# Patient Record
Sex: FEMALE | Race: BLACK OR AFRICAN AMERICAN | NOT HISPANIC OR LATINO | Employment: FULL TIME | URBAN - METROPOLITAN AREA
[De-identification: names, ages, dates, MRNs, and addresses within clinical notes are randomized per-mention and may not be internally consistent; named-entity substitution may affect disease eponyms.]

---

## 2017-01-05 DIAGNOSIS — M81.0 OSTEOPOROSIS, POSTMENOPAUSAL: ICD-10-CM

## 2017-01-06 RX ORDER — ALENDRONATE SODIUM 70 MG/1
TABLET ORAL
Qty: 4 TABLET | Refills: 0 | Status: SHIPPED | OUTPATIENT
Start: 2017-01-06 | End: 2017-01-16 | Stop reason: SINTOL

## 2017-01-09 ENCOUNTER — TELEPHONE (OUTPATIENT)
Dept: FAMILY MEDICINE | Facility: CLINIC | Age: 62
End: 2017-01-09

## 2017-01-09 NOTE — TELEPHONE ENCOUNTER
----- Message from Lizzy Kay sent at 12/30/2016 10:24 AM CST -----  Contact: Self   Patient would like to know if she need blood work before her appointment. Please call thank you

## 2017-01-10 ENCOUNTER — TELEPHONE (OUTPATIENT)
Dept: FAMILY MEDICINE | Facility: CLINIC | Age: 62
End: 2017-01-10

## 2017-01-10 DIAGNOSIS — E78.5 HYPERLIPIDEMIA, UNSPECIFIED HYPERLIPIDEMIA TYPE: ICD-10-CM

## 2017-01-10 DIAGNOSIS — I10 ESSENTIAL HYPERTENSION: Primary | ICD-10-CM

## 2017-01-10 NOTE — TELEPHONE ENCOUNTER
----- Message from Bailey Cunningham sent at 1/10/2017 12:34 PM CST -----  Contact: Self  Pt returned call. Pt also states she would like to have labs done @ [facility] [address]. Pt can be reached @ [phone].

## 2017-01-13 LAB
ALBUMIN SERPL-MCNC: 4.7 G/DL (ref 3.6–5.1)
ALBUMIN/GLOB SERPL: 1.7 (CALC) (ref 1–2.5)
ALP SERPL-CCNC: 43 U/L (ref 33–130)
ALT SERPL-CCNC: 24 U/L (ref 6–29)
AST SERPL-CCNC: 28 U/L (ref 10–35)
BASOPHILS # BLD AUTO: 20 CELLS/UL (ref 0–200)
BASOPHILS NFR BLD AUTO: 0.3 %
BILIRUB SERPL-MCNC: 0.5 MG/DL (ref 0.2–1.2)
BUN SERPL-MCNC: 14 MG/DL (ref 7–25)
BUN/CREAT SERPL: NORMAL (CALC) (ref 6–22)
CALCIUM SERPL-MCNC: 10.1 MG/DL (ref 8.6–10.4)
CHLORIDE SERPL-SCNC: 99 MMOL/L (ref 98–110)
CHOLEST SERPL-MCNC: 234 MG/DL (ref 125–200)
CHOLEST/HDLC SERPL: 2.1 (CALC)
CO2 SERPL-SCNC: 31 MMOL/L (ref 20–31)
CREAT SERPL-MCNC: 0.68 MG/DL (ref 0.5–0.99)
EOSINOPHIL # BLD AUTO: 33 CELLS/UL (ref 15–500)
EOSINOPHIL NFR BLD AUTO: 0.5 %
ERYTHROCYTE [DISTWIDTH] IN BLOOD BY AUTOMATED COUNT: 14 % (ref 11–15)
GFR SERPL CREATININE-BSD FRML MDRD: 93 ML/MIN/1.73M2
GLOBULIN SER CALC-MCNC: 2.7 G/DL (CALC) (ref 1.9–3.7)
GLUCOSE SERPL-MCNC: 90 MG/DL (ref 65–99)
HCT VFR BLD AUTO: 43.3 % (ref 35–45)
HDLC SERPL-MCNC: 114 MG/DL
HGB BLD-MCNC: 13.2 G/DL (ref 11.7–15.5)
LDLC SERPL CALC-MCNC: 109 MG/DL (CALC)
LYMPHOCYTES # BLD AUTO: 2613 CELLS/UL (ref 850–3900)
LYMPHOCYTES NFR BLD AUTO: 40.2 %
MCH RBC QN AUTO: 25.6 PG (ref 27–33)
MCHC RBC AUTO-ENTMCNC: 30.5 G/DL (ref 32–36)
MCV RBC AUTO: 84.1 FL (ref 80–100)
MONOCYTES # BLD AUTO: 585 CELLS/UL (ref 200–950)
MONOCYTES NFR BLD AUTO: 9 %
NEUTROPHILS # BLD AUTO: 3250 CELLS/UL (ref 1500–7800)
NEUTROPHILS NFR BLD AUTO: 50 %
NONHDLC SERPL-MCNC: 120 MG/DL (CALC)
PLATELET # BLD AUTO: 222 THOUSAND/UL (ref 140–400)
PMV BLD REES-ECKER: 9.8 FL (ref 7.5–11.5)
POTASSIUM SERPL-SCNC: 5.2 MMOL/L (ref 3.5–5.3)
PROT SERPL-MCNC: 7.4 G/DL (ref 6.1–8.1)
RBC # BLD AUTO: 5.15 MILLION/UL (ref 3.8–5.1)
SODIUM SERPL-SCNC: 139 MMOL/L (ref 135–146)
TRIGL SERPL-MCNC: 55 MG/DL
WBC # BLD AUTO: 6.5 THOUSAND/UL (ref 3.8–10.8)

## 2017-01-16 ENCOUNTER — OFFICE VISIT (OUTPATIENT)
Dept: FAMILY MEDICINE | Facility: CLINIC | Age: 62
End: 2017-01-16
Payer: COMMERCIAL

## 2017-01-16 ENCOUNTER — OFFICE VISIT (OUTPATIENT)
Dept: OBSTETRICS AND GYNECOLOGY | Facility: CLINIC | Age: 62
End: 2017-01-16
Attending: OBSTETRICS & GYNECOLOGY
Payer: COMMERCIAL

## 2017-01-16 VITALS
DIASTOLIC BLOOD PRESSURE: 76 MMHG | SYSTOLIC BLOOD PRESSURE: 120 MMHG | HEIGHT: 63 IN | BODY MASS INDEX: 17.03 KG/M2 | WEIGHT: 96.13 LBS

## 2017-01-16 VITALS
SYSTOLIC BLOOD PRESSURE: 138 MMHG | DIASTOLIC BLOOD PRESSURE: 84 MMHG | TEMPERATURE: 98 F | BODY MASS INDEX: 17.05 KG/M2 | HEART RATE: 72 BPM | HEIGHT: 63 IN | WEIGHT: 96.25 LBS

## 2017-01-16 DIAGNOSIS — Z01.419 WELL WOMAN EXAM WITH ROUTINE GYNECOLOGICAL EXAM: Primary | ICD-10-CM

## 2017-01-16 DIAGNOSIS — J06.9 VIRAL URI WITH COUGH: ICD-10-CM

## 2017-01-16 DIAGNOSIS — Z12.4 PAP SMEAR FOR CERVICAL CANCER SCREENING: ICD-10-CM

## 2017-01-16 DIAGNOSIS — J30.1 SEASONAL ALLERGIC RHINITIS DUE TO POLLEN: ICD-10-CM

## 2017-01-16 DIAGNOSIS — E55.9 VITAMIN D DEFICIENCY: ICD-10-CM

## 2017-01-16 DIAGNOSIS — M81.0 OSTEOPOROSIS, POSTMENOPAUSAL: ICD-10-CM

## 2017-01-16 DIAGNOSIS — Z12.31 VISIT FOR SCREENING MAMMOGRAM: ICD-10-CM

## 2017-01-16 DIAGNOSIS — I10 ESSENTIAL HYPERTENSION: Primary | ICD-10-CM

## 2017-01-16 DIAGNOSIS — M81.0 OSTEOPOROSIS: ICD-10-CM

## 2017-01-16 DIAGNOSIS — Z78.0 POSTMENOPAUSAL STATUS: ICD-10-CM

## 2017-01-16 PROCEDURE — 99999 PR PBB SHADOW E&M-EST. PATIENT-LVL III: CPT | Mod: PBBFAC,,, | Performed by: NURSE PRACTITIONER

## 2017-01-16 PROCEDURE — 99214 OFFICE O/P EST MOD 30 MIN: CPT | Mod: S$GLB,,, | Performed by: NURSE PRACTITIONER

## 2017-01-16 PROCEDURE — 3075F SYST BP GE 130 - 139MM HG: CPT | Mod: S$GLB,,, | Performed by: NURSE PRACTITIONER

## 2017-01-16 PROCEDURE — 99396 PREV VISIT EST AGE 40-64: CPT | Mod: S$GLB,,, | Performed by: OBSTETRICS & GYNECOLOGY

## 2017-01-16 PROCEDURE — 3079F DIAST BP 80-89 MM HG: CPT | Mod: S$GLB,,, | Performed by: NURSE PRACTITIONER

## 2017-01-16 PROCEDURE — 99999 PR PBB SHADOW E&M-EST. PATIENT-LVL III: CPT | Mod: PBBFAC,,, | Performed by: OBSTETRICS & GYNECOLOGY

## 2017-01-16 PROCEDURE — 1159F MED LIST DOCD IN RCRD: CPT | Mod: S$GLB,,, | Performed by: NURSE PRACTITIONER

## 2017-01-16 PROCEDURE — 3074F SYST BP LT 130 MM HG: CPT | Mod: S$GLB,,, | Performed by: OBSTETRICS & GYNECOLOGY

## 2017-01-16 PROCEDURE — 3078F DIAST BP <80 MM HG: CPT | Mod: S$GLB,,, | Performed by: OBSTETRICS & GYNECOLOGY

## 2017-01-16 RX ORDER — OLOPATADINE HYDROCHLORIDE 1 MG/ML
1 SOLUTION/ DROPS OPHTHALMIC 2 TIMES DAILY
COMMUNITY
End: 2017-04-10

## 2017-01-16 NOTE — PROGRESS NOTES
Cathy Reynolds is a 61 y.o. year old  female who presents for routine GYN exam.  She is postmenopausal, not on HRT.  No bleeding.  No hot flashes.  Denies changes in her medical / surgical history over the past year.  She is currently on Fosamax for osteoporosis but reports the development of GI upset / reflux- she plans to discuss with her PCP later today.  No GYN complaints.    BMD 3/3/16: Spine T-3.4,  Hip T-2.0, T-2.1      Past Medical History   Diagnosis Date    AR (allergic rhinitis)     Diverticulosis     Elevated LFTs      borderline - due to OTC herbals - resolved    History of colon polyps     Hyperlipidemia      borderline with high HDL    Hypertension     Mild anxiety     Osteoporosis, postmenopausal     Postmenopausal status     Underweight     Vitamin D deficiency        Past Surgical History   Procedure Laterality Date    Polyp removal from cervix       section, low transverse      Myomectomy      Colonoscopy         OB History      Para Term  AB TAB SAB Ectopic Multiple Living    2 2        2            ROS:  GENERAL: Feeling well overall.   SKIN: Denies rash or lesions.   HEAD: Denies head injury or headache.   NODES: Denies enlarged lymph nodes.   CHEST: Denies chest pain or shortness of breath.   CARDIOVASCULAR: Denies palpitations or left sided chest pain.   ABDOMEN: Reports GI upset / reflux.   URINARY: No dysuria or hematuria.  REPRODUCTIVE: See HPI.   BREASTS: Denies pain, lumps, or nipple discharge.   HEMATOLOGIC: No easy bruisability or excessive bleeding.   MUSCULOSKELETAL: Denies joint pain.  NEUROLOGIC: Denies syncope or weakness.   PSYCHIATRIC: Denies depression.     PE:   (chaperone present during entire exam)  APPEARANCE: Well nourished, well developed, in no acute distress.  BREASTS: Symmetrical, no skin changes or visible lesions. No palpable masses, nipple discharge or adenopathy bilaterally.  ABDOMEN: Soft. No tenderness or masses. No  hernias. No CVA tenderness.  VULVA: Atrophic. No lesions. Normal female genitalia.  URETHRAL MEATUS: Normal size and location, no lesions, no prolapse.  URETHRA: No masses, tenderness, prolapse or scarring.  VAGINA: Atrophic.  No lesions, no discharge, no significant cystocele or rectocele.  CERVIX: No lesions and discharge. PAP done.  UTERUS: Normal size, regular shape, mobile, non-tender, bladder base nontender.  ADNEXA: No masses, tenderness or CDS nodularity.  ANUS PERINEUM: Normal.    Diagnosis:  1. Well woman exam with routine gynecological exam    2. Postmenopausal status    3. Pap smear for cervical cancer screening    4. Visit for screening mammogram          PLAN:    Orders Placed This Encounter    Mammo Digital Screening Bilat with Tomos    Mammo Digital Screening Bilat With CAD    Liquid-based pap smear, screening       Patient was counseled today on postmenopausal issues.  We reviewed her osteoporosis and usage of Fosamax - she will discuss further with Dr. Carrizales later today.    Follow-up in 1 year.

## 2017-01-16 NOTE — PATIENT INSTRUCTIONS
Mucinex DM as directed  Call and schedule a appointment with Dr. Rockwell to discuss osteoporosis treatment

## 2017-01-16 NOTE — MR AVS SNAPSHOT
[facility]  [address]  [place] [address]  Phone: [phone]  Fax: [phone]                  Cathy Reynolds   2017 1:20 PM   Office Visit    Description:  Female : 1955   Provider:  MYA Rubin   Department:  [facility]           Reason for Visit     Hypertension           Diagnoses this Visit        Comments    Essential hypertension    -  Primary     Osteoporosis, postmenopausal         Vitamin D deficiency         Seasonal allergic rhinitis due to pollen         Viral URI with cough                To Do List           Future Appointments        Provider Department Dept Phone    2017 8:30 AM [facility] [phone]      Goals (5 Years of Data)     None      [facility]     [facility] Nurse Care [facility] -  Assistance  Registered nurses in the [facility] provide clinical advisement, health education, appointment booking, and other advisory services.  Call for this free service at [phone].             Medications           Message regarding Medications     Verify the changes and/or additions to your medication regime listed below are the same as discussed with your clinician today.  If any of these changes or additions are incorrect, please notify your healthcare provider.        STOP taking these medications     alendronate (FOSAMAX) 70 MG tablet TAKE 1 TABLET BY MOUTH EVERY 7 DAYS.           Verify that the below list of medications is an accurate representation of the medications you are currently taking.  If none reported, the list may be blank. If incorrect, please contact your healthcare provider. Carry this list with you in case of emergency.           Current Medications     olopatadine (PATANOL) 0.1 % ophthalmic solution 1 drop 2 (two) times daily.    fluticasone (FLONASE) 50 mcg/actuation nasal spray 2 sprays by Each Nare route once daily.    loratadine  "(CLARITIN) 10 mg tablet Take 1 tablet (10 mg total) by mouth once daily.    losartan (COZAAR) 100 MG tablet TAKE 1 TABLET BY MOUTH ONCE DAILY.    methocarbamol (ROBAXIN) 500 MG Tab Take 1 tablet (500 mg total) by mouth 4 (four) times daily. As needed for muscle spasms    SIMETHICONE (GAS-X ORAL) Take 1 capsule by mouth once as needed.           Clinical Reference Information           Vital Signs - Last Recorded  Most recent update: 1/16/2017  1:38 PM by Pippa Franklin MA    Pulse Temp Ht Wt BMI    72 98 °F (36.7 °C) (Oral) 5' 3" (1.6 m) 43.6 kg (96 lb 3.7 oz) 17.05 kg/m2      Allergies as of 1/16/2017     Ace Inhibitors    Penicillins      Immunizations Administered on Date of Encounter - 1/16/2017     None      Instructions    Mucinex DM as directed  Call and schedule a appointment with Dr. Rockwell to discuss osteoporosis treatment        "

## 2017-01-16 NOTE — PROGRESS NOTES
Subjective:       Patient ID: Cathy Reynolds is a 61 y.o. female.    Chief Complaint: Hypertension    HPI Comments: 61-year-old female presents to the clinic today for hypertension checkup.  She is also here to discuss her lab results.  I did discuss her lab results with her.  She states she saw GYN Dr. Dos Santos this morning they told her to follow-up with endocrinologist Dr. Rockwell to discuss osteoporosis treatment.  She says she thinks the Fosamax that she's taking is causing GI upset.  She also has some mild sinus congestion with a mild cough.  She denies any fever, chills, sore throat, ear pain, wheezing, shortness of breath, abdominal pain, nausea, vomiting, or diarrhea.  She denies any cardiac chest pain, heart palpitations, shortness breath, or swelling to lower extremities.  She denies any headaches, dizziness, or blurred vision.  She says she will call herself to schedule appointment with Dr. Rockwell. She says her home blood pressure run 107-120/74-80's.     Past Medical History   Diagnosis Date    AR (allergic rhinitis)     Diverticulosis     Elevated LFTs      borderline - due to OTC herbals - resolved    History of colon polyps     Hyperlipidemia      borderline with high HDL    Hypertension     Mild anxiety     Osteoporosis, postmenopausal     Postmenopausal status     Underweight     Vitamin D deficiency      Past Surgical History   Procedure Laterality Date    Polyp removal from cervix       section, low transverse      Myomectomy      Colonoscopy        reports that she has never smoked. She does not have any smokeless tobacco history on file. She reports that she does not drink alcohol or use illicit drugs.  Review of Systems   Constitutional: Negative for chills and fever.   HENT: Positive for congestion. Negative for ear discharge, ear pain, postnasal drip, rhinorrhea, sinus pressure, sneezing and sore throat.    Eyes: Negative for pain, discharge and itching.    Respiratory: Positive for cough. Negative for shortness of breath and wheezing.    Cardiovascular: Negative for chest pain, palpitations and leg swelling.   Gastrointestinal: Negative for abdominal pain, diarrhea, nausea and vomiting.   Musculoskeletal: Negative for gait problem.   Neurological: Negative for dizziness, light-headedness and headaches.       Objective:      Physical Exam   Constitutional: She is oriented to person, place, and time. She appears well-developed and well-nourished. No distress.   HENT:   Head: Normocephalic and atraumatic.   Right Ear: External ear normal.   Left Ear: External ear normal.   Nose: Nose normal.   Mouth/Throat: Oropharynx is clear and moist. No oropharyngeal exudate.   Eyes: Conjunctivae and EOM are normal. Pupils are equal, round, and reactive to light. Right eye exhibits no discharge. Left eye exhibits no discharge. No scleral icterus.   Neck: Normal range of motion. Neck supple. No JVD present.   Cardiovascular: Normal rate, regular rhythm and normal heart sounds.  Exam reveals no gallop and no friction rub.    No murmur heard.  Pulmonary/Chest: Effort normal and breath sounds normal. No respiratory distress. She has no wheezes. She has no rales.   Abdominal: Soft. Bowel sounds are normal. There is no tenderness.   Musculoskeletal: Normal range of motion. She exhibits no edema.   Lymphadenopathy:     She has no cervical adenopathy.   Neurological: She is alert and oriented to person, place, and time.   Skin: Skin is warm and dry. She is not diaphoretic.   Psychiatric: She has a normal mood and affect.       Assessment:       1. Essential hypertension    2. Osteoporosis, postmenopausal    3. Vitamin D deficiency    4. Seasonal allergic rhinitis due to pollen    5. Viral URI with cough        Plan:         Essential hypertension  - The current medical regimen is effective;  continue present plan and medications.    Osteoporosis, postmenopausal  - wants to stop Fosamax and  will call and schedule a apt with Dr. Rockwell to discuss shots for osteoporosis treatment     Vitamin D deficiency  - continue vitamin d    Seasonal allergic rhinitis due to pollen  - The current medical regimen is effective;  continue present plan and medications.    Viral URI with cough  - Mucinex DM as directed

## 2017-02-04 DIAGNOSIS — I10 ESSENTIAL HYPERTENSION: ICD-10-CM

## 2017-02-05 RX ORDER — LOSARTAN POTASSIUM 100 MG/1
TABLET ORAL
Qty: 30 TABLET | Refills: 1 | Status: SHIPPED | OUTPATIENT
Start: 2017-02-05 | End: 2017-04-10 | Stop reason: SINTOL

## 2017-02-06 RX ORDER — FLUTICASONE PROPIONATE 50 MCG
SPRAY, SUSPENSION (ML) NASAL
Qty: 16 G | Refills: 0 | Status: SHIPPED | OUTPATIENT
Start: 2017-02-06 | End: 2017-09-25 | Stop reason: SDUPTHER

## 2017-02-27 ENCOUNTER — HOSPITAL ENCOUNTER (OUTPATIENT)
Dept: RADIOLOGY | Facility: HOSPITAL | Age: 62
Discharge: HOME | End: 2017-02-27
Attending: OBSTETRICS & GYNECOLOGY
Payer: COMMERCIAL

## 2017-02-27 DIAGNOSIS — Z12.31 VISIT FOR SCREENING MAMMOGRAM: ICD-10-CM

## 2017-02-27 PROCEDURE — 77067 SCR MAMMO BI INCL CAD: CPT | Mod: TC

## 2017-02-27 PROCEDURE — 77067 SCR MAMMO BI INCL CAD: CPT | Mod: 26,,, | Performed by: RADIOLOGY

## 2017-03-14 ENCOUNTER — OFFICE VISIT (OUTPATIENT)
Dept: ENDOCRINOLOGY | Facility: CLINIC | Age: 62
End: 2017-03-14
Payer: COMMERCIAL

## 2017-03-14 ENCOUNTER — TELEPHONE (OUTPATIENT)
Dept: ENDOCRINOLOGY | Facility: CLINIC | Age: 62
End: 2017-03-14

## 2017-03-14 VITALS
SYSTOLIC BLOOD PRESSURE: 148 MMHG | HEART RATE: 83 BPM | BODY MASS INDEX: 17.62 KG/M2 | HEIGHT: 63 IN | WEIGHT: 99.44 LBS | DIASTOLIC BLOOD PRESSURE: 96 MMHG

## 2017-03-14 DIAGNOSIS — M81.0 OSTEOPOROSIS, POSTMENOPAUSAL: Primary | ICD-10-CM

## 2017-03-14 DIAGNOSIS — I10 ESSENTIAL HYPERTENSION: ICD-10-CM

## 2017-03-14 DIAGNOSIS — F41.9 MILD ANXIETY: ICD-10-CM

## 2017-03-14 DIAGNOSIS — R68.89 ABNORMAL ENDOCRINE LABORATORY TEST FINDING: Primary | ICD-10-CM

## 2017-03-14 DIAGNOSIS — R63.6 UNDERWEIGHT: ICD-10-CM

## 2017-03-14 PROCEDURE — 99244 OFF/OP CNSLTJ NEW/EST MOD 40: CPT | Mod: S$GLB,,, | Performed by: INTERNAL MEDICINE

## 2017-03-14 PROCEDURE — 99999 PR PBB SHADOW E&M-EST. PATIENT-LVL III: CPT | Mod: PBBFAC,,, | Performed by: INTERNAL MEDICINE

## 2017-03-14 NOTE — MR AVS SNAPSHOT
[facility]  [address]  [facility] [address]  Phone: [phone]  Fax: [phone]                  Cathy Reynolds   3/14/2017 10:00 AM   Office Visit    Description:  Female : 1955   Provider:  Honorio Rockwell MD   [facility]  [facility] - [facility]Metab           Reason for Visit     Osteoporosis           Diagnoses this Visit        Comments    Osteoporosis, postmenopausal    -  Primary     Essential hypertension         Underweight         Mild anxiety                To Do List           Future Appointments        Provider Department Dept Phone    3/14/2017 12:05 PM LAB, APPOINTMENT [place] [facility] [phone]      Goals (5 Years of Data)     None      Follow-Up and Disposition     Follow-up and Disposition History      [facility]     [facility] Nurse Care [facility] -  Assistance  Registered nurses in the [facility] provide clinical advisement, health education, appointment booking, and other advisory services.  Call for this free service at [phone].             Medications           Message regarding Medications     Verify the changes and/or additions to your medication regime listed below are the same as discussed with your clinician today.  If any of these changes or additions are incorrect, please notify your healthcare provider.        STOP taking these medications     methocarbamol (ROBAXIN) 500 MG Tab Take 1 tablet (500 mg total) by mouth 4 (four) times daily. As needed for muscle spasms           Verify that the below list of medications is an accurate representation of the medications you are currently taking.  If none reported, the list may be blank. If incorrect, please contact your healthcare provider. Carry this list with you in case of emergency.           Current Medications     loratadine (CLARITIN) 10 mg tablet Take 1 tablet (10 mg total) by mouth once daily.    losartan (COZAAR) 100 MG tablet  "TAKE 1 TABLET BY MOUTH ONCE DAILY.    olopatadine (PATANOL) 0.1 % ophthalmic solution 1 drop 2 (two) times daily.    SIMETHICONE (GAS-X ORAL) Take 1 capsule by mouth once as needed.    fluticasone (FLONASE) 50 mcg/actuation nasal spray SPRAY TWICE IN EACH NOSTRIL ONCE DAILY           Clinical Reference Information           Your Vitals Were     BP Pulse Height Weight BMI    148/96 83 5' 3" (1.6 m) 45.1 kg (99 lb 6.8 oz) 17.61 kg/m2      Blood Pressure          Most Recent Value    BP  (!)  148/96      Allergies as of 3/14/2017     Ace Inhibitors    Penicillins      Immunizations Administered on Date of Encounter - 3/14/2017     None      Orders Placed During Today's Visit     Future Labs/Procedures Expected by Expires    Protein electrophoresis, serum  3/14/2017 3/14/2018    PTH, intact  3/14/2017 3/14/2018    Renal function panel  3/14/2017 3/14/2018    TSH  3/14/2017 3/14/2018    Vitamin D  3/14/2017 3/14/2018      Instructions    Osteoporosis probably related to underweight and small bone structure and menopause    Lumbar spine BMD t-score -3.4, but increased 8% on alendronate  But alendronate caused heartburn    HTN and HLP controlled  BP today but normal at home  Repeat BP was 160/80    GERD    Rec: Consider Reclast, Prolia, raloxifene  Patient plans on not taking above and taking Algacal plus strontium  I explained the latter not approved in [place] but used in [place]  BMD repeat with PCP in 2018    My preference is Prolia       Language Assistance Services     ATTENTION: Language assistance services are available, free of charge. Please call [phone].      ATENCIÓN: Si habla español, tiene a uribe disposición servicios gratuitos de asistencia lingüística. Llame al [phone].     AMY Ý: N?u b?n nói Ti?ng Vi?t, có các d?ch v? h? tr? ngôn ng? mi?n phí dành cho b?n. G?i s? [phone].         [facility] complies with applicable Federal civil rights laws and does not discriminate on the " basis of race, color, national origin, age, disability, or sex.

## 2017-03-14 NOTE — LETTER
March 14, 2017      Masha Elizondo MD  [address]  [place] [address]           [facility] - Endo/Diab/Metab  [address]  [facility] [address]  Phone: [phone]  Fax: [phone]          Patient: Cathy Reynolds   MR Number: [id number]   YOB: 1955   Date of Visit: 3/14/2017       Dear Dr. Masha Elizondo:    Thank you for referring Cathy Reynolds to me for evaluation. Attached you will find relevant portions of my assessment and plan of care.    If you have questions, please do not hesitate to call me. I look forward to following Cathy Reynolds along with you.    Sincerely,    Honorio Rockwell MD    Enclosure  CC:  No Recipients    If you would like to receive this communication electronically, please contact [email or web address] or [phone] to request more information on [employer] access.    For providers and/or their staff who would like to refer a patient to [facility], please contact us through our one-stop-shop provider referral line, [facility], at [phone].    If you feel you have received this communication in error or would no longer like to receive these types of communications, please e-mail [email or web address]

## 2017-03-14 NOTE — PROGRESS NOTES
Subjective:      Patient ID: Cathy Reynolds is a 61 y.o. female.    Chief Complaint:  Osteoporosis      History of Present Illness  Consult is for osteoporosis  No hx of fractures  Menopause 49 y/o  2 children  No breast feeding  High school weight small    Underweight, Life long  Not losing weight    Never took hormones  Activity with children    HTN, HLP     Flonase PRN    Review of Systems   Constitutional: Negative for fatigue and unexpected weight change.   HENT: Negative for hearing loss.    Eyes: Negative for visual disturbance.   Respiratory: Negative for apnea, cough, chest tightness and shortness of breath.    Cardiovascular: Negative for chest pain, palpitations and leg swelling.   Gastrointestinal: Negative for abdominal pain, constipation, diarrhea, nausea and vomiting.   Genitourinary: Negative for dysuria, frequency, menstrual problem and vaginal discharge.   Musculoskeletal: Positive for back pain. Negative for arthralgias and gait problem.   Skin: Negative for rash.   Neurological: Positive for headaches. Negative for dizziness, tremors, weakness and numbness.   Psychiatric/Behavioral: Negative for sleep disturbance. The patient is nervous/anxious.        Objective:   Physical Exam   Constitutional: She is oriented to person, place, and time. She appears well-developed and well-nourished.   HENT:   Head: Normocephalic.   Eyes: EOM are normal. Pupils are equal, round, and reactive to light. No scleral icterus.   Neck: No thyromegaly present.   Cardiovascular: Normal rate, regular rhythm, normal heart sounds and intact distal pulses.  Exam reveals no gallop.    No murmur heard.  Pulmonary/Chest: Effort normal and breath sounds normal. No respiratory distress. She has no wheezes. She has no rales. She exhibits no tenderness.   Abdominal: Soft. Bowel sounds are normal. She exhibits no mass. There is no tenderness. There is no rebound.   Musculoskeletal: Normal range of motion. She exhibits no edema  or tenderness.   Scoliosis mild  Tandem gait normal  Very small bone structure   Lymphadenopathy:     She has no cervical adenopathy.   Neurological: She is alert and oriented to person, place, and time. She has normal reflexes. No cranial nerve deficit. Coordination normal.   Skin: Skin is warm and dry. No rash noted.   Psychiatric: She has a normal mood and affect. Her behavior is normal. Thought content normal.   Vitals reviewed.      Lab Review:   Results for orders placed or performed in visit on 01/10/17   CBC auto differential   Result Value Ref Range    WBC 6.5 3.8 - 10.8 Thousand/uL    RBC 5.15 (H) 3.80 - 5.10 Million/uL    Hemoglobin 13.2 11.7 - 15.5 g/dL    Hematocrit 43.3 35.0 - 45.0 %    MCV 84.1 80.0 - 100.0 fL    MCH 25.6 (L) 27.0 - 33.0 pg    MCHC 30.5 (L) 32.0 - 36.0 g/dL    RDW 14.0 11.0 - 15.0 %    Platelets 222 140 - 400 Thousand/uL    MPV 9.8 7.5 - 11.5 fL    Neutrophils Absolute 3250 1500 - 7800 cells/uL    Lymph # 2613 850 - 3900 cells/uL    Mono # 585 200 - 950 cells/uL    Eos # 33 15 - 500 cells/uL    Baso # 20 0 - 200 cells/uL    Neutrophils Relative 50.0 %    Lymph% 40.2 %    Mono% 9.0 %    Eosinophil% 0.5 %    Basophil% 0.3 %   Comprehensive metabolic panel   Result Value Ref Range    Glucose 90 65 - 99 mg/dL    BUN, Bld 14 7 - 25 mg/dL    Creatinine 0.68 0.50 - 0.99 mg/dL    eGFR if non  93 > OR = 60 mL/min/1.73m2    eGFR if African American 108 > OR = 60 mL/min/1.73m2    BUN/Creatinine Ratio NOT APPLICABLE 6 - 22 (calc)    Sodium 139 135 - 146 mmol/L    Potassium 5.2 3.5 - 5.3 mmol/L    Chloride 99 98 - 110 mmol/L    CO2 31 20 - 31 mmol/L    Calcium 10.1 8.6 - 10.4 mg/dL    Total Protein 7.4 6.1 - 8.1 g/dL    Albumin 4.7 3.6 - 5.1 g/dL    Globulin, Total 2.7 1.9 - 3.7 g/dL (calc)    Albumin/Globulin Ratio 1.7 1.0 - 2.5 (calc)    Total Bilirubin 0.5 0.2 - 1.2 mg/dL    Alkaline Phosphatase 43 33 - 130 U/L    AST 28 10 - 35 U/L    ALT 24 6 - 29 U/L   Lipid panel   Result  Value Ref Range    Cholesterol 234 (H) 125 - 200 mg/dL     > OR = 46 mg/dL    Triglycerides 55 <150 mg/dL    LDL Cholesterol 109 <130 mg/dL (calc)    HDL/Chol Ratio 2.1 < OR = 5.0 (calc)    Non HDL Chol. (LDL+VLDL) 120 mg/dL (calc)         Assessment:     Osteoporosis probably related to underweight and small bone structure and menopause    Lumbar spine BMD t-score -3.4, but increased 8% on alendronate  But alendronate caused heartburn    HTN and HLP controlled  BP today but normal at home  Repeat BP was 160/80    GERD    Rec: Consider Reclast, Prolia, raloxifene  Patient plans on not taking above and taking Algacal plus strontium  I explained the latter not approved in [place] but used in [place]  BMD repeat with PCP in 2018    My preference is Prolia      Plan:     Orders Placed This Encounter   Procedures    Vitamin D     Standing Status:   Future     Standing Expiration Date:   3/14/2018    PTH, intact     Standing Status:   Future     Standing Expiration Date:   3/14/2018    Renal function panel     Standing Status:   Future     Standing Expiration Date:   3/14/2018    Protein electrophoresis, serum     Standing Status:   Future     Standing Expiration Date:   3/14/2018

## 2017-03-14 NOTE — PATIENT INSTRUCTIONS
Osteoporosis probably related to underweight and small bone structure and menopause    Lumbar spine BMD t-score -3.4, but increased 8% on alendronate  But alendronate caused heartburn    HTN and HLP controlled  BP today but normal at home  Repeat BP was 160/80    GERD    Rec: Consider Reclast, Prolia, raloxifene  Patient plans on not taking above and taking Algacal plus strontium  I explained the latter not approved in [place] but used in [place]  BMD repeat with PCP in 2018    My preference is Prolia

## 2017-03-14 NOTE — TELEPHONE ENCOUNTER
The following lab tests are abnormal:  The TSH is a little low and can be seen with hyperthyroidism, but TSH normal  The potassium is mildly elevated and could be related to losartan  Contact your PCP about potassium and see about substituting losartan with another medication.  I will repeat TSH and renal function test in one week fasting.

## 2017-03-16 ENCOUNTER — TELEPHONE (OUTPATIENT)
Dept: ENDOCRINOLOGY | Facility: CLINIC | Age: 62
End: 2017-03-16

## 2017-03-16 NOTE — TELEPHONE ENCOUNTER
Left patient a message regarding to schedule her labs in one week.    - TSH  - Renal Function      Spoke to the patient on yesterday to schedule her labs, she wanted me to call today to schedule them.

## 2017-03-22 ENCOUNTER — TELEPHONE (OUTPATIENT)
Dept: ENDOCRINOLOGY | Facility: CLINIC | Age: 62
End: 2017-03-22

## 2017-03-22 ENCOUNTER — TELEPHONE (OUTPATIENT)
Dept: DIABETES | Facility: CLINIC | Age: 62
End: 2017-03-22

## 2017-03-22 DIAGNOSIS — R79.89 ABNORMAL THYROID BLOOD TEST: ICD-10-CM

## 2017-03-22 DIAGNOSIS — M81.0 SENILE OSTEOPOROSIS: Primary | ICD-10-CM

## 2017-03-22 DIAGNOSIS — R79.89 ABNORMAL THYROID BLOOD TEST: Primary | ICD-10-CM

## 2017-03-22 NOTE — TELEPHONE ENCOUNTER
----- Message from Alfa Ignacio sent at 3/22/2017 12:51 PM CDT -----  Contact: Patient  Patient stated that she will like to complete her labs at [facility] 7am if possible.    Please call patient at [phone].  Detailed message is okay

## 2017-03-28 ENCOUNTER — TELEPHONE (OUTPATIENT)
Dept: ENDOCRINOLOGY | Facility: CLINIC | Age: 62
End: 2017-03-28

## 2017-03-28 DIAGNOSIS — R68.89 ABNORMAL ENDOCRINE LABORATORY TEST FINDING: Primary | ICD-10-CM

## 2017-03-28 DIAGNOSIS — M81.0 SENILE OSTEOPOROSIS: ICD-10-CM

## 2017-03-28 NOTE — TELEPHONE ENCOUNTER
Left message that patient labs were put in for [facility] and if she has any other concerns or issues to give the office a call back

## 2017-03-28 NOTE — TELEPHONE ENCOUNTER
----- Message from Renea Soni sent at 3/24/2017  4:16 PM CDT -----  Contact: pt  Pt needs orders faxed to [facility]  [facility] [address]         Please call pt when faxed to [facility]     [facility] at 7:00 in morning at [facility]      [facility]   -  Ph   [phone]   If no answer leave message     Thanks

## 2017-03-28 NOTE — TELEPHONE ENCOUNTER
----- Message from Galilea Loaiza sent at 3/28/2017 12:38 PM CDT -----  Contact: pt    [phone]  nura Wolfe   Pt  Calling  To   To speak with the nurse in regards to getting labs set up  At [facility],

## 2017-04-03 LAB
1,25(OH)2D SERPL-MCNC: 52 PG/ML (ref 18–72)
1,25(OH)2D2 SERPL-MCNC: <8 PG/ML
1,25(OH)2D3 SERPL-MCNC: 52 PG/ML
ALBUMIN SERPL ELPH-MCNC: 4.6 G/DL (ref 3.8–4.8)
ALBUMIN SERPL-MCNC: 4.6 G/DL (ref 3.6–5.1)
ALPHA1 GLOB SERPL ELPH-MCNC: 0.3 G/DL (ref 0.2–0.3)
ALPHA2 GLOB SERPL ELPH-MCNC: 0.7 G/DL (ref 0.5–0.9)
BETA1 GLOB SERPL ELPH-MCNC: 0.5 G/DL (ref 0.4–0.6)
BETA2 GLOB SERPL ELPH-MCNC: 0.4 G/DL (ref 0.2–0.5)
BUN SERPL-MCNC: 16 MG/DL (ref 7–25)
BUN/CREAT SERPL: NORMAL (CALC) (ref 6–22)
CALCIUM SERPL-MCNC: 9.8 MG/DL (ref 8.6–10.4)
CHLORIDE SERPL-SCNC: 100 MMOL/L (ref 98–110)
CO2 SERPL-SCNC: 30 MMOL/L (ref 20–31)
CREAT SERPL-MCNC: 0.77 MG/DL (ref 0.5–0.99)
GAMMA GLOB SERPL ELPH-MCNC: 1.3 G/DL (ref 0.8–1.7)
GFR SERPL CREATININE-BSD FRML MDRD: 82 ML/MIN/1.73M2
GLUCOSE SERPL-MCNC: 98 MG/DL (ref 65–99)
PHOSPHATE SERPL-MCNC: 3.8 MG/DL (ref 2.5–4.5)
POTASSIUM SERPL-SCNC: 5.1 MMOL/L (ref 3.5–5.3)
PROT PATTERN SERPL ELPH-IMP: NORMAL
PROT SERPL-MCNC: 7.7 G/DL (ref 6.1–8.1)
PTH-INTACT SERPL-MCNC: 24 PG/ML (ref 14–64)
SODIUM SERPL-SCNC: 136 MMOL/L (ref 135–146)
TSH SERPL-ACNC: 1.29 MIU/L (ref 0.4–4.5)

## 2017-04-10 ENCOUNTER — TELEPHONE (OUTPATIENT)
Dept: ENDOCRINOLOGY | Facility: CLINIC | Age: 62
End: 2017-04-10

## 2017-04-10 ENCOUNTER — OFFICE VISIT (OUTPATIENT)
Dept: FAMILY MEDICINE | Facility: CLINIC | Age: 62
End: 2017-04-10
Payer: COMMERCIAL

## 2017-04-10 VITALS
OXYGEN SATURATION: 99 % | TEMPERATURE: 98 F | HEIGHT: 63 IN | HEART RATE: 92 BPM | SYSTOLIC BLOOD PRESSURE: 140 MMHG | BODY MASS INDEX: 17.32 KG/M2 | DIASTOLIC BLOOD PRESSURE: 90 MMHG | WEIGHT: 97.75 LBS

## 2017-04-10 DIAGNOSIS — M81.0 OSTEOPOROSIS, POSTMENOPAUSAL: ICD-10-CM

## 2017-04-10 DIAGNOSIS — I10 ESSENTIAL HYPERTENSION: Primary | ICD-10-CM

## 2017-04-10 DIAGNOSIS — J30.1 SEASONAL ALLERGIC RHINITIS DUE TO POLLEN: ICD-10-CM

## 2017-04-10 DIAGNOSIS — E55.9 VITAMIN D DEFICIENCY: ICD-10-CM

## 2017-04-10 PROCEDURE — 1160F RVW MEDS BY RX/DR IN RCRD: CPT | Mod: S$GLB,,, | Performed by: NURSE PRACTITIONER

## 2017-04-10 PROCEDURE — 3077F SYST BP >= 140 MM HG: CPT | Mod: S$GLB,,, | Performed by: NURSE PRACTITIONER

## 2017-04-10 PROCEDURE — 99214 OFFICE O/P EST MOD 30 MIN: CPT | Mod: S$GLB,,, | Performed by: NURSE PRACTITIONER

## 2017-04-10 PROCEDURE — 3080F DIAST BP >= 90 MM HG: CPT | Mod: S$GLB,,, | Performed by: NURSE PRACTITIONER

## 2017-04-10 PROCEDURE — 99999 PR PBB SHADOW E&M-EST. PATIENT-LVL IV: CPT | Mod: PBBFAC,,, | Performed by: NURSE PRACTITIONER

## 2017-04-10 RX ORDER — AMLODIPINE BESYLATE 5 MG/1
5 TABLET ORAL DAILY
Qty: 30 TABLET | Refills: 2 | Status: SHIPPED | OUTPATIENT
Start: 2017-04-10 | End: 2017-08-03 | Stop reason: SDUPTHER

## 2017-04-10 NOTE — PROGRESS NOTES
Subjective:       Patient ID: Cathy Reynolds is a 61 y.o. female.    Chief Complaint: Hypertension (Discuss Losartan)    HPI Comments: 61-year-old female presents to the clinic today for hypertension checkup.  She states her blood pressures at home having been running 105-130/75-85 .  She brought a letter from Dr. Rockwell stating that her potassium is elevated so she needed her blood pressure medication changed.  I am unable to view his labs at this time.  She denies any headaches, dizziness, or blurred vision.  She denies any cardiac chest pain, heart palpitations, shortness breath, or swelling to lower extremities. Dr. Rockwell recommended injections for her osteoporosis that she refuses them at this time.  She says she's thinking about what she wants today.     Past Medical History:   Diagnosis Date    AR (allergic rhinitis)     Diverticulosis     Elevated LFTs     borderline - due to OTC herbals - resolved    History of colon polyps     Hyperlipidemia     borderline with high HDL    Hypertension     Mild anxiety     Osteoporosis, postmenopausal     Postmenopausal status     Underweight     Vitamin D deficiency      Past Surgical History:   Procedure Laterality Date     SECTION, LOW TRANSVERSE      COLONOSCOPY      MYOMECTOMY      polyp removal from cervix        reports that she has never smoked. She does not have any smokeless tobacco history on file. She reports that she does not drink alcohol or use illicit drugs.  Review of Systems    Objective:      Physical Exam    Assessment:       1. Essential hypertension    2. Seasonal allergic rhinitis due to pollen    3. Osteoporosis, postmenopausal    4. Vitamin D deficiency        Plan:         Essential hypertension  - stop Losartan   - Start Amlodipine    Seasonal allergic rhinitis due to pollen  - The current medical regimen is effective;  continue present plan and medications.    Osteoporosis, postmenopausal  - followed by   Luli  - she is thinking about what she wants to take for her osteoporosis     Vitamin D deficiency  - continue Vitamin D 2,000 and Caltrate Bid      Other orders  -     amlodipine (NORVASC) 5 MG tablet; Take 1 tablet (5 mg total) by mouth once daily.  Dispense: 30 tablet; Refill: 2

## 2017-04-10 NOTE — MR AVS SNAPSHOT
[facility]  [address] [place]  [place] [address]  Phone: [phone]  Fax: [phone]                  Cathy Reynolds   4/10/2017 9:40 AM   Office Visit    Description:  Female : 1955   Provider:  MYA Rubin   Department:  [facility]           Reason for Visit     Hypertension           Diagnoses this Visit        Comments    Essential hypertension    -  Primary     Seasonal allergic rhinitis due to pollen         Osteoporosis, postmenopausal         Vitamin D deficiency                To Do List           Goals (5 Years of Data)     None       These Medications        Disp Refills Start End    amlodipine (NORVASC) 5 MG tablet 30 tablet 2 4/10/2017 4/10/2018    Take 1 tablet (5 mg total) by mouth once daily. - Oral    Pharmacy: [facility] - [address] AT [facility] #: [phone]         [facility] On Call     [facility] On Call Nurse Care Line -  Assistance  Unless otherwise directed by your provider, please contact [facility], our nurse care line that is available for  assistance.     Registered nurses in the [facility] On Call Center provide: appointment scheduling, clinical advisement, health education, and other advisory services.  Call: [phone] (toll free)               Medications           Message regarding Medications     Verify the changes and/or additions to your medication regime listed below are the same as discussed with your clinician today.  If any of these changes or additions are incorrect, please notify your healthcare provider.        START taking these NEW medications        Refills    amlodipine (NORVASC) 5 MG tablet 2    Sig: Take 1 tablet (5 mg total) by mouth once daily.    Class: Normal    Route: Oral      STOP taking these medications     olopatadine (PATANOL) 0.1 % ophthalmic solution 1 drop 2 (two) times daily.    losartan (COZAAR) 100  "MG tablet TAKE 1 TABLET BY MOUTH ONCE DAILY.           Verify that the below list of medications is an accurate representation of the medications you are currently taking.  If none reported, the list may be blank. If incorrect, please contact your healthcare provider. Carry this list with you in case of emergency.           Current Medications     fluticasone (FLONASE) 50 mcg/actuation nasal spray SPRAY TWICE IN EACH NOSTRIL ONCE DAILY    loratadine (CLARITIN) 10 mg tablet Take 1 tablet (10 mg total) by mouth once daily.    SIMETHICONE (GAS-X ORAL) Take 1 capsule by mouth once as needed.    amlodipine (NORVASC) 5 MG tablet Take 1 tablet (5 mg total) by mouth once daily.           Clinical Reference Information           Your Vitals Were     BP Pulse Temp Height Weight SpO2    140/90 92 98.2 °F (36.8 °C) (Oral) 5' 3" (1.6 m) 44.3 kg (97 lb 12.4 oz) 99%    BMI                17.32 kg/m2          Blood Pressure          Most Recent Value    BP  (!)  140/90      Allergies as of 4/10/2017     Ace Inhibitors    Penicillins      Immunizations Administered on Date of Encounter - 4/10/2017     None      Language Assistance Services     ATTENTION: Language assistance services are available, free of charge. Please call [phone].      ATENCIÓN: Si habla noeañol, tiene a uribe disposición servicios gratuitos de asistencia lingüística. Llame al [phone].     AMY Ý: N?u b?n nói Ti?ng Vi?t, có các d?ch v? h? tr? ngôn ng? mi?n phí dành cho b?n. G?i s? [phone].         [facility] complies with applicable Federal civil rights laws and does not discriminate on the basis of race, color, national origin, age, disability, or sex.        "

## 2017-04-10 NOTE — TELEPHONE ENCOUNTER
----- Message from Carolina Everett sent at 4/10/2017 10:24 AM CDT -----  Contact: Pt  Pt would like for the nurse to call her regarding labs that were done at [facility]. She is a former patient of Dr Rockwell. Pt stated that her Dr Carrizales has changed her BP meds to amlodipine (NORVASC) 5 MG tablet due to losartan (COZAAR) 100 MG tablet possibly raising Potassium levels. Pt can be reached at [phone]

## 2017-05-15 ENCOUNTER — OFFICE VISIT (OUTPATIENT)
Dept: FAMILY MEDICINE | Facility: CLINIC | Age: 62
End: 2017-05-15
Payer: COMMERCIAL

## 2017-05-15 VITALS
TEMPERATURE: 98 F | SYSTOLIC BLOOD PRESSURE: 132 MMHG | HEIGHT: 63 IN | OXYGEN SATURATION: 97 % | WEIGHT: 97.13 LBS | DIASTOLIC BLOOD PRESSURE: 80 MMHG | BODY MASS INDEX: 17.21 KG/M2 | HEART RATE: 85 BPM

## 2017-05-15 DIAGNOSIS — M81.0 OSTEOPOROSIS, POSTMENOPAUSAL: ICD-10-CM

## 2017-05-15 DIAGNOSIS — J30.1 SEASONAL ALLERGIC RHINITIS DUE TO POLLEN: ICD-10-CM

## 2017-05-15 DIAGNOSIS — E55.9 VITAMIN D DEFICIENCY: ICD-10-CM

## 2017-05-15 DIAGNOSIS — I10 ESSENTIAL HYPERTENSION: Primary | ICD-10-CM

## 2017-05-15 PROCEDURE — 99214 OFFICE O/P EST MOD 30 MIN: CPT | Mod: S$GLB,,, | Performed by: NURSE PRACTITIONER

## 2017-05-15 PROCEDURE — 1160F RVW MEDS BY RX/DR IN RCRD: CPT | Mod: S$GLB,,, | Performed by: NURSE PRACTITIONER

## 2017-05-15 PROCEDURE — 99999 PR PBB SHADOW E&M-EST. PATIENT-LVL IV: CPT | Mod: PBBFAC,,, | Performed by: NURSE PRACTITIONER

## 2017-05-15 PROCEDURE — 3075F SYST BP GE 130 - 139MM HG: CPT | Mod: S$GLB,,, | Performed by: NURSE PRACTITIONER

## 2017-05-15 PROCEDURE — 3079F DIAST BP 80-89 MM HG: CPT | Mod: S$GLB,,, | Performed by: NURSE PRACTITIONER

## 2017-05-15 RX ORDER — ACETAMINOPHEN 500 MG
2 TABLET ORAL DAILY
COMMUNITY
Start: 2017-05-15

## 2017-05-15 NOTE — MR AVS SNAPSHOT
[facility]  [address]  [place] [address]  Phone: [phone]  Fax: [phone]                  Cathy Reynolds   5/15/2017 9:00 AM   Office Visit    Description:  Female : 1955   Provider:  MYA Rubin   [facility]           Reason for Visit     Hypertension           Diagnoses this Visit        Comments    Essential hypertension    -  Primary     Vitamin D deficiency         Seasonal allergic rhinitis due to pollen         Osteoporosis, postmenopausal                To Do List           Future Appointments        Provider Department Dept Phone    2017 9:00 AM Masha Elizondo MD [facility] [phone]      Goals (5 Years of Data)     None      [facility] On [facility]     [facility] On Call Nurse Care Line -  Assistance  Unless otherwise directed by your provider, please contact [facility], our nurse care line that is available for  assistance.     Registered nurses in the [facility] On Call Center provide: appointment scheduling, clinical advisement, health education, and other advisory services.  Call: [phone] (toll free)               Medications           Message regarding Medications     Verify the changes and/or additions to your medication regime listed below are the same as discussed with your clinician today.  If any of these changes or additions are incorrect, please notify your healthcare provider.        STOP taking these medications     SIMETHICONE (GAS-X ORAL) Take 1 capsule by mouth once as needed.           Verify that the below list of medications is an accurate representation of the medications you are currently taking.  If none reported, the list may be blank. If incorrect, please contact your healthcare provider. Carry this list with you in case of emergency.           Current Medications     amlodipine (NORVASC) 5 MG tablet Take 1 tablet (5 mg total) by mouth once daily.    CALCIUM  "CARBONATE/VITAMIN D3 (CALTRATE 600 + D ORAL) Take 2 tablets by mouth once daily.    cholecalciferol, vitamin D3, (VITAMIN D3) 2,000 unit Cap Take 1 capsule by mouth once daily.    fluticasone (FLONASE) 50 mcg/actuation nasal spray SPRAY TWICE IN EACH NOSTRIL ONCE DAILY    loratadine (CLARITIN) 10 mg tablet Take 1 tablet (10 mg total) by mouth once daily.           Clinical Reference Information           Your Vitals Were     BP Pulse Temp Height Weight SpO2    148/80 (BP Location: Left arm, Patient Position: Sitting, BP Method: Manual) 85 98.3 °F (36.8 °C) (Oral) 5' 3" (1.6 m) 44 kg (97 lb 1.8 oz) 97%    BMI                17.2 kg/m2          Blood Pressure          Most Recent Value    BP  (!)  148/80      Allergies as of 5/15/2017     Ace Inhibitors    Penicillins      Immunizations Administered on Date of Encounter - 5/15/2017     None      Language Assistance Services     ATTENTION: Language assistance services are available, free of charge. Please call [phone].      ATENCIÓN: Si habla helio, tiene a uribe disposición servicios gratuitos de asistencia lingüística. Llame al [phone].     AMY Ý: N?u b?n nói Ti?ng Vi?t, có các d?ch v? h? tr? ngôn ng? mi?n phí dolly cho b?n. G?i s? [phone].         [facility] complies with applicable Federal civil rights laws and does not discriminate on the basis of race, color, national origin, age, disability, or sex.        "

## 2017-05-15 NOTE — PROGRESS NOTES
Subjective:       Patient ID: Cathy Reynolds is a 61 y.o. female.    Chief Complaint: Hypertension (F/U)    HPI Comments: 61-year-old female presents to the clinic today for follow-up on hypertension.  She states her home blood pressures run anywhere from 104-120/70-80.  She is tolerating the amlodipine 5 mg without any difficulty.  She denies any headaches, dizziness, or blurred vision.  She denies any cardiac chest pain, heart palpitations, shortness breath, or swelling to lower extremities.  Her last visit I switched her losartan to amlodipine due to Dr. Rockwell's request.  Her potassium was 5.4.  Her repeat potassium was 5.1.  She is still thinking about whether she is going to take any osteoporosis medication.  She is compliant with her current medications.    Past Medical History:   Diagnosis Date    AR (allergic rhinitis)     Diverticulosis     Elevated LFTs     borderline - due to OTC herbals - resolved    History of colon polyps     Hyperlipidemia     borderline with high HDL    Hypertension     Mild anxiety     Osteoporosis, postmenopausal     Postmenopausal status     Underweight     Vitamin D deficiency      Past Surgical History:   Procedure Laterality Date     SECTION, LOW TRANSVERSE      COLONOSCOPY      MYOMECTOMY      polyp removal from cervix        reports that she has never smoked. She does not have any smokeless tobacco history on file. She reports that she does not drink alcohol or use illicit drugs.  Review of Systems   Respiratory: Negative for cough, shortness of breath and wheezing.    Cardiovascular: Negative for chest pain, palpitations and leg swelling.   Gastrointestinal: Negative for abdominal pain, diarrhea, nausea and vomiting.   Musculoskeletal: Negative for gait problem.   Neurological: Negative for dizziness, light-headedness and headaches.       Objective:      Physical Exam   Constitutional: She is oriented to person, place, and time. No distress.     Very thin pleasant female in NAD    Eyes: Conjunctivae and EOM are normal. Pupils are equal, round, and reactive to light. Right eye exhibits no discharge. Left eye exhibits no discharge. No scleral icterus.   Neck: Normal range of motion. Neck supple. No JVD present.   Cardiovascular: Normal rate, regular rhythm and normal heart sounds.  Exam reveals no gallop and no friction rub.    No murmur heard.  Pulmonary/Chest: Effort normal and breath sounds normal. No respiratory distress. She has no wheezes. She has no rales.   Abdominal: Soft. Bowel sounds are normal. There is no tenderness. There is no rebound and no guarding.   Musculoskeletal: Normal range of motion. She exhibits no edema.   Lymphadenopathy:     She has no cervical adenopathy.   Neurological: She is alert and oriented to person, place, and time.   Skin: Skin is warm and dry. She is not diaphoretic.   Psychiatric: She has a normal mood and affect.       Assessment:       1. Essential hypertension    2. Vitamin D deficiency    3. Seasonal allergic rhinitis due to pollen    4. Osteoporosis, postmenopausal        Plan:         Essential hypertension  - The current medical regimen is effective;  continue present plan and medications.    Vitamin D deficiency  -The current medical regimen is effective;  continue present plan and medications.    Seasonal allergic rhinitis due to pollen  - can try switching Claritin to Zyrtec      Osteoporosis, postmenopausal  - continue Calcium and Vit D  - she is still thinking about whether or not she will take osteoporosis treatment

## 2017-07-24 ENCOUNTER — OFFICE VISIT (OUTPATIENT)
Dept: FAMILY MEDICINE | Facility: CLINIC | Age: 62
End: 2017-07-24
Payer: COMMERCIAL

## 2017-07-24 VITALS
DIASTOLIC BLOOD PRESSURE: 74 MMHG | HEART RATE: 91 BPM | HEIGHT: 63 IN | WEIGHT: 96.31 LBS | SYSTOLIC BLOOD PRESSURE: 107 MMHG | OXYGEN SATURATION: 99 % | BODY MASS INDEX: 17.07 KG/M2 | TEMPERATURE: 98 F

## 2017-07-24 DIAGNOSIS — M81.0 OSTEOPOROSIS, POSTMENOPAUSAL: ICD-10-CM

## 2017-07-24 DIAGNOSIS — Z00.00 ROUTINE MEDICAL EXAM: Primary | ICD-10-CM

## 2017-07-24 DIAGNOSIS — Z11.1 VISIT FOR TB SKIN TEST: ICD-10-CM

## 2017-07-24 DIAGNOSIS — I10 ESSENTIAL HYPERTENSION: ICD-10-CM

## 2017-07-24 DIAGNOSIS — R63.6 UNDERWEIGHT: ICD-10-CM

## 2017-07-24 DIAGNOSIS — E55.9 VITAMIN D DEFICIENCY: ICD-10-CM

## 2017-07-24 PROCEDURE — 99396 PREV VISIT EST AGE 40-64: CPT | Mod: S$GLB,,, | Performed by: INTERNAL MEDICINE

## 2017-07-24 PROCEDURE — 99999 PR PBB SHADOW E&M-EST. PATIENT-LVL III: CPT | Mod: PBBFAC,,, | Performed by: INTERNAL MEDICINE

## 2017-07-24 NOTE — PROGRESS NOTES
HISTORY OF PRESENT ILLNESS:  Cathy Reynolds is a 62 y.o. female who presents to the clinic today for a routine medical physical exam. Her last physical exam was approximately 1 years(s) ago.        PAST MEDICAL HISTORY:  Past Medical History:   Diagnosis Date    AR (allergic rhinitis)     Diverticulosis     Elevated LFTs     borderline - due to OTC herbals - resolved    History of colon polyps     Hyperlipidemia     borderline with high HDL    Hypertension     Mild anxiety     Osteoporosis, postmenopausal     Postmenopausal status     Underweight     Vitamin D deficiency        PAST SURGICAL HISTORY:  Past Surgical History:   Procedure Laterality Date     SECTION, LOW TRANSVERSE      COLONOSCOPY      MYOMECTOMY      polyp removal from cervix         SOCIAL HISTORY:  Social History     Social History    Marital status:      Spouse name: N/A    Number of children: 2    Years of education: N/A     Occupational History    Not on file.     Social History Main Topics    Smoking status: Never Smoker    Smokeless tobacco: Never Used    Alcohol use No    Drug use: No    Sexual activity: Yes     Partners: Male     Birth control/ protection: None     Other Topics Concern    Not on file     Social History Narrative    No narrative on file       FAMILY HISTORY:  Family History   Problem Relation Age of Onset    Hypertension Mother     Hypertension Brother     Hypertension Sister     Diabetes Sister     Breast cancer Maternal Aunt     Colon cancer Neg Hx     Ovarian cancer Neg Hx        ALLERGIES AND MEDICATIONS: updated and reviewed.  Review of patient's allergies indicates:   Allergen Reactions    Ace inhibitors      Other reaction(s): cough    Penicillins Hives     Medication List with Changes/Refills   Current Medications    AMLODIPINE (NORVASC) 5 MG TABLET    Take 1 tablet (5 mg total) by mouth once daily.    CALCIUM CARBONATE/VITAMIN D3 (CALTRATE 600 + D ORAL)    Take 2  tablets by mouth once daily.    CHOLECALCIFEROL, VITAMIN D3, (VITAMIN D3) 2,000 UNIT CAP    Take 1 capsule by mouth once daily.    FLUTICASONE (FLONASE) 50 MCG/ACTUATION NASAL SPRAY    SPRAY TWICE IN EACH NOSTRIL ONCE DAILY    LORATADINE (CLARITIN) 10 MG TABLET    Take 1 tablet (10 mg total) by mouth once daily.             SCREENING HISTORY:  Health Maintenance       Date Due Completion Date    TETANUS VACCINE 07/05/1971 ---    Influenza Vaccine 08/01/2017 2/22/2016    Override on 2/22/2016: Declined    Override on 12/29/2015: Declined    DEXA SCAN 02/25/2018 2/25/2016    Override on 8/11/2011: Done    Override on 8/11/2011: Done    Mammogram 02/27/2018 2/27/2017    Override on 2/22/2016: Done    Pap Smear with HPV Cotest 01/16/2020 1/16/2017    Override on 8/23/2011: Done    Colonoscopy 05/08/2020 5/8/2015    Override on 12/27/2006: Done    Lipid Panel 01/12/2022 1/12/2017            REVIEW OF SYSTEMS:   The patient reports good dietary habits.  The patient does not exercise regularly.  General ROS: negative for - chills, fever or malaise; she feels like she has less energy since she was changed from losartan to Norvasc for blood pressure  Psychological ROS: negative for - anxiety, depression, sleep disturbances or suicidal ideation  Ophthalmic ROS: negative for - blurry vision or eye pain  ENT ROS: negative for - epistaxis, headaches, nasal congestion, oral lesions or sore throat  Allergy and Immunology ROS: negative for - hives  Hematological and Lymphatic ROS: negative for - swollen lymph nodes  Endocrine ROS: negative for - polydipsia/polyuria or temperature intolerance  Respiratory ROS: no cough, shortness of breath, or wheezing  Cardiovascular ROS: no chest pain or dyspnea on exertion  Gastrointestinal ROS: no abdominal pain, change in bowel habits, or black or bloody stools  Genito-Urinary ROS: no dysuria, trouble voiding, or hematuria  Breast ROS: negative for breast lumps  Musculoskeletal ROS: negative  "for - gait disturbance, joint swelling, muscle pain or muscular weakness  Neurological ROS: no TIA or stroke symptoms  Dermatological ROS: negative for mole changes and rash    Physical Examination:   Vitals:    07/24/17 0940   BP: 107/74   Pulse:    Temp:      Weight: 43.7 kg (96 lb 5.5 oz)   Height: 5' 3" (160 cm)   Body mass index is 17.07 kg/m².    General appearance - alert, well appearing, and in no distress and thin  Mental status - alert, oriented to person, place, and time, normal mood, behavior, speech, dress, motor activity, and thought processes  Eyes - pupils equal and reactive, extraocular eye movements intact, sclera anicteric  Mouth - mucous membranes moist, pharynx normal without lesions  Neck - supple, no significant adenopathy, carotids upstroke normal bilaterally, no bruits, thyroid exam: thyroid is normal in size without nodules or tenderness  Lymphatics - no palpable lymphadenopathy  Chest - clear to auscultation, no wheezes, rales or rhonchi, symmetric air entry  Heart - normal rate and regular rhythm, no gallops noted  Abdomen - soft, nontender, nondistended, no masses or organomegaly  Back exam - full range of motion, no tenderness, palpable spasm or pain on motion  Neurological - alert, oriented, normal speech, no focal findings or movement disorder noted, cranial nerves II through XII intact  Musculoskeletal - no joint tenderness, deformity or swelling, no muscular tenderness noted  Extremities - peripheral pulses normal, no pedal edema, no clubbing or cyanosis  Skin - normal coloration and turgor, no rashes, no suspicious skin lesions noted      ASSESSMENT AND PLAN:  1. Routine medical exam  Counseled on age appropriate medical preventative services including age appropriate cancer screenings, age appropriate eye and dental exams, over all nutritional health, need for a consistent exercise regimen, and an over all push towards maintaining a vigorous and active lifestyle.  Counseled on age " appropriate vaccines and discussed upcoming health care needs based on age/gender. Discussed good sleep hygiene and stress management.     2. Essential hypertension  I do long discussion with the patient regarding her blood pressure medication.  For now she will stick with the amlodipine.  She does not like the fact that she would have to watch her potassium intake if she were to switch back to the losartan.  Blood pressures are controlled at home on current regimen.  We discussed low-salt diet and regular exercise.    3. Osteoporosis, postmenopausal/4. Vitamin D deficiency  We discussed adequate calcium and vitamin D supplementation. She is up to date on her BMD.  She saw endocrinology regarding her last bone density test result.  He will recommended Prolia injections.  The patient states she has investigated all medications and is not interested in being on any of the prescription medications at this time.  She will do better with weightbearing exercise.  Recheck bone density test in February 2018.    5. Underweight  Stable.  Observe.    6. Visit for TB skin test    - tuberculin injection 5 Units; Inject 0.1 mLs (5 Units total) into the skin one time.          Return in about 6 months (around 1/24/2018), or if symptoms worsen or fail to improve, for follow up chronic medical conditions.. or sooner as needed.

## 2017-07-24 NOTE — PROGRESS NOTES
PPD to (L) mid arm administered, nasima well. Instructed to have read within 48-72 hours. verbalized understanding.

## 2017-08-03 DIAGNOSIS — I10 ESSENTIAL HYPERTENSION: Primary | ICD-10-CM

## 2017-08-03 RX ORDER — AMLODIPINE BESYLATE 5 MG/1
5 TABLET ORAL DAILY
Qty: 30 TABLET | Refills: 1 | Status: SHIPPED | OUTPATIENT
Start: 2017-08-03 | End: 2017-10-11 | Stop reason: SDUPTHER

## 2017-08-03 NOTE — TELEPHONE ENCOUNTER
----- Message from Rasheeda Hickey sent at 8/3/2017  3:49 PM CDT -----  Contact: Self  Refill : amlodipine (NORVASC) 5 MG tablet      Pharmacy     [facility] [place] [address] - [facility] [id number] [place] [address] AT [facility]

## 2017-09-25 DIAGNOSIS — J30.1 ACUTE SEASONAL ALLERGIC RHINITIS DUE TO POLLEN: Primary | ICD-10-CM

## 2017-09-25 RX ORDER — FLUTICASONE PROPIONATE 50 MCG
SPRAY, SUSPENSION (ML) NASAL
Qty: 16 G | Refills: 4 | Status: SHIPPED | OUTPATIENT
Start: 2017-09-25 | End: 2019-04-13 | Stop reason: SDUPTHER

## 2017-09-25 RX ORDER — LORATADINE 10 MG/1
10 TABLET ORAL DAILY
Qty: 30 TABLET | Refills: 4 | Status: SHIPPED | OUTPATIENT
Start: 2017-09-25 | End: 2018-10-15 | Stop reason: SDUPTHER

## 2017-09-25 NOTE — TELEPHONE ENCOUNTER
----- Message from Felicita Resendiz sent at 9/25/2017  2:43 PM CDT -----  Contact: self  Patient would like a refill on fluticasone and loratadine (CLARITIN. Patient can be reached at [phone].        Thanks,

## 2017-10-11 DIAGNOSIS — I10 ESSENTIAL HYPERTENSION: ICD-10-CM

## 2017-10-11 RX ORDER — AMLODIPINE BESYLATE 5 MG/1
5 TABLET ORAL DAILY
Qty: 30 TABLET | Refills: 2 | Status: SHIPPED | OUTPATIENT
Start: 2017-10-11 | End: 2018-01-15 | Stop reason: SDUPTHER

## 2017-10-11 NOTE — TELEPHONE ENCOUNTER
Patient pharmacy is requesting a medication refill on Amlodipine 5mg. Last office visit was on 07/24/17 follow up in 6months.

## 2017-10-13 ENCOUNTER — OFFICE VISIT (OUTPATIENT)
Dept: FAMILY MEDICINE | Facility: CLINIC | Age: 62
End: 2017-10-13
Payer: COMMERCIAL

## 2017-10-13 VITALS
TEMPERATURE: 99 F | HEART RATE: 96 BPM | BODY MASS INDEX: 16.91 KG/M2 | DIASTOLIC BLOOD PRESSURE: 84 MMHG | HEIGHT: 63 IN | SYSTOLIC BLOOD PRESSURE: 138 MMHG | WEIGHT: 95.44 LBS | OXYGEN SATURATION: 99 %

## 2017-10-13 DIAGNOSIS — Z23 NEED FOR IMMUNIZATION AGAINST INFLUENZA: ICD-10-CM

## 2017-10-13 DIAGNOSIS — L30.9 ECZEMA, UNSPECIFIED TYPE: ICD-10-CM

## 2017-10-13 DIAGNOSIS — I10 ESSENTIAL HYPERTENSION: ICD-10-CM

## 2017-10-13 DIAGNOSIS — J30.1 SEASONAL ALLERGIC RHINITIS DUE TO POLLEN, UNSPECIFIED CHRONICITY: Primary | ICD-10-CM

## 2017-10-13 PROCEDURE — 99214 OFFICE O/P EST MOD 30 MIN: CPT | Mod: 25,S$GLB,, | Performed by: NURSE PRACTITIONER

## 2017-10-13 PROCEDURE — 90471 IMMUNIZATION ADMIN: CPT | Mod: S$GLB,,, | Performed by: NURSE PRACTITIONER

## 2017-10-13 PROCEDURE — 99999 PR PBB SHADOW E&M-EST. PATIENT-LVL IV: CPT | Mod: PBBFAC,,, | Performed by: NURSE PRACTITIONER

## 2017-10-13 PROCEDURE — 90686 IIV4 VACC NO PRSV 0.5 ML IM: CPT | Mod: S$GLB,,, | Performed by: NURSE PRACTITIONER

## 2017-10-13 RX ORDER — TRIAMCINOLONE ACETONIDE 5 MG/G
CREAM TOPICAL 2 TIMES DAILY
Qty: 30 G | Refills: 2 | Status: SHIPPED | OUTPATIENT
Start: 2017-10-13 | End: 2019-07-17

## 2017-10-13 NOTE — PROGRESS NOTES
Subjective:       Patient ID: Cathy Reynolds is a 62 y.o. female.    Chief Complaint: No chief complaint on file.    62-year-old female presents to the clinic today with complaint of sore throat, postnasal drip, sinus congestion, pressure to right ear, mild coughing, facial pain for the past 2 days.  He denies any fever, chills, wheezing, shortness breath, abdominal pain, nausea, vomiting, or diarrhea.  She denies any headaches, dizziness, or blurred vision.  She denies any cardiac chest pain, heart palpitations, shortness breath, or swelling to lower extremities.  She took Claritin yesterday.  She currently is not using her Flonase. She wants her flu shot today.       Past Medical History:   Diagnosis Date    AR (allergic rhinitis)     Diverticulosis     Elevated LFTs     borderline - due to OTC herbals - resolved    History of colon polyps     Hyperlipidemia     borderline with high HDL    Hypertension     Mild anxiety     Osteoporosis, postmenopausal     Postmenopausal status     Underweight     Vitamin D deficiency      Past Surgical History:   Procedure Laterality Date     SECTION, LOW TRANSVERSE      COLONOSCOPY      MYOMECTOMY      polyp removal from cervix        reports that she has never smoked. She has never used smokeless tobacco. She reports that she does not drink alcohol or use drugs.  Review of Systems   Constitutional: Negative for chills and fever.   HENT: Positive for congestion, postnasal drip and sinus pressure. Negative for ear discharge, ear pain, rhinorrhea, sinus pain and sore throat.    Eyes: Negative for pain, discharge and itching.   Respiratory: Positive for cough. Negative for shortness of breath and wheezing.    Cardiovascular: Negative for chest pain, palpitations and leg swelling.   Gastrointestinal: Negative for abdominal pain, diarrhea, nausea and vomiting.   Musculoskeletal: Negative for gait problem.   Neurological: Negative for dizziness,  light-headedness and headaches.       Objective:      Physical Exam   Constitutional: She is oriented to person, place, and time. She appears well-developed and well-nourished. No distress.   HENT:   Head: Normocephalic and atraumatic.   Right Ear: External ear normal.   Left Ear: External ear normal.   Mouth/Throat: No oropharyngeal exudate.   Post nasal drip both nares pale mucus membranes    Eyes: Conjunctivae and EOM are normal. Pupils are equal, round, and reactive to light. Right eye exhibits no discharge. Left eye exhibits no discharge. No scleral icterus.   Neck: Normal range of motion. Neck supple.   Cardiovascular: Normal rate, regular rhythm and normal heart sounds.  Exam reveals no gallop and no friction rub.    No murmur heard.  Pulmonary/Chest: Effort normal and breath sounds normal. No respiratory distress. She has no wheezes. She has no rales.   Abdominal: Soft. Bowel sounds are normal. There is no tenderness.   Musculoskeletal: Normal range of motion. She exhibits no edema.   Lymphadenopathy:     She has no cervical adenopathy.   Neurological: She is alert and oriented to person, place, and time.   Skin: Rash noted. She is not diaphoretic.   eczema noted right side of neck    Psychiatric: She has a normal mood and affect.       Assessment:       No diagnosis found.    Plan:         Seasonal allergic rhinitis due to pollen, unspecified chronicity  - Restart Flonase  - Continue Claritin  - Mucinex DM as directed    Essential hypertension  - The current medical regimen is effective;  continue present plan and medications.    Need for immunization against influenza  -     Influenza - Quadrivalent (3 years & older) (PF)    Eczema, unspecified type  -     triamcinolone acetonide 0.5% (KENALOG) 0.5 % Crea; Apply topically 2 (two) times daily.  Dispense: 30 g; Refill: 2

## 2017-10-25 ENCOUNTER — TELEPHONE (OUTPATIENT)
Dept: FAMILY MEDICINE | Facility: CLINIC | Age: 62
End: 2017-10-25

## 2017-10-25 DIAGNOSIS — I10 ESSENTIAL HYPERTENSION: Primary | ICD-10-CM

## 2017-10-25 DIAGNOSIS — E78.5 HYPERLIPIDEMIA, UNSPECIFIED HYPERLIPIDEMIA TYPE: ICD-10-CM

## 2017-10-25 NOTE — TELEPHONE ENCOUNTER
----- Message from Hyun Walsh sent at 10/25/2017  4:33 PM CDT -----  Contact: self  Pt is asking for orders for blood work prior to January appointment. She states she will go to [facility] on [address].    Pt call back #   [phone].

## 2018-01-08 ENCOUNTER — TELEPHONE (OUTPATIENT)
Dept: FAMILY MEDICINE | Facility: CLINIC | Age: 63
End: 2018-01-08

## 2018-01-08 NOTE — TELEPHONE ENCOUNTER
----- Message from Jaycob Levine sent at 1/8/2018  1:02 PM CST -----  Contact: Self/[phone]  Patient called to request muscle relaxers. She states that she's experiencing muscle spasms. Thank you.

## 2018-01-09 ENCOUNTER — OFFICE VISIT (OUTPATIENT)
Dept: FAMILY MEDICINE | Facility: CLINIC | Age: 63
End: 2018-01-09
Payer: COMMERCIAL

## 2018-01-09 VITALS
HEIGHT: 63 IN | BODY MASS INDEX: 17.21 KG/M2 | OXYGEN SATURATION: 99 % | SYSTOLIC BLOOD PRESSURE: 138 MMHG | WEIGHT: 97.13 LBS | DIASTOLIC BLOOD PRESSURE: 84 MMHG | HEART RATE: 80 BPM | TEMPERATURE: 98 F

## 2018-01-09 DIAGNOSIS — I10 ESSENTIAL HYPERTENSION: ICD-10-CM

## 2018-01-09 DIAGNOSIS — J30.2 SEASONAL ALLERGIC RHINITIS, UNSPECIFIED CHRONICITY, UNSPECIFIED TRIGGER: ICD-10-CM

## 2018-01-09 DIAGNOSIS — M62.838 MUSCLE SPASM: Primary | ICD-10-CM

## 2018-01-09 PROCEDURE — 99214 OFFICE O/P EST MOD 30 MIN: CPT | Mod: S$GLB,,, | Performed by: NURSE PRACTITIONER

## 2018-01-09 PROCEDURE — 99999 PR PBB SHADOW E&M-EST. PATIENT-LVL IV: CPT | Mod: PBBFAC,,, | Performed by: NURSE PRACTITIONER

## 2018-01-09 RX ORDER — METHOCARBAMOL 500 MG/1
500 TABLET, FILM COATED ORAL 4 TIMES DAILY
Qty: 40 TABLET | Refills: 0 | Status: SHIPPED | OUTPATIENT
Start: 2018-01-09 | End: 2018-02-23

## 2018-01-09 NOTE — PROGRESS NOTES
Subjective:       Patient ID: Cathy Reynolds is a 62 y.o. female.    Chief Complaint: Spasms (Left Buttocks and Thigh 5 days) and Itchy Eye (3 days)    62-year-old female presents to the clinic today with complaint of muscle spasms in left buttocks and left thigh for the past 5 days.  She states a week ago she was in the kitchen and her leg gave out while she was walking.  She thinks this is possibly what caused the spasms.  She denies any back pain, numbness, tingling, or weakness to lower extremities.  She denies any cardiac chest pain, heart palpitations, shortness breath, or swelling to lower extremities.  She denies any headaches, dizziness, or blurred vision.  Her repeat blood pressure is 138/84.       Past Medical History:   Diagnosis Date    AR (allergic rhinitis)     Diverticulosis     Elevated LFTs     borderline - due to OTC herbals - resolved    History of colon polyps     Hyperlipidemia     borderline with high HDL    Hypertension     Mild anxiety     Osteoporosis, postmenopausal     Postmenopausal status     Underweight     Vitamin D deficiency      Past Surgical History:   Procedure Laterality Date     SECTION, LOW TRANSVERSE      COLONOSCOPY      MYOMECTOMY      polyp removal from cervix        reports that she has never smoked. She has never used smokeless tobacco. She reports that she does not drink alcohol or use drugs.  Review of Systems   Respiratory: Negative for cough, shortness of breath and wheezing.    Cardiovascular: Negative for chest pain, palpitations and leg swelling.   Gastrointestinal: Negative for abdominal pain, blood in stool, constipation, diarrhea, nausea and vomiting.   Musculoskeletal: Negative for gait problem.        Spasms in buttock and left upper leg    Skin: Negative for rash.   Neurological: Negative for dizziness, light-headedness and headaches.       Objective:      Physical Exam   Constitutional: She is oriented to person, place, and time.  She appears well-developed and well-nourished. No distress.   Eyes: Conjunctivae and EOM are normal. Pupils are equal, round, and reactive to light. Right eye exhibits no discharge. Left eye exhibits no discharge. No scleral icterus.   Neck: Normal range of motion. Neck supple. No JVD present.   Cardiovascular: Normal rate, regular rhythm and normal heart sounds.    No murmur heard.  Pulmonary/Chest: Effort normal and breath sounds normal. No respiratory distress. She has no wheezes. She has no rales.   Abdominal: Soft. Bowel sounds are normal. There is no tenderness.   Musculoskeletal: Normal range of motion. She exhibits no edema.   Tenderness over left buttock spine non-tender to palpation non-tender paraspinal muscles    Neurological: She is alert and oriented to person, place, and time.   Skin: Skin is warm and dry. She is not diaphoretic.   Psychiatric: She has a normal mood and affect.       Assessment:       1. Muscle spasm    2. Seasonal allergic rhinitis, unspecified chronicity, unspecified trigger    3. Essential hypertension        Plan:         Muscle spasm  -     methocarbamol (ROBAXIN) 500 MG Tab; Take 1 tablet (500 mg total) by mouth 4 (four) times daily.  Dispense: 40 tablet; Refill: 0    Seasonal allergic rhinitis, unspecified chronicity, unspecified trigger  - continue Claritin    Essential hypertension  - The current medical regimen is effective;  continue present plan and medications.

## 2018-01-15 DIAGNOSIS — I10 ESSENTIAL HYPERTENSION: ICD-10-CM

## 2018-01-15 RX ORDER — AMLODIPINE BESYLATE 5 MG/1
5 TABLET ORAL DAILY
Qty: 30 TABLET | Refills: 0 | Status: SHIPPED | OUTPATIENT
Start: 2018-01-15 | End: 2018-02-18 | Stop reason: SDUPTHER

## 2018-01-15 NOTE — TELEPHONE ENCOUNTER
----- Message from Bailey Cunningham sent at 1/15/2018 12:36 PM CST -----  Contact: Self  REFILL: amlodipine (NORVASC) 5 MG tablet    PHARMACY: [facility]

## 2018-02-06 ENCOUNTER — TELEPHONE (OUTPATIENT)
Dept: FAMILY MEDICINE | Facility: CLINIC | Age: 63
End: 2018-02-06

## 2018-02-06 DIAGNOSIS — Z12.31 ENCOUNTER FOR SCREENING MAMMOGRAM FOR MALIGNANT NEOPLASM OF BREAST: Primary | ICD-10-CM

## 2018-02-06 NOTE — TELEPHONE ENCOUNTER
----- Message from Bailey Cunningham sent at 2/6/2018  1:06 PM CST -----  Contact: Self  Pt called to request order for mammogram. Pt can be reached @ [phone].

## 2018-02-08 ENCOUNTER — TELEPHONE (OUTPATIENT)
Dept: FAMILY MEDICINE | Facility: CLINIC | Age: 63
End: 2018-02-08

## 2018-02-08 DIAGNOSIS — I10 ESSENTIAL HYPERTENSION: Primary | ICD-10-CM

## 2018-02-08 DIAGNOSIS — M81.0 OSTEOPOROSIS, POSTMENOPAUSAL: ICD-10-CM

## 2018-02-08 NOTE — TELEPHONE ENCOUNTER
Orders signed - please schedule.  Mammograms are generally done until age 75 (longer if there have been abnormalities or a strong family history).

## 2018-02-08 NOTE — TELEPHONE ENCOUNTER
----- Message from Hyun Walsh sent at 2/8/2018 12:51 PM CST -----  Contact: self  Pt is asking for orders for labs for 6 month. Pt would also like to know at what age she will no longer have to take mammo. [phone].  She is asking if a message can be left if she does not answer.

## 2018-02-17 ENCOUNTER — LAB VISIT (OUTPATIENT)
Dept: LAB | Facility: HOSPITAL | Age: 63
End: 2018-02-17
Attending: INTERNAL MEDICINE
Payer: COMMERCIAL

## 2018-02-17 DIAGNOSIS — I10 ESSENTIAL HYPERTENSION: ICD-10-CM

## 2018-02-17 LAB
ALBUMIN SERPL BCP-MCNC: 3.7 G/DL
ALP SERPL-CCNC: 56 U/L
ALT SERPL W/O P-5'-P-CCNC: 47 U/L
ANION GAP SERPL CALC-SCNC: 9 MMOL/L
AST SERPL-CCNC: 113 U/L
BASOPHILS # BLD AUTO: 0.02 K/UL
BASOPHILS NFR BLD: 0.3 %
BILIRUB SERPL-MCNC: 0.5 MG/DL
BUN SERPL-MCNC: 17 MG/DL
CALCIUM SERPL-MCNC: 10 MG/DL
CHLORIDE SERPL-SCNC: 103 MMOL/L
CHOLEST SERPL-MCNC: 211 MG/DL
CHOLEST/HDLC SERPL: 2.5 {RATIO}
CO2 SERPL-SCNC: 29 MMOL/L
CREAT SERPL-MCNC: 0.7 MG/DL
DIFFERENTIAL METHOD: ABNORMAL
EOSINOPHIL # BLD AUTO: 0 K/UL
EOSINOPHIL NFR BLD: 0.3 %
ERYTHROCYTE [DISTWIDTH] IN BLOOD BY AUTOMATED COUNT: 13.4 %
EST. GFR  (AFRICAN AMERICAN): >60 ML/MIN/1.73 M^2
EST. GFR  (NON AFRICAN AMERICAN): >60 ML/MIN/1.73 M^2
GLUCOSE SERPL-MCNC: 83 MG/DL
HCT VFR BLD AUTO: 40.6 %
HDLC SERPL-MCNC: 85 MG/DL
HDLC SERPL: 40.3 %
HGB BLD-MCNC: 12.1 G/DL
IMM GRANULOCYTES # BLD AUTO: 0.01 K/UL
IMM GRANULOCYTES NFR BLD AUTO: 0.2 %
LDLC SERPL CALC-MCNC: 114.8 MG/DL
LYMPHOCYTES # BLD AUTO: 2.2 K/UL
LYMPHOCYTES NFR BLD: 37.8 %
MCH RBC QN AUTO: 25.9 PG
MCHC RBC AUTO-ENTMCNC: 29.8 G/DL
MCV RBC AUTO: 87 FL
MONOCYTES # BLD AUTO: 0.6 K/UL
MONOCYTES NFR BLD: 9.4 %
NEUTROPHILS # BLD AUTO: 3.1 K/UL
NEUTROPHILS NFR BLD: 52 %
NONHDLC SERPL-MCNC: 126 MG/DL
NRBC BLD-RTO: 0 /100 WBC
PLATELET # BLD AUTO: 233 K/UL
PMV BLD AUTO: 11.4 FL
POTASSIUM SERPL-SCNC: 4.9 MMOL/L
PROT SERPL-MCNC: 7.3 G/DL
RBC # BLD AUTO: 4.67 M/UL
SODIUM SERPL-SCNC: 141 MMOL/L
TRIGL SERPL-MCNC: 56 MG/DL
WBC # BLD AUTO: 5.93 K/UL

## 2018-02-17 PROCEDURE — 36415 COLL VENOUS BLD VENIPUNCTURE: CPT | Mod: PO

## 2018-02-17 PROCEDURE — 85025 COMPLETE CBC W/AUTO DIFF WBC: CPT

## 2018-02-17 PROCEDURE — 80053 COMPREHEN METABOLIC PANEL: CPT

## 2018-02-17 PROCEDURE — 80061 LIPID PANEL: CPT

## 2018-02-18 DIAGNOSIS — I10 ESSENTIAL HYPERTENSION: ICD-10-CM

## 2018-02-19 RX ORDER — AMLODIPINE BESYLATE 5 MG/1
TABLET ORAL
Qty: 30 TABLET | Refills: 0 | Status: SHIPPED | OUTPATIENT
Start: 2018-02-19 | End: 2018-02-23 | Stop reason: SDUPTHER

## 2018-02-23 ENCOUNTER — OFFICE VISIT (OUTPATIENT)
Dept: FAMILY MEDICINE | Facility: CLINIC | Age: 63
End: 2018-02-23
Payer: COMMERCIAL

## 2018-02-23 VITALS
OXYGEN SATURATION: 97 % | BODY MASS INDEX: 16.98 KG/M2 | DIASTOLIC BLOOD PRESSURE: 65 MMHG | SYSTOLIC BLOOD PRESSURE: 109 MMHG | TEMPERATURE: 98 F | HEART RATE: 79 BPM | HEIGHT: 63 IN | WEIGHT: 95.81 LBS

## 2018-02-23 DIAGNOSIS — H69.90 DYSFUNCTION OF EUSTACHIAN TUBE, UNSPECIFIED LATERALITY: ICD-10-CM

## 2018-02-23 DIAGNOSIS — E55.9 VITAMIN D DEFICIENCY: ICD-10-CM

## 2018-02-23 DIAGNOSIS — I10 ESSENTIAL HYPERTENSION: Primary | ICD-10-CM

## 2018-02-23 DIAGNOSIS — M81.0 OSTEOPOROSIS, POSTMENOPAUSAL: ICD-10-CM

## 2018-02-23 DIAGNOSIS — Z23 NEED FOR SHINGLES VACCINE: ICD-10-CM

## 2018-02-23 DIAGNOSIS — R79.89 ELEVATED LFTS: ICD-10-CM

## 2018-02-23 DIAGNOSIS — Z23 NEED FOR TDAP VACCINATION: ICD-10-CM

## 2018-02-23 DIAGNOSIS — J30.1 SEASONAL ALLERGIC RHINITIS DUE TO POLLEN, UNSPECIFIED CHRONICITY: ICD-10-CM

## 2018-02-23 DIAGNOSIS — H61.21 IMPACTED CERUMEN OF RIGHT EAR: ICD-10-CM

## 2018-02-23 DIAGNOSIS — R63.6 UNDERWEIGHT: ICD-10-CM

## 2018-02-23 PROCEDURE — 99214 OFFICE O/P EST MOD 30 MIN: CPT | Mod: S$GLB,,, | Performed by: INTERNAL MEDICINE

## 2018-02-23 PROCEDURE — 99999 PR PBB SHADOW E&M-EST. PATIENT-LVL III: CPT | Mod: PBBFAC,,, | Performed by: INTERNAL MEDICINE

## 2018-02-23 PROCEDURE — 3008F BODY MASS INDEX DOCD: CPT | Mod: S$GLB,,, | Performed by: INTERNAL MEDICINE

## 2018-02-23 RX ORDER — AMLODIPINE BESYLATE 5 MG/1
TABLET ORAL
Qty: 90 TABLET | Refills: 1 | Status: SHIPPED | OUTPATIENT
Start: 2018-02-23 | End: 2018-04-03 | Stop reason: SDUPTHER

## 2018-02-23 NOTE — PROGRESS NOTES
"HISTORY OF PRESENT ILLNESS:  Cathy Reynolds is a 62 y.o. female who presents to the clinic today for Hyperlipidemia; Hypertension; and Follow-up  .  The patient presents to clinic today for follow-up of her hypertension and osteoporosis.  Blood pressures at home are well controlled. The patient denies any problems with cardiac chest pain, dizziness, palpitations, orthopnea, or lower extremity edema.  The patient reports compliance with current medication- patient denies missing any  medication doses.  She is already scheduled for mammogram and bone density test.  She denies abdominal pain, temperature intolerance, or unexplained changes in her weight.  She does have some ALLERGIES.  She uses ALLERGY medication as needed.  She has felt some "popping" in her right ear.  She denies any changes in hearing.  No ear pain or discharge.      PAST MEDICAL HISTORY:  Past Medical History:   Diagnosis Date    AR (allergic rhinitis)     Diverticulosis     Elevated LFTs     borderline - due to OTC herbals - resolved    History of colon polyps     Hyperlipidemia     borderline with high HDL    Hypertension     Mild anxiety     Osteoporosis, postmenopausal     Postmenopausal status     Underweight     Vitamin D deficiency        PAST SURGICAL HISTORY:  Past Surgical History:   Procedure Laterality Date     SECTION, LOW TRANSVERSE      COLONOSCOPY      MYOMECTOMY      polyp removal from cervix         SOCIAL HISTORY:  Social History     Social History    Marital status:      Spouse name: N/A    Number of children: 2    Years of education: N/A     Occupational History    Not on file.     Social History Main Topics    Smoking status: Never Smoker    Smokeless tobacco: Never Used    Alcohol use No    Drug use: No    Sexual activity: Yes     Partners: Male     Birth control/ protection: None     Other Topics Concern    Not on file     Social History Narrative    No narrative on file "       FAMILY HISTORY:  Family History   Problem Relation Age of Onset    Hypertension Mother     Hypertension Brother     Hypertension Sister     Diabetes Sister     Breast cancer Maternal Aunt     Colon cancer Neg Hx     Ovarian cancer Neg Hx        ALLERGIES AND MEDICATIONS: updated and reviewed.  Review of patient's allergies indicates:   Allergen Reactions    Ace inhibitors      Other reaction(s): cough    Penicillins Hives     Medication List with Changes/Refills   Current Medications    CALCIUM CARBONATE/VITAMIN D3 (CALTRATE 600 + D ORAL)    Take 2 tablets by mouth once daily.    CHOLECALCIFEROL, VITAMIN D3, (VITAMIN D3) 2,000 UNIT CAP    Take 2 capsules by mouth once daily.     FLUTICASONE (FLONASE) 50 MCG/ACTUATION NASAL SPRAY    SPRAY TWICE IN EACH NOSTRIL ONCE DAILY    LORATADINE (CLARITIN) 10 MG TABLET    Take 1 tablet (10 mg total) by mouth once daily.    TRIAMCINOLONE ACETONIDE 0.5% (KENALOG) 0.5 % CREA    Apply topically 2 (two) times daily.   Changed and/or Refilled Medications    Modified Medication Previous Medication    AMLODIPINE (NORVASC) 5 MG TABLET amLODIPine (NORVASC) 5 MG tablet       TAKE 1 TABLET(5 MG) BY MOUTH EVERY DAY    TAKE 1 TABLET(5 MG) BY MOUTH EVERY DAY   Discontinued Medications    METHOCARBAMOL (ROBAXIN) 500 MG TAB    Take 1 tablet (500 mg total) by mouth 4 (four) times daily.          CARE TEAM:  Patient Care Team:  Masha Elizondo MD as PCP - General (Internal Medicine)         REVIEW OF SYSTEMS:  Review of Systems   Constitutional: Negative for chills, fever, malaise/fatigue and weight loss.   HENT: Negative for congestion, ear pain, nosebleeds and sore throat.    Eyes: Negative for blurred vision, pain and discharge.   Respiratory: Negative for cough, hemoptysis, shortness of breath and wheezing.    Cardiovascular: Negative for chest pain, palpitations, claudication and leg swelling.   Gastrointestinal: Negative for abdominal pain, blood in stool, constipation,  "diarrhea, heartburn, melena, nausea and vomiting.   Genitourinary: Negative for dysuria, frequency, hematuria and urgency.   Musculoskeletal: Negative for joint pain and myalgias.   Skin: Negative for itching and rash.   Neurological: Negative for dizziness, tremors, focal weakness, seizures, weakness and headaches.   Endo/Heme/Allergies: Negative for polydipsia. Does not bruise/bleed easily.   Psychiatric/Behavioral: Negative for depression, memory loss, substance abuse and suicidal ideas. The patient is not nervous/anxious and does not have insomnia.          PHYSICAL EXAM:   Vitals:    02/23/18 1106   BP: 109/65   Pulse:    Temp:      Weight: 43.5 kg (95 lb 12.6 oz)   Height: 5' 3" (160 cm)   Body mass index is 16.97 kg/m².     General appearance - alert, well appearing, and in no distress and thin  Mental status - alert, oriented to person, place, and time, normal mood, behavior, speech, dress, motor activity, and thought processes  Eyes - pupils equal and reactive, extraocular eye movements intact, sclera anicteric  Ears - left ear normal, right TM obscured by cerumen  Nose - normal nontender sinuses and mucosal congestion  Mouth - mucous membranes moist, pharynx normal without lesions  Neck - supple, no significant adenopathy, carotids upstroke normal bilaterally, no bruits, thyroid exam: thyroid is normal in size without nodules or tenderness  Lymphatics - no palpable lymphadenopathy  Chest - clear to auscultation, no wheezes, rales or rhonchi, symmetric air entry  Heart - normal rate and regular rhythm, no gallops noted  Neurological - alert, oriented, normal speech, no focal findings or movement disorder noted, cranial nerves II through XII intact  Musculoskeletal - no joint tenderness, deformity or swelling, no muscular tenderness noted  Extremities - peripheral pulses normal, no pedal edema, no clubbing or cyanosis  Skin - normal coloration and turgor, no rashes, no suspicious skin lesions " noted      Results for orders placed or performed in visit on 02/17/18   CBC auto differential   Result Value Ref Range    WBC 5.93 3.90 - 12.70 K/uL    RBC 4.67 4.00 - 5.40 M/uL    Hemoglobin 12.1 12.0 - 16.0 g/dL    Hematocrit 40.6 37.0 - 48.5 %    MCV 87 82 - 98 fL    MCH 25.9 (L) 27.0 - 31.0 pg    MCHC 29.8 (L) 32.0 - 36.0 g/dL    RDW 13.4 11.5 - 14.5 %    Platelets 233 150 - 350 K/uL    MPV 11.4 9.2 - 12.9 fL    Immature Granulocytes 0.2 0.0 - 0.5 %    Gran # (ANC) 3.1 1.8 - 7.7 K/uL    Immature Grans (Abs) 0.01 0.00 - 0.04 K/uL    Lymph # 2.2 1.0 - 4.8 K/uL    Mono # 0.6 0.3 - 1.0 K/uL    Eos # 0.0 0.0 - 0.5 K/uL    Baso # 0.02 0.00 - 0.20 K/uL    nRBC 0 0 /100 WBC    Gran% 52.0 38.0 - 73.0 %    Lymph% 37.8 18.0 - 48.0 %    Mono% 9.4 4.0 - 15.0 %    Eosinophil% 0.3 0.0 - 8.0 %    Basophil% 0.3 0.0 - 1.9 %    Differential Method Automated    Comprehensive metabolic panel   Result Value Ref Range    Sodium 141 136 - 145 mmol/L    Potassium 4.9 3.5 - 5.1 mmol/L    Chloride 103 95 - 110 mmol/L    CO2 29 23 - 29 mmol/L    Glucose 83 70 - 110 mg/dL    BUN, Bld 17 8 - 23 mg/dL    Creatinine 0.7 0.5 - 1.4 mg/dL    Calcium 10.0 8.7 - 10.5 mg/dL    Total Protein 7.3 6.0 - 8.4 g/dL    Albumin 3.7 3.5 - 5.2 g/dL    Total Bilirubin 0.5 0.1 - 1.0 mg/dL    Alkaline Phosphatase 56 55 - 135 U/L     (H) 10 - 40 U/L    ALT 47 (H) 10 - 44 U/L    Anion Gap 9 8 - 16 mmol/L    eGFR if African American >60.0 >60 mL/min/1.73 m^2    eGFR if non African American >60.0 >60 mL/min/1.73 m^2   Lipid panel   Result Value Ref Range    Cholesterol 211 (H) 120 - 199 mg/dL    Triglycerides 56 30 - 150 mg/dL    HDL 85 (H) 40 - 75 mg/dL    LDL Cholesterol 114.8 63.0 - 159.0 mg/dL    HDL/Chol Ratio 40.3 20.0 - 50.0 %    Total Cholesterol/HDL Ratio 2.5 2.0 - 5.0    Non-HDL Cholesterol 126 mg/dL          ASSESSMENT AND PLAN:  1. Essential hypertension  Discussed sodium restriction, maintaining ideal body weight and regular exercise program as  physiologic means to achieve blood pressure control. The patient will strive towards this. The current medical regimen is effective;  continue present plan and medications. Recommended patient to check home readings to monitor and see me for followup as scheduled or sooner as needed. Patient was educated that both decongestant and anti-inflammatory medication may raise blood pressure.   - amLODIPine (NORVASC) 5 MG tablet; TAKE 1 TABLET(5 MG) BY MOUTH EVERY DAY  Dispense: 90 tablet; Refill: 1    2. Osteoporosis, postmenopausal/3. Vitamin D deficiency  We discussed adequate calcium and vitamin D supplementation. We discussed fall precautions. She scheduled for her BMD. No need for Rx tx at this time.     4. Seasonal allergic rhinitis due to pollen, unspecified chronicity/5. Dysfunction of Eustachian tube, unspecified laterality  We discussed several treatment strategies: antihistamine at bedtime, flonase in the morning. We also discussed saline nasal rinse in the evening as needed. I recommended allergy covers for pillow and mattress. Patient will let me know if symptoms worsen or persist.     6. Impacted cerumen of right ear  Pressure cleaned in the office. Patient reported some improvement in her symptoms. She will return to the clinic or see ENT if symptoms worsen or persist.    7. Elevated LFTs  Asymptomatic.  She is taking some over-the-counter supplements.  She will discontinue those.  Recheck in one month.  - Hepatic function panel; Future    8. Underweight  Stable.  Observe.    9. Need for Tdap vaccination  Patient declined today.    10. Need for shingles vaccine  We'll wait for new vaccine.           Follow-up in about 6 months (around 8/23/2018), or if symptoms worsen or fail to improve, for annual exam. or sooner as needed.

## 2018-03-05 ENCOUNTER — HOSPITAL ENCOUNTER (OUTPATIENT)
Dept: RADIOLOGY | Facility: CLINIC | Age: 63
Discharge: HOME | End: 2018-03-05
Attending: INTERNAL MEDICINE
Payer: COMMERCIAL

## 2018-03-05 ENCOUNTER — HOSPITAL ENCOUNTER (OUTPATIENT)
Dept: RADIOLOGY | Facility: HOSPITAL | Age: 63
Discharge: HOME | End: 2018-03-05
Attending: INTERNAL MEDICINE
Payer: COMMERCIAL

## 2018-03-05 DIAGNOSIS — M81.0 OSTEOPOROSIS, POSTMENOPAUSAL: ICD-10-CM

## 2018-03-05 DIAGNOSIS — Z12.31 ENCOUNTER FOR SCREENING MAMMOGRAM FOR MALIGNANT NEOPLASM OF BREAST: ICD-10-CM

## 2018-03-05 PROCEDURE — 77067 SCR MAMMO BI INCL CAD: CPT | Mod: 26,,, | Performed by: RADIOLOGY

## 2018-03-05 PROCEDURE — 77080 DXA BONE DENSITY AXIAL: CPT | Mod: TC,PO

## 2018-03-05 PROCEDURE — 77067 SCR MAMMO BI INCL CAD: CPT | Mod: TC,PO

## 2018-03-05 PROCEDURE — 77080 DXA BONE DENSITY AXIAL: CPT | Mod: 26,,, | Performed by: INTERNAL MEDICINE

## 2018-03-05 PROCEDURE — 77063 BREAST TOMOSYNTHESIS BI: CPT | Mod: 26,,, | Performed by: RADIOLOGY

## 2018-03-16 ENCOUNTER — TELEPHONE (OUTPATIENT)
Dept: FAMILY MEDICINE | Facility: CLINIC | Age: 63
End: 2018-03-16

## 2018-03-16 NOTE — TELEPHONE ENCOUNTER
Please call patient: Her Bone Mineral Density test showed osteoporosis. It is recommended that we start prescription medication. Please schedule an office visit at her earliest convenience to discuss our options.

## 2018-03-19 ENCOUNTER — TELEPHONE (OUTPATIENT)
Dept: FAMILY MEDICINE | Facility: CLINIC | Age: 63
End: 2018-03-19

## 2018-03-20 ENCOUNTER — TELEPHONE (OUTPATIENT)
Dept: FAMILY MEDICINE | Facility: CLINIC | Age: 63
End: 2018-03-20

## 2018-03-20 NOTE — TELEPHONE ENCOUNTER
----- Message from Renetta Karimi sent at 3/20/2018 12:37 PM CDT -----  Contact: Self   Patient says she would like the name of the medication Dr. Elizondo recommends for her bones, Patient says please leave a detailed message. Please call at [phone].

## 2018-03-23 ENCOUNTER — TELEPHONE (OUTPATIENT)
Dept: FAMILY MEDICINE | Facility: CLINIC | Age: 63
End: 2018-03-23

## 2018-03-23 NOTE — TELEPHONE ENCOUNTER
Spoke with pt, scheduled a follow up appt.  Pt said the radiology recommended she take Raw Calcium.  She wants to know is this ok, to take?  .Patient verbalized understandings.

## 2018-03-24 ENCOUNTER — LAB VISIT (OUTPATIENT)
Dept: LAB | Facility: HOSPITAL | Age: 63
End: 2018-03-24
Attending: INTERNAL MEDICINE
Payer: COMMERCIAL

## 2018-03-24 DIAGNOSIS — R79.89 ELEVATED LFTS: ICD-10-CM

## 2018-03-24 LAB
ALBUMIN SERPL BCP-MCNC: 3.6 G/DL
ALP SERPL-CCNC: 61 U/L
ALT SERPL W/O P-5'-P-CCNC: 49 U/L
AST SERPL-CCNC: 99 U/L
BILIRUB DIRECT SERPL-MCNC: 0.2 MG/DL
BILIRUB SERPL-MCNC: 0.4 MG/DL
PROT SERPL-MCNC: 7 G/DL

## 2018-03-24 PROCEDURE — 80076 HEPATIC FUNCTION PANEL: CPT

## 2018-03-24 PROCEDURE — 36415 COLL VENOUS BLD VENIPUNCTURE: CPT | Mod: PO

## 2018-03-26 ENCOUNTER — TELEPHONE (OUTPATIENT)
Dept: FAMILY MEDICINE | Facility: CLINIC | Age: 63
End: 2018-03-26

## 2018-03-26 NOTE — TELEPHONE ENCOUNTER
Please call patient: her liver enzymes are still elevated (I assume she stopped the herbal supplements as discussed). I recommend she see a liver specialist for further evaluation.

## 2018-03-26 NOTE — TELEPHONE ENCOUNTER
Please call patient: her liver enzymes are still elevated (I assume she stopped the herbal supplements as discussed). I recommend she see a liver specialist for further evaluation.    Spoke with the patient, she thinks the amlodipine has something to do with her liver enzymes being elevated.  Patient goggled the medication and this is a side effect of the medication.  Please advise.  .Patient verbalized understandings.

## 2018-03-27 NOTE — TELEPHONE ENCOUNTER
Spoke with patient. States that she would like to have repeat testing prior to going to Hepatology since she stopped taking Flavonoid 2 tablets 3 times daily, probiotic,  calcium, vitamin D and hair, skin and nail vitamins.     Please advise

## 2018-03-27 NOTE — TELEPHONE ENCOUNTER
When did she stop taking the supplements? I thought she had stopped taking them in February when her liver enzymes were first noted to be elevated.  Her liver enzymes were elevated 2/17 and when we repeated them 3/24 they were not improved.

## 2018-03-27 NOTE — TELEPHONE ENCOUNTER
----- Message from Mony Quinn sent at 3/27/2018  5:22 PM CDT -----  Contact: pt can be reached at [phone]  Pt missed a call and would like the nurse to return their call.        Thank you!

## 2018-03-29 NOTE — TELEPHONE ENCOUNTER
Spoke w/patient, states she is not taking the supplements, states she does not recall being told that her enzymes were elevated therefore she did not stop taking them in February.

## 2018-03-29 NOTE — TELEPHONE ENCOUNTER
Spoke with patient. States that she was advised liver enzymes were elevated at office visit 3-5-18 and that is when she stopped her supplements.     She has scheduled follow up appointment to discuss results, supplements, medication on 4-3-18

## 2018-04-03 ENCOUNTER — OFFICE VISIT (OUTPATIENT)
Dept: FAMILY MEDICINE | Facility: CLINIC | Age: 63
End: 2018-04-03
Payer: COMMERCIAL

## 2018-04-03 VITALS
HEART RATE: 84 BPM | DIASTOLIC BLOOD PRESSURE: 82 MMHG | WEIGHT: 96.44 LBS | HEIGHT: 63 IN | TEMPERATURE: 98 F | OXYGEN SATURATION: 96 % | BODY MASS INDEX: 17.09 KG/M2 | SYSTOLIC BLOOD PRESSURE: 142 MMHG

## 2018-04-03 DIAGNOSIS — M81.0 OSTEOPOROSIS, POSTMENOPAUSAL: Primary | ICD-10-CM

## 2018-04-03 DIAGNOSIS — R79.89 ELEVATED LFTS: ICD-10-CM

## 2018-04-03 DIAGNOSIS — I10 ESSENTIAL HYPERTENSION: ICD-10-CM

## 2018-04-03 PROCEDURE — 3079F DIAST BP 80-89 MM HG: CPT | Mod: CPTII,S$GLB,, | Performed by: NURSE PRACTITIONER

## 2018-04-03 PROCEDURE — 99999 PR PBB SHADOW E&M-EST. PATIENT-LVL III: CPT | Mod: PBBFAC,,, | Performed by: NURSE PRACTITIONER

## 2018-04-03 PROCEDURE — 3077F SYST BP >= 140 MM HG: CPT | Mod: CPTII,S$GLB,, | Performed by: NURSE PRACTITIONER

## 2018-04-03 PROCEDURE — 99214 OFFICE O/P EST MOD 30 MIN: CPT | Mod: S$GLB,,, | Performed by: NURSE PRACTITIONER

## 2018-04-03 RX ORDER — ALENDRONATE SODIUM 70 MG/1
70 TABLET ORAL
Qty: 12 TABLET | Refills: 3 | Status: SHIPPED | OUTPATIENT
Start: 2018-04-03 | End: 2018-07-10 | Stop reason: SDUPTHER

## 2018-04-03 RX ORDER — AMLODIPINE BESYLATE 5 MG/1
TABLET ORAL
Qty: 90 TABLET | Refills: 1 | Status: SHIPPED | OUTPATIENT
Start: 2018-04-03 | End: 2019-05-06 | Stop reason: SDUPTHER

## 2018-04-03 NOTE — PROGRESS NOTES
Subjective:       Patient ID: Cathy Reynolds is a 62 y.o. female.    Chief Complaint: Results (Discuss Labs)    62-year-old female presents to the clinic today for follow-up on hypertension.  She is also here today to discuss liver function tests and recent bone dexa scan.  She states her blood pressure always is low at home.  She says when her blood pressures run low she does not take her blood pressure medication. Therefore, she does not take her amlodipine 5 mg on a daily basis.  Her blood pressure today is 142/82.  She has not taken her blood pressure medication yet today.  I stressed the importance of taking her blood pressure medication on a daily basis.  She says that she did not stop taking her herbal medications until she was told just recently that her LFTs were elevated.  She will stop all herbal medications at this time and I will recheck her LFTs in 6 weeks. She would like to try Raw Calcium Garden of Life for her Osteoporosis. I told her that it was not approved by FDA and I recommended she try Fosamax or go to endocrine. She is willing to try Fosamax.  She denies any headaches, dizziness, or blurred vision.  She denies any cardiac chest pain, heart palpitations, shortness breath, or swelling to lower extremities.      Past Medical History:   Diagnosis Date    AR (allergic rhinitis)     Diverticulosis     Elevated LFTs     borderline - due to OTC herbals - resolved    History of colon polyps     Hyperlipidemia     borderline with high HDL    Hypertension     Mild anxiety     Osteoporosis, postmenopausal     Postmenopausal status     Underweight     Vitamin D deficiency      Past Surgical History:   Procedure Laterality Date     SECTION, LOW TRANSVERSE      COLONOSCOPY      MYOMECTOMY      polyp removal from cervix        reports that she has never smoked. She has never used smokeless tobacco. She reports that she does not drink alcohol or use drugs.  Review of Systems    Respiratory: Negative for cough, shortness of breath and wheezing.    Cardiovascular: Negative for chest pain, palpitations and leg swelling.   Gastrointestinal: Negative for abdominal pain, blood in stool, constipation, diarrhea, nausea and vomiting.   Musculoskeletal: Negative for gait problem.   Skin: Negative for rash.   Neurological: Negative for dizziness, light-headedness and headaches.       Objective:      Physical Exam   Constitutional: She is oriented to person, place, and time. She appears well-developed and well-nourished. No distress.   Eyes: Conjunctivae and EOM are normal. Pupils are equal, round, and reactive to light. Right eye exhibits no discharge. Left eye exhibits no discharge. No scleral icterus.   Neck: Normal range of motion. Neck supple. No JVD present.   Cardiovascular: Normal rate, regular rhythm and normal heart sounds.    No murmur heard.  Pulmonary/Chest: Effort normal and breath sounds normal. No respiratory distress. She has no wheezes. She has no rales.   Abdominal: Soft. Bowel sounds are normal. There is no tenderness.   Musculoskeletal: Normal range of motion. She exhibits no edema.   Neurological: She is alert and oriented to person, place, and time.   Skin: Skin is warm and dry. She is not diaphoretic.   Psychiatric: She has a normal mood and affect.       Assessment:       1. Osteoporosis, postmenopausal    2. Essential hypertension    3. Elevated LFTs        Plan:         Osteoporosis, postmenopausal  - Start Fosamax once weekly    Essential hypertension  -     amLODIPine (NORVASC) 5 MG tablet; TAKE 1 TABLET(5 MG) BY MOUTH EVERY DAY  Dispense: 90 tablet; Refill: 1  - stressed the importance of taking her blood pressure medication every day   - blood pressure check up with nurse in 6 weeks with blood work up     Elevated LFTs  -     Hepatic function panel; Future; Expected date: 04/03/2018  - stop all herbal supplements and recheck LFT in 6 weeks   - if still elevated will  refer to hepatology     Other orders  -     alendronate (FOSAMAX) 70 MG tablet; Take 1 tablet (70 mg total) by mouth every 7 days.  Dispense: 12 tablet; Refill: 3

## 2018-04-03 NOTE — PATIENT INSTRUCTIONS
Start Fosamax 70 mg once weekly with a full glass of water and remain upright 30 minutes to one hour after taking the medication  Stop all herbal supplements and recheck LFT in 6 weeks if still elevated will refer to hepatology   When get blood work return to see nurse for a blood pressure check up bring machine and current readings  Recommend taking blood pressure medication every day   Get Shingles vaccine and tetanus at [facility]

## 2018-04-17 ENCOUNTER — TELEPHONE (OUTPATIENT)
Dept: FAMILY MEDICINE | Facility: CLINIC | Age: 63
End: 2018-04-17

## 2018-04-17 DIAGNOSIS — R79.89 ELEVATED LFTS: Primary | ICD-10-CM

## 2018-04-17 NOTE — TELEPHONE ENCOUNTER
----- Message from Tracey Fuentes sent at 4/16/2018 12:51 PM CDT -----  Patient would like you to change her lab location [facility] ON [address]. She would like the same day just new location. [phone]. Just leave a message b/c she's at work, shell call back if needed.

## 2018-04-24 ENCOUNTER — TELEPHONE (OUTPATIENT)
Dept: FAMILY MEDICINE | Facility: CLINIC | Age: 63
End: 2018-04-24

## 2018-04-24 NOTE — TELEPHONE ENCOUNTER
Patient stated she has been have involuntary muscle movement while taking the Alendronate. She stated she will try to continue to take the medication a little longer to see if the side effect change. Patient would like to discuss the side effects and resolutions to side effects with you please contact patient the best time is after 4:00.

## 2018-04-24 NOTE — TELEPHONE ENCOUNTER
----- Message from Mary Hamilton sent at 4/23/2018  4:18 PM CDT -----  Contact: [phone]/PT  Calling TO speak with Argenis regarding symptoms  to medication she was put on since appt.

## 2018-04-25 NOTE — TELEPHONE ENCOUNTER
I left a message for the patient to return a call to the office to discuss side effect of Fosamax.

## 2018-04-25 NOTE — TELEPHONE ENCOUNTER
I spoke with the patient and she said when she started the Fosamax she had some involuntary  muscle movements in upper back but were much less the third time she took it. She says she wants to try it a few more weeks and see if they resolve. She will let me know how she does next week. Patient verbalized understanding of above.

## 2018-05-01 ENCOUNTER — OFFICE VISIT (OUTPATIENT)
Dept: FAMILY MEDICINE | Facility: CLINIC | Age: 63
End: 2018-05-01
Payer: COMMERCIAL

## 2018-05-01 VITALS
DIASTOLIC BLOOD PRESSURE: 98 MMHG | WEIGHT: 93.94 LBS | BODY MASS INDEX: 16.64 KG/M2 | HEART RATE: 84 BPM | SYSTOLIC BLOOD PRESSURE: 170 MMHG | OXYGEN SATURATION: 99 % | TEMPERATURE: 98 F | HEIGHT: 63 IN

## 2018-05-01 DIAGNOSIS — L02.411 ABSCESS OF RIGHT AXILLA: Primary | ICD-10-CM

## 2018-05-01 DIAGNOSIS — I10 ESSENTIAL HYPERTENSION: ICD-10-CM

## 2018-05-01 PROCEDURE — 3080F DIAST BP >= 90 MM HG: CPT | Mod: CPTII,S$GLB,, | Performed by: NURSE PRACTITIONER

## 2018-05-01 PROCEDURE — 3077F SYST BP >= 140 MM HG: CPT | Mod: CPTII,S$GLB,, | Performed by: NURSE PRACTITIONER

## 2018-05-01 PROCEDURE — 99214 OFFICE O/P EST MOD 30 MIN: CPT | Mod: S$GLB,,, | Performed by: NURSE PRACTITIONER

## 2018-05-01 PROCEDURE — 99999 PR PBB SHADOW E&M-EST. PATIENT-LVL IV: CPT | Mod: PBBFAC,,, | Performed by: NURSE PRACTITIONER

## 2018-05-01 RX ORDER — SULFAMETHOXAZOLE AND TRIMETHOPRIM 800; 160 MG/1; MG/1
1 TABLET ORAL 2 TIMES DAILY
Qty: 20 TABLET | Refills: 0 | Status: SHIPPED | OUTPATIENT
Start: 2018-05-01 | End: 2018-05-11

## 2018-05-02 ENCOUNTER — TELEPHONE (OUTPATIENT)
Dept: FAMILY MEDICINE | Facility: CLINIC | Age: 63
End: 2018-05-02

## 2018-05-02 NOTE — TELEPHONE ENCOUNTER
I left a message for the patient to return a call to the office to discuss the medication that I prescribed.

## 2018-05-03 NOTE — TELEPHONE ENCOUNTER
I spoke with the patient yesterday evening and she was concerned about having her liver functions checked prior to starting the Bactrim. I explained to her that she did not need her LFT check to just take one course of Bactrim. Patient verbalized understanding of above.

## 2018-05-09 ENCOUNTER — TELEPHONE (OUTPATIENT)
Dept: FAMILY MEDICINE | Facility: CLINIC | Age: 63
End: 2018-05-09

## 2018-05-09 DIAGNOSIS — R79.89 ELEVATED LFTS: Primary | ICD-10-CM

## 2018-05-09 NOTE — TELEPHONE ENCOUNTER
Left a message for patient no call back was needed a voicemail was left notifying her the lab test was sent to "[facility] as requested.

## 2018-05-09 NOTE — TELEPHONE ENCOUNTER
----- Message from Hyun Walsh sent at 5/8/2018  4:25 PM CDT -----  Contact: self  Pt states she just got an appt reminder for labs at [facility]. She states she is not taking the labs at [facility] and wanted to verify that the lab orders have been sent to [facility].   Pt call back is [phone].

## 2018-05-09 NOTE — TELEPHONE ENCOUNTER
----- Message from Bailey Cunningham sent at 5/9/2018 12:03 PM CDT -----  Contact: Self  Returned [phone]. Pt states to please leave VM when returning call.

## 2018-05-17 ENCOUNTER — TELEPHONE (OUTPATIENT)
Dept: FAMILY MEDICINE | Facility: CLINIC | Age: 63
End: 2018-05-17

## 2018-05-17 LAB
ALBUMIN SERPL-MCNC: 4.3 G/DL (ref 3.6–5.1)
ALBUMIN/GLOB SERPL: 1.5 (CALC) (ref 1–2.5)
ALP SERPL-CCNC: 50 U/L (ref 33–130)
ALT SERPL-CCNC: 24 U/L (ref 6–29)
AST SERPL-CCNC: 23 U/L (ref 10–35)
BILIRUB DIRECT SERPL-MCNC: 0.1 MG/DL
BILIRUB INDIRECT SERPL-MCNC: 0.3 MG/DL (CALC) (ref 0.2–1.2)
BILIRUB SERPL-MCNC: 0.4 MG/DL (ref 0.2–1.2)
GLOBULIN SER CALC-MCNC: 2.8 G/DL (CALC) (ref 1.9–3.7)
PROT SERPL-MCNC: 7.1 G/DL (ref 6.1–8.1)

## 2018-05-17 NOTE — TELEPHONE ENCOUNTER
I left a message on patient's voice mail that her LFT tests were normal now. Patient verbalized understanding of above.

## 2018-05-17 NOTE — TELEPHONE ENCOUNTER
----- Message from Jaycob Levine sent at 5/17/2018  7:25 AM CDT -----  Contact: Self/[phone]  Patient returned the staff's call. Thank you.

## 2018-06-05 ENCOUNTER — TELEPHONE (OUTPATIENT)
Dept: FAMILY MEDICINE | Facility: CLINIC | Age: 63
End: 2018-06-05

## 2018-06-05 NOTE — TELEPHONE ENCOUNTER
----- Message from Jaycob Levine sent at 6/5/2018 12:29 PM CDT -----  Contact: Self/[phone]  Patient would like to speak to the staff about her upcoming appointment. She would like to know if she has to come in for a follow up appointment.  Thank you.

## 2018-06-06 NOTE — TELEPHONE ENCOUNTER
----- Message from Reynold Zepeda sent at 6/5/2018  4:48 PM CDT -----  Contact: Cathy [phone]  Pt is requesting a call back from Ms. Lerma in regards to an upcoming appointment. Please call at your earliest convenience.

## 2018-07-10 ENCOUNTER — OFFICE VISIT (OUTPATIENT)
Dept: FAMILY MEDICINE | Facility: CLINIC | Age: 63
End: 2018-07-10
Payer: COMMERCIAL

## 2018-07-10 VITALS
HEIGHT: 63 IN | SYSTOLIC BLOOD PRESSURE: 150 MMHG | TEMPERATURE: 99 F | DIASTOLIC BLOOD PRESSURE: 92 MMHG | WEIGHT: 96 LBS | HEART RATE: 74 BPM | BODY MASS INDEX: 17.01 KG/M2 | OXYGEN SATURATION: 99 %

## 2018-07-10 DIAGNOSIS — M81.0 OSTEOPOROSIS, POSTMENOPAUSAL: ICD-10-CM

## 2018-07-10 DIAGNOSIS — Z11.1 SCREENING-PULMONARY TB: ICD-10-CM

## 2018-07-10 DIAGNOSIS — J30.1 SEASONAL ALLERGIC RHINITIS DUE TO POLLEN: ICD-10-CM

## 2018-07-10 DIAGNOSIS — E55.9 VITAMIN D DEFICIENCY: ICD-10-CM

## 2018-07-10 DIAGNOSIS — I10 ESSENTIAL HYPERTENSION: Primary | ICD-10-CM

## 2018-07-10 PROCEDURE — 86580 TB INTRADERMAL TEST: CPT | Mod: S$GLB,,, | Performed by: NURSE PRACTITIONER

## 2018-07-10 PROCEDURE — 3080F DIAST BP >= 90 MM HG: CPT | Mod: CPTII,S$GLB,, | Performed by: NURSE PRACTITIONER

## 2018-07-10 PROCEDURE — 99214 OFFICE O/P EST MOD 30 MIN: CPT | Mod: S$GLB,,, | Performed by: NURSE PRACTITIONER

## 2018-07-10 PROCEDURE — 99999 PR PBB SHADOW E&M-EST. PATIENT-LVL III: CPT | Mod: PBBFAC,,, | Performed by: NURSE PRACTITIONER

## 2018-07-10 PROCEDURE — 3077F SYST BP >= 140 MM HG: CPT | Mod: CPTII,S$GLB,, | Performed by: NURSE PRACTITIONER

## 2018-07-10 PROCEDURE — 3008F BODY MASS INDEX DOCD: CPT | Mod: CPTII,S$GLB,, | Performed by: NURSE PRACTITIONER

## 2018-07-10 RX ORDER — ALENDRONATE SODIUM 70 MG/1
70 TABLET ORAL
Refills: 3 | COMMUNITY
Start: 2018-06-29 | End: 2019-03-04 | Stop reason: SDUPTHER

## 2018-07-10 NOTE — PATIENT INSTRUCTIONS
Continue all current medications   TB skin test today   TB skin test reading and blood pressure check up on Thursday if still elevated will increase Norvasc to 10 mg daily  Restart Flonase and Claritin daily   Follow up with Dr. Elizondo in 11/2018

## 2018-07-10 NOTE — PROGRESS NOTES
Subjective:       Patient ID: Cathy Reynolds is a 63 y.o. female.    Chief Complaint: Hypertension (F/U)    63-year-old female with presents to the clinic today follow-up on hypertension.  Her blood pressure today is 150/92.  She states the last time she took her blood pressure at home it was 125/77.  She denies any headaches, dizziness, or blurred vision.  She denies any cardiac chest pain, heart palpitations, shortness breath, or swelling to lower extremities.  She does have some mild sinus congestion with some right ear discomfort.  She denies any fever, chills, sore throat, coughing, wheezing, shortness of breath, abdominal pain, nausea, vomiting, or diarrhea.  She is currently not taking her Flonase or Claritin.  She is due for TB skin test for work.  I wiill have her do her TB skin test today.  I will have the nurse recheck her blood pressure on Thursday and if is still elevated will increase her Norvasc to 10 mg. She is tolerating the Fosamax fine as long as she waits 1 hour before eating anything.       Past Medical History:   Diagnosis Date    AR (allergic rhinitis)     Diverticulosis     Elevated LFTs     borderline - due to OTC herbals - resolved    History of colon polyps     Hyperlipidemia     borderline with high HDL    Hypertension     Mild anxiety     Osteoporosis, postmenopausal     Postmenopausal status     Underweight     Vitamin D deficiency      Past Surgical History:   Procedure Laterality Date     SECTION, LOW TRANSVERSE      COLONOSCOPY      MYOMECTOMY      polyp removal from cervix        reports that she has never smoked. She has never used smokeless tobacco. She reports that she does not drink alcohol or use drugs.  Review of Systems   Constitutional: Negative for chills and fever.   HENT: Positive for congestion and ear pain. Negative for ear discharge, postnasal drip, rhinorrhea, sinus pressure, sneezing and sore throat.    Eyes: Negative for pain, discharge  and itching.   Respiratory: Negative for cough, shortness of breath and wheezing.    Cardiovascular: Negative for chest pain, palpitations and leg swelling.   Gastrointestinal: Negative for abdominal pain, diarrhea, nausea and vomiting.   Musculoskeletal: Negative for back pain, neck pain and neck stiffness.   Skin: Negative for rash.   Neurological: Negative for dizziness, light-headedness and headaches.   Hematological: Negative for adenopathy.       Objective:      Physical Exam   Constitutional: She is oriented to person, place, and time. She appears well-developed and well-nourished. No distress.   HENT:   Head: Normocephalic and atraumatic.   Right Ear: External ear normal.   Left Ear: External ear normal.   Mouth/Throat: Oropharynx is clear and moist. No oropharyngeal exudate.   Pale mucus membranes clear nasal drainage    Eyes: Conjunctivae and EOM are normal. Pupils are equal, round, and reactive to light. Right eye exhibits no discharge. Left eye exhibits no discharge. No scleral icterus.   Neck: Normal range of motion. Neck supple.   Cardiovascular: Normal rate and regular rhythm.  Exam reveals no gallop and no friction rub.    No murmur heard.  Pulmonary/Chest: Effort normal and breath sounds normal. No respiratory distress. She has no wheezes. She has no rales.   Abdominal: Soft. Bowel sounds are normal. There is no tenderness.   Musculoskeletal: Normal range of motion. She exhibits no edema.   Lymphadenopathy:     She has no cervical adenopathy.   Neurological: She is alert and oriented to person, place, and time.   Skin: Skin is warm and dry. No rash noted. She is not diaphoretic.   Psychiatric: She has a normal mood and affect.       Assessment:       1. Essential hypertension    2. Osteoporosis, postmenopausal    3. Vitamin D deficiency    4. Seasonal allergic rhinitis due to pollen    5. Screening-pulmonary TB        Plan:         Essential hypertension  - return on Thursday and have a blood  pressure check up will increase Amlodipine if still elevted    Osteoporosis, postmenopausal  - continue Fosamax  - refused Prolia injections as recommended per Dr. Rockwell    Vitamin D deficiency  - The current medical regimen is effective;  continue present plan and medications.    Seasonal allergic rhinitis due to pollen  - restart Flonase and Claritin    Screening-pulmonary TB  -     POCT TB Skin Test Read    Refuses pneumonia vaccines

## 2018-07-12 ENCOUNTER — CLINICAL SUPPORT (OUTPATIENT)
Dept: FAMILY MEDICINE | Facility: CLINIC | Age: 63
End: 2018-07-12
Payer: COMMERCIAL

## 2018-07-12 VITALS — SYSTOLIC BLOOD PRESSURE: 136 MMHG | DIASTOLIC BLOOD PRESSURE: 80 MMHG | HEART RATE: 91 BPM

## 2018-07-12 DIAGNOSIS — I10 ESSENTIAL HYPERTENSION: Primary | ICD-10-CM

## 2018-07-12 LAB
TB INDURATION - 48 HR READ: NORMAL MM
TB INDURATION - 72 HR READ: NORMAL MM
TB SKIN TEST - 48 HR READ: NORMAL
TB SKIN TEST - 72 HR READ: NORMAL

## 2018-07-12 PROCEDURE — 99499 UNLISTED E&M SERVICE: CPT | Mod: S$GLB,,, | Performed by: INTERNAL MEDICINE

## 2018-07-12 PROCEDURE — 99999 PR PBB SHADOW E&M-EST. PATIENT-LVL I: CPT | Mod: PBBFAC,,,

## 2018-07-12 NOTE — PROGRESS NOTES
Cathy Reynolds 63 y.o. female is here today for Blood Pressure check.   History of HTN yes.    Review of patient's allergies indicates:   Allergen Reactions    Ace inhibitors      Other reaction(s): cough    Penicillins Hives     Creatinine   Date Value Ref Range Status   02/17/2018 0.7 0.5 - 1.4 mg/dL Final     Sodium   Date Value Ref Range Status   02/17/2018 141 136 - 145 mmol/L Final     Potassium   Date Value Ref Range Status   02/17/2018 4.9 3.5 - 5.1 mmol/L Final   ]  Patient verifies taking blood pressure medications on a regular basis at the same time of the day.     Current Outpatient Prescriptions:     alendronate (FOSAMAX) 70 MG tablet, Take 70 mg by mouth every 7 days. , Disp: , Rfl: 3    amLODIPine (NORVASC) 5 MG tablet, TAKE 1 TABLET(5 MG) BY MOUTH EVERY DAY, Disp: 90 tablet, Rfl: 1    cholecalciferol, vitamin D3, (VITAMIN D3) 2,000 unit Cap, Take 2 capsules by mouth once daily. , Disp: , Rfl:     fluticasone (FLONASE) 50 mcg/actuation nasal spray, SPRAY TWICE IN EACH NOSTRIL ONCE DAILY, Disp: 16 g, Rfl: 4    loratadine (CLARITIN) 10 mg tablet, Take 1 tablet (10 mg total) by mouth once daily., Disp: 30 tablet, Rfl: 4    triamcinolone acetonide 0.5% (KENALOG) 0.5 % Crea, Apply topically 2 (two) times daily., Disp: 30 g, Rfl: 2  Does patient have record of home blood pressure readings no. .   Last dose of blood pressure medication was taken at 7:00 this morning..  Patient is asymptomatic.   Complains of none.    Vitals:    07/12/18 1307   BP: 136/80   BP Location: Right arm   Patient Position: Sitting   BP Method: Small (Manual)   Pulse: 91     Patient was anxious during visit,however she calmed down with breathing.    Dr. Elizondo informed of nurse visit.

## 2018-08-03 ENCOUNTER — OFFICE VISIT (OUTPATIENT)
Dept: FAMILY MEDICINE | Facility: CLINIC | Age: 63
End: 2018-08-03
Payer: COMMERCIAL

## 2018-08-03 VITALS
HEIGHT: 63 IN | HEART RATE: 79 BPM | SYSTOLIC BLOOD PRESSURE: 144 MMHG | WEIGHT: 95.69 LBS | TEMPERATURE: 98 F | DIASTOLIC BLOOD PRESSURE: 84 MMHG | BODY MASS INDEX: 16.95 KG/M2

## 2018-08-03 DIAGNOSIS — J30.89 ENVIRONMENTAL AND SEASONAL ALLERGIES: ICD-10-CM

## 2018-08-03 DIAGNOSIS — L02.92 FURUNCULOSIS: ICD-10-CM

## 2018-08-03 DIAGNOSIS — I10 ESSENTIAL HYPERTENSION: ICD-10-CM

## 2018-08-03 DIAGNOSIS — F41.9 MILD ANXIETY: ICD-10-CM

## 2018-08-03 DIAGNOSIS — H93.11 TINNITUS OF RIGHT EAR: Primary | ICD-10-CM

## 2018-08-03 DIAGNOSIS — H91.91 HEARING LOSS OF RIGHT EAR, UNSPECIFIED HEARING LOSS TYPE: ICD-10-CM

## 2018-08-03 DIAGNOSIS — Z78.0 POSTMENOPAUSAL STATUS: ICD-10-CM

## 2018-08-03 PROCEDURE — 3079F DIAST BP 80-89 MM HG: CPT | Mod: CPTII,S$GLB,, | Performed by: INTERNAL MEDICINE

## 2018-08-03 PROCEDURE — 3077F SYST BP >= 140 MM HG: CPT | Mod: CPTII,S$GLB,, | Performed by: INTERNAL MEDICINE

## 2018-08-03 PROCEDURE — 99214 OFFICE O/P EST MOD 30 MIN: CPT | Mod: S$GLB,,, | Performed by: INTERNAL MEDICINE

## 2018-08-03 PROCEDURE — 99999 PR PBB SHADOW E&M-EST. PATIENT-LVL III: CPT | Mod: PBBFAC,,, | Performed by: INTERNAL MEDICINE

## 2018-08-03 PROCEDURE — 3008F BODY MASS INDEX DOCD: CPT | Mod: CPTII,S$GLB,, | Performed by: INTERNAL MEDICINE

## 2018-08-03 NOTE — PROGRESS NOTES
Subjective:        Patient ID: Cathy Reynolds is a 63 y.o. female.    Chief Complaint: Cyst (under right arm); Tinnitus (right ear); and Allergies    HPI   Cathy Reynolds presents with the followin. Allergies, ringing in the ear: Pt has chronic allergies all year.  She takes claritin and uses flonase daily.  She still has nasal sinus congestion, clogged ears and irritation in the throat.  Sx are worse in the R side x 2.5 weeks.  She also endorses chronic ringing and hearing loss in the right ear.  Triggers for allergies include car fumes and the smell of cleaning products.    2. Bump in underarm: Pt gets frequent recurrent boils in her right axilla.  It doesn't seem to occur in the L.  She has tried different antiperspirants.  Usually she applies a warm compress and they improve.  She has one in the R axilla now that is better than it was before.    3. Anxiety, nervous, postmenopausal: Pt reports hx of anxiety (nervous about falling - no hx of falls, social situations) and feeling nervous in general.  She says her doctor told her that's just her personality and pt also thinks it's related to hormone changes after menopause.  She gets occasional hot flashes.    Review of Systems  as per HPI      Objective:        Vitals:    18 1502   BP: (!) 144/84   Pulse:    Temp:      Physical Exam   Constitutional: She is oriented to person, place, and time. She appears well-developed and well-nourished. No distress.   HENT:   Head: Normocephalic and atraumatic.   Right Ear: External ear normal.   Left Ear: External ear normal.   Nose: Nose normal.   Mouth/Throat: Oropharynx is clear and moist. No oropharyngeal exudate.   - bilateral ear canals clear, tympanic membranes visualized - normal color and light reflex  - no middle ear effusions  - clear postnasal drip   Eyes: Conjunctivae and EOM are normal.   Neck: Normal range of motion. Neck supple.   Lymphadenopathy:     She has no cervical adenopathy.    Neurological: She is alert and oriented to person, place, and time.   Skin:   R axilla: 1cm mildly erythematous nodule, no fluctuance   Vitals reviewed.          Assessment:         1. Tinnitus of right ear    2. Hearing loss of right ear, unspecified hearing loss type    3. Environmental and seasonal allergies    4. Furunculosis    5. Mild anxiety    6. Essential hypertension    7. Postmenopausal status              Plan:         Cathy was seen today for cyst, tinnitus and allergies.    Diagnoses and all orders for this visit:    Tinnitus of right ear  Hearing loss of right ear, unspecified hearing loss type: Counseled pt that hearing loss and allergies/congestion are common causes of tinnitus.  Recommend ENT evaluation to r/o other causes, treat hearing loss if indicated.    Environmental and seasonal allergies: Continue Claritin and Flonase daily.  Also suggested adding Singulair daily, using nasal saline rinse before using Flonase.  Pt very hesitant to try any new medication, concerned about SEs.  Advised pt she can think about it and let me know if she would like a Rx for Singulair later.    Furunculosis: Current lesion improving.  Continue applying warm compresses; do this first when new lesions appear.  If they do not drain spontaneously or continue worsening, follow up for possible abx, I&D.  - try using deodorant instead of antiperspirant  - use wash cloth when bathing to exfoliate and clear dead skin cells that can clog follicles    Mild anxiety: Pt not interested in taking anything for this at this time.    Essential hypertension: bp improved when rechecked.  Pt reports home bp this /70s and that it's always higher when she goes to the doctor.  Follow up with PCP.    Postmenopausal status: Pt is going to try OTC estroven to see if this helps with anxiety and mood.  Pt not interested in prescription medication for menopausal sx.  Follow up with PCP or gyn if sx change or get worse.

## 2018-09-26 ENCOUNTER — TELEPHONE (OUTPATIENT)
Dept: FAMILY MEDICINE | Facility: CLINIC | Age: 63
End: 2018-09-26

## 2018-09-26 NOTE — TELEPHONE ENCOUNTER
----- Message from Jaycob Levine sent at 9/26/2018  1:48 PM CDT -----  Contact: Self/[phone]  Patient would like to speak to the staff about a medication regarding her bones. She stated that the Pharmacy does not have the medication in stock. Thank you.

## 2018-09-27 ENCOUNTER — TELEPHONE (OUTPATIENT)
Dept: FAMILY MEDICINE | Facility: CLINIC | Age: 63
End: 2018-09-27

## 2018-09-27 NOTE — TELEPHONE ENCOUNTER
----- Message from Tracey Fuentes sent at 9/27/2018  4:11 PM CDT -----  Contact: Self   Patient is asking tos peak with the office in ref to a medication that is out of stock at all the [facility]. She is asking for an alternative.  [phone].    alendronate (FOSAMAX) 70 MG tablet         [facility] [address] [place] [address] AT [facility]

## 2018-09-27 NOTE — TELEPHONE ENCOUNTER
----- Message from Hyun faywhit sent at 9/26/2018  4:16 PM CDT -----  Contact: self - [phone]  Pt returned staff's call. Please contact back at earliest convenience.

## 2018-09-27 NOTE — TELEPHONE ENCOUNTER
Spoke with pt states rx is back ordered. Advised pt that we can send rx to a different pharmacy. Pt states she will give our office a call to advise which pharmacy.

## 2018-09-28 NOTE — TELEPHONE ENCOUNTER
----- Message from Soha Hansen sent at 9/28/2018  3:54 PM CDT -----  Contact: Self/ [phone]  Pt calling to notify office she was able to find Rx for FOSAMAX at [facility] on [facility] in [facility]. Thank you.

## 2018-10-15 DIAGNOSIS — J30.1 ACUTE SEASONAL ALLERGIC RHINITIS DUE TO POLLEN: ICD-10-CM

## 2018-10-15 RX ORDER — LORATADINE 10 MG/1
TABLET ORAL
Qty: 30 TABLET | Refills: 0 | Status: SHIPPED | OUTPATIENT
Start: 2018-10-15 | End: 2019-03-04

## 2018-10-25 ENCOUNTER — CLINICAL SUPPORT (OUTPATIENT)
Dept: OTOLARYNGOLOGY | Facility: CLINIC | Age: 63
End: 2018-10-25
Payer: COMMERCIAL

## 2018-10-25 ENCOUNTER — LAB VISIT (OUTPATIENT)
Dept: LAB | Facility: OTHER | Age: 63
End: 2018-10-25
Payer: COMMERCIAL

## 2018-10-25 ENCOUNTER — TELEPHONE (OUTPATIENT)
Dept: FAMILY MEDICINE | Facility: CLINIC | Age: 63
End: 2018-10-25

## 2018-10-25 ENCOUNTER — OFFICE VISIT (OUTPATIENT)
Dept: OTOLARYNGOLOGY | Facility: CLINIC | Age: 63
End: 2018-10-25
Payer: COMMERCIAL

## 2018-10-25 VITALS
SYSTOLIC BLOOD PRESSURE: 141 MMHG | HEART RATE: 98 BPM | DIASTOLIC BLOOD PRESSURE: 87 MMHG | HEIGHT: 63 IN | WEIGHT: 96 LBS | BODY MASS INDEX: 17.01 KG/M2

## 2018-10-25 DIAGNOSIS — H93.11 TINNITUS OF RIGHT EAR: ICD-10-CM

## 2018-10-25 DIAGNOSIS — Z77.122 HISTORY OF EXPOSURE TO NOISE: ICD-10-CM

## 2018-10-25 DIAGNOSIS — H90.3 ASYMMETRICAL SENSORINEURAL HEARING LOSS: ICD-10-CM

## 2018-10-25 DIAGNOSIS — H93.19 TINNITUS, UNSPECIFIED LATERALITY: ICD-10-CM

## 2018-10-25 DIAGNOSIS — I10 ESSENTIAL HYPERTENSION: ICD-10-CM

## 2018-10-25 DIAGNOSIS — R29.2 ABNORMAL ACOUSTIC REFLEX: ICD-10-CM

## 2018-10-25 DIAGNOSIS — H90.3 ASYMMETRICAL SENSORINEURAL HEARING LOSS: Primary | ICD-10-CM

## 2018-10-25 LAB
CREAT SERPL-MCNC: 0.7 MG/DL
EST. GFR  (AFRICAN AMERICAN): >60 ML/MIN/1.73 M^2
EST. GFR  (NON AFRICAN AMERICAN): >60 ML/MIN/1.73 M^2

## 2018-10-25 PROCEDURE — 3079F DIAST BP 80-89 MM HG: CPT | Mod: CPTII,S$GLB,, | Performed by: OTOLARYNGOLOGY

## 2018-10-25 PROCEDURE — 92550 TYMPANOMETRY & REFLEX THRESH: CPT | Mod: S$GLB,,, | Performed by: AUDIOLOGIST-HEARING AID FITTER

## 2018-10-25 PROCEDURE — 82565 ASSAY OF CREATININE: CPT

## 2018-10-25 PROCEDURE — 92550 PR TYMPANOMETRY AND REFLEX THRESHOLD MEASUREMENTS: ICD-10-PCS | Mod: S$GLB,,, | Performed by: AUDIOLOGIST-HEARING AID FITTER

## 2018-10-25 PROCEDURE — 99204 OFFICE O/P NEW MOD 45 MIN: CPT | Mod: S$GLB,,, | Performed by: OTOLARYNGOLOGY

## 2018-10-25 PROCEDURE — 92557 PR COMPREHENSIVE HEARING TEST: ICD-10-PCS | Mod: S$GLB,,, | Performed by: AUDIOLOGIST-HEARING AID FITTER

## 2018-10-25 PROCEDURE — 3008F BODY MASS INDEX DOCD: CPT | Mod: CPTII,S$GLB,, | Performed by: OTOLARYNGOLOGY

## 2018-10-25 PROCEDURE — 92557 COMPREHENSIVE HEARING TEST: CPT | Mod: S$GLB,,, | Performed by: AUDIOLOGIST-HEARING AID FITTER

## 2018-10-25 PROCEDURE — 36415 COLL VENOUS BLD VENIPUNCTURE: CPT

## 2018-10-25 PROCEDURE — 3077F SYST BP >= 140 MM HG: CPT | Mod: CPTII,S$GLB,, | Performed by: OTOLARYNGOLOGY

## 2018-10-25 PROCEDURE — 1101F PT FALLS ASSESS-DOCD LE1/YR: CPT | Mod: CPTII,S$GLB,, | Performed by: OTOLARYNGOLOGY

## 2018-10-25 NOTE — TELEPHONE ENCOUNTER
----- Message from Masha Elizondo MD sent at 10/25/2018 11:32 AM CDT -----  Please call patient for free blood pressure check with nursing staff.

## 2018-11-13 ENCOUNTER — TELEPHONE (OUTPATIENT)
Dept: OTOLARYNGOLOGY | Facility: CLINIC | Age: 63
End: 2018-11-13

## 2018-11-13 NOTE — TELEPHONE ENCOUNTER
LM on VM to call office.    ----- Message from Shelly Henriquez sent at 11/13/2018  1:36 PM CST -----  Contact: Pt   Name of Who is Calling: GAYLE VIDES [[id number]]    What is the request in detail: Pt want to know can she can have her order for her MRI sent to [facility]?  # [phone]. Please advise.     Can the clinic reply by [facility]: No     What Number to Call Back if not in [facility]: [phone]

## 2018-11-14 ENCOUNTER — TELEPHONE (OUTPATIENT)
Dept: OTOLARYNGOLOGY | Facility: CLINIC | Age: 63
End: 2018-11-14

## 2018-11-14 NOTE — TELEPHONE ENCOUNTER
LM to call office regarding orders sent to [facility]    ----- Message from Sugey Medeiros sent at 11/14/2018  3:45 PM CST -----  Contact: pt   Name of Who is Calling: GAYLE VIDES [[id number]]  What is the request in detail: Patient is requesting a call back in regards to orders being sent over to a different office....Please contact to further discuss and advise      Can the clinic reply by [facility]:No     [place] Back [place] [facility]: [phone].please leave message...

## 2018-11-15 ENCOUNTER — TELEPHONE (OUTPATIENT)
Dept: OTOLARYNGOLOGY | Facility: CLINIC | Age: 63
End: 2018-11-15

## 2018-11-15 NOTE — TELEPHONE ENCOUNTER
LM on VM to call office. Orders sent to [facility] per pt request      ----- Message from Norma Lindquist sent at 11/15/2018 10:11 AM CST -----  Contact: quique  Name of Who is Calling: quique      What is the request in detail: Patient was returning a missed call back from the staff       Can the clinic reply by [facility]: no      What Number to Call Back if not in [facility]: [phone]

## 2018-11-15 NOTE — TELEPHONE ENCOUNTER
LM on VM to call office. Re: orders sent to [facility]    ----- Message from Cheryl Karimi sent at 11/14/2018  5:16 PM CST -----  Contact: Pt   Pt missed a phone call from the nurse and would like a call back at your earliest convenience       Pt can be contacted at [phone]

## 2018-11-21 ENCOUNTER — OFFICE VISIT (OUTPATIENT)
Dept: FAMILY MEDICINE | Facility: CLINIC | Age: 63
End: 2018-11-21
Payer: COMMERCIAL

## 2018-11-21 VITALS
BODY MASS INDEX: 17.07 KG/M2 | WEIGHT: 96.31 LBS | SYSTOLIC BLOOD PRESSURE: 134 MMHG | HEIGHT: 63 IN | DIASTOLIC BLOOD PRESSURE: 84 MMHG | TEMPERATURE: 98 F | HEART RATE: 78 BPM | OXYGEN SATURATION: 99 %

## 2018-11-21 DIAGNOSIS — M81.0 OSTEOPOROSIS, POSTMENOPAUSAL: ICD-10-CM

## 2018-11-21 DIAGNOSIS — J30.1 SEASONAL ALLERGIC RHINITIS DUE TO POLLEN: ICD-10-CM

## 2018-11-21 DIAGNOSIS — E55.9 VITAMIN D DEFICIENCY: Primary | ICD-10-CM

## 2018-11-21 DIAGNOSIS — I10 ESSENTIAL HYPERTENSION: ICD-10-CM

## 2018-11-21 DIAGNOSIS — F41.9 MILD ANXIETY: ICD-10-CM

## 2018-11-21 DIAGNOSIS — Z23 NEED FOR INFLUENZA VACCINATION: ICD-10-CM

## 2018-11-21 PROCEDURE — 3008F BODY MASS INDEX DOCD: CPT | Mod: CPTII,S$GLB,, | Performed by: NURSE PRACTITIONER

## 2018-11-21 PROCEDURE — 90662 IIV NO PRSV INCREASED AG IM: CPT | Mod: S$GLB,,, | Performed by: NURSE PRACTITIONER

## 2018-11-21 PROCEDURE — 99214 OFFICE O/P EST MOD 30 MIN: CPT | Mod: 25,S$GLB,, | Performed by: NURSE PRACTITIONER

## 2018-11-21 PROCEDURE — 99999 PR PBB SHADOW E&M-EST. PATIENT-LVL IV: CPT | Mod: PBBFAC,,, | Performed by: NURSE PRACTITIONER

## 2018-11-21 PROCEDURE — 1101F PT FALLS ASSESS-DOCD LE1/YR: CPT | Mod: CPTII,S$GLB,, | Performed by: NURSE PRACTITIONER

## 2018-11-21 PROCEDURE — 3075F SYST BP GE 130 - 139MM HG: CPT | Mod: CPTII,S$GLB,, | Performed by: NURSE PRACTITIONER

## 2018-11-21 PROCEDURE — 3079F DIAST BP 80-89 MM HG: CPT | Mod: CPTII,S$GLB,, | Performed by: NURSE PRACTITIONER

## 2018-11-21 PROCEDURE — 90471 IMMUNIZATION ADMIN: CPT | Mod: S$GLB,,, | Performed by: NURSE PRACTITIONER

## 2018-11-21 RX ORDER — OLOPATADINE HYDROCHLORIDE 1 MG/ML
SOLUTION/ DROPS OPHTHALMIC
Refills: 4 | COMMUNITY
Start: 2018-11-15 | End: 2022-04-18

## 2018-11-21 NOTE — PROGRESS NOTES
Subjective:       Patient ID: Cathy Reynolds is a 63 y.o. female.    Chief Complaint: Follow-up    63-year-old female presents to the clinic today for hypertension follow-up.  Her repeat blood pressure is 134/84.  She states her home blood pressures run 125-130 over 80-85.  She states she gets nervous when she comes to the doctor's office.  She denies any headaches, dizziness, or blurred vision.  She denies any cardiac chest pain, heart palpitations, shortness breath, or swelling to lower extremities.  She has good dietary habits.  She does yoga several times a week and is very active.  She stopped taking her antihistamine.  She does not always take her Norvasc it depends on what her blood pressure is.  She is going to have her flu shot today.  She is due for annual physical and 2019.       Past Medical History:   Diagnosis Date    AR (allergic rhinitis)     Diverticulosis     Ear pain, right     Elevated LFTs     borderline - due to OTC herbals - resolved    History of colon polyps     Hyperlipidemia     borderline with high HDL    Hypertension     Mild anxiety     Osteoporosis, postmenopausal     Postmenopausal status     Ringing in ear, right     Underweight     Vitamin D deficiency      Past Surgical History:   Procedure Laterality Date     SECTION, LOW TRANSVERSE      COLONOSCOPY      COLONOSCOPY N/A 2015    Performed by Fausto Shirley MD at [facility] ([address] [place] [address])    MYOMECTOMY      polyp removal from cervix        reports that  has never smoked. she has never used smokeless tobacco. She reports that she does not drink alcohol or use drugs.  Review of Systems   Constitutional: Negative for activity change.   Respiratory: Negative for cough, chest tightness, shortness of breath and wheezing.    Cardiovascular: Negative for chest pain, palpitations and leg swelling.   Gastrointestinal: Negative for abdominal pain, constipation, diarrhea, nausea and vomiting.    Musculoskeletal: Negative for gait problem.   Skin: Negative for color change.   Neurological: Negative for dizziness, syncope and light-headedness.       Objective:      Physical Exam   Constitutional: She is oriented to person, place, and time. No distress.   Thin pleasant female in NAD    Eyes: Conjunctivae and EOM are normal. Pupils are equal, round, and reactive to light. Right eye exhibits no discharge. Left eye exhibits no discharge. No scleral icterus.   Neck: Normal range of motion. Neck supple. No JVD present.   Cardiovascular: Normal rate, regular rhythm and normal heart sounds.   No murmur heard.  Pulmonary/Chest: Effort normal and breath sounds normal. No respiratory distress. She has no wheezes. She has no rales.   Abdominal: Soft. Bowel sounds are normal. There is no tenderness.   Musculoskeletal: Normal range of motion. She exhibits no edema.   Neurological: She is alert and oriented to person, place, and time.   Skin: Skin is warm and dry. She is not diaphoretic.   Psychiatric: She has a normal mood and affect.       Assessment:       1. Vitamin D deficiency    2. Need for influenza vaccination    3. Need for pneumococcal vaccination    4. Osteoporosis, postmenopausal    5. Mild anxiety    6. Essential hypertension    7. Seasonal allergic rhinitis due to pollen        Plan:         Vitamin D deficiency  - The current medical regimen is effective;  continue present plan and medications.    Need for influenza vaccination  -     Influenza - High Dose (65+) (PF) (IM)    Osteoporosis, postmenopausal  - The current medical regimen is effective;  continue present plan and medications.    Mild anxiety  - controlled without any medications     Essential hypertension  - does not always take her Norvasc    Seasonal allergic rhinitis due to pollen  - quit taking her Claritin daily    Other orders  -     Cancel: Pneumococcal Conjugate Vaccine (13 Valent) (IM)          No Follow-up on file.

## 2018-11-27 ENCOUNTER — TELEPHONE (OUTPATIENT)
Dept: OTOLARYNGOLOGY | Facility: CLINIC | Age: 63
End: 2018-11-27

## 2018-11-27 NOTE — TELEPHONE ENCOUNTER
----- Message from Norma Lindquist sent at 11/27/2018 11:31 AM CST -----  Contact:   Name of Who is Calling:      What is the request in detail:  is requesting a call back to discuss this patient care       Can the clinic reply by [facility]: no      What [facility] [phone]

## 2018-12-03 ENCOUNTER — TELEPHONE (OUTPATIENT)
Dept: OTOLARYNGOLOGY | Facility: CLINIC | Age: 63
End: 2018-12-03

## 2018-12-03 NOTE — PROGRESS NOTES
Subjective:       Patient ID: Cathy Reynolds is a 63 y.o. female.    Chief Complaint: Tinnitus (right)    She is a new patient for me here today complaining of ringing in the right ear as of August 2018.  She wonders if it is related to a sinus drip.  She takes Flonase and Claritin as needed and works well for any nasal symptoms.  She denies ongoing nasal congestion or sneezing or rhinorrhea etc.  She believes onset of tinnitus was gradual with no antecedent noise exposure or air travel or URI or nasal complaints.  There is history of exposure to loud music at a concert about a year ago and history of barotrauma within the past year or two.  No associated headache or spinning of fluctuations in hearing.  Denies increased caffeine or NSAID use.  Positive history of essential hypertension with continued blood pressure elevations requiring medication adjustments.  No left ear complaints.  No family history of otologic disease.  Denies childhood ear problems.  May have had an ear infection in 2005.          Review of Systems   Ears: Positive for ear pain and ringing in ear.  Negative for hearing loss, ear pressure, ear discharge, ear infections, dizziness, head trauma, taken gentramycin/streptomycin and family history of hearing loss.    Nose:  Negative for nosebleeds, nasal obstruction, nasal or sinus surgery, loss of smell and snoring.    Mouth/Throat: Negative for pain swallowing, impaired swallowing, hoarse voice, throat mass, neck mass, oral ulcers and neck lumps.   Constitutional: Negative for recent unexplained weight loss and fever.    Eyes:  Negative for history of glaucoma and visual change.   Cardiovascular:  Positive for history of high blood pressure. Negative for chest pain and palpitations.   Respiratory:  Negative for asthma, emphysema, history of tuberculosis, recent cough and shortness of breath.    Gastrointestinal:  Negative for history of stomach ulcers or pain, blood in stool and change in  stool.   Other:  Positive for anxiety. Negative for kidney problem, bladder problem, arthritis, new or changing moles, weakness, disturbances in coordination, slurred, swollen glands, anemia and persistent infections.           Objective:        Vitals:    10/25/18 1354   BP: (!) 141/87   Pulse: 98     Body mass index is 17.01 kg/m².  Physical Exam   Constitutional: She is oriented to person, place, and time. She appears well-developed and well-nourished. No distress.   HENT:   Head: Normocephalic and atraumatic.   Right Ear: Tympanic membrane, external ear and ear canal normal.   Left Ear: Tympanic membrane, external ear and ear canal normal.   Nose: No mucosal edema, rhinorrhea or nasal deformity. No epistaxis.   Mouth/Throat: Uvula is midline, oropharynx is clear and moist and mucous membranes are normal. No oral lesions. No trismus in the jaw. No uvula swelling. No oropharyngeal exudate, posterior oropharyngeal edema or posterior oropharyngeal erythema.   Neck: Normal range of motion and phonation normal. Neck supple. No tracheal deviation present. No thyromegaly present.   Pulmonary/Chest: Effort normal. No respiratory distress.   Lymphadenopathy:     She has no cervical adenopathy.   Neurological: She is alert and oriented to person, place, and time. She displays no weakness.   Skin: Skin is warm and dry. She is not diaphoretic.   Psychiatric: She has a normal mood and affect. Her behavior is normal. Her speech is not slurred.       Tests / Results:                Assessment:       1. Asymmetrical sensorineural hearing loss    2. Tinnitus of right ear    3. History of exposure to noise    4. Essential hypertension        Plan:        Reviewed above audiogram with patient and differential diagnoses and recommendations and answered questions.  Will proceed with MRI of brain and IAC's without and with contrast.  Creatinine level prior to MRI.  Follow-up after above, approximately 2 weeks.  Monitor and follow-up  blood pressure with PCP.

## 2018-12-03 NOTE — TELEPHONE ENCOUNTER
----- Message from Eulalia Cid MA sent at 11/29/2018  8:51 AM CST -----  Contact:       ----- Message -----  From: Nazia Ramírez MA  Sent: 11/27/2018   3:16 PM  To: Eulalia Cid MA        ----- Message -----  From: Norma Lindquist  Sent: 11/27/2018  11:31 AM  To: Edourad Sanon Staff    Name of Who is Calling:      What is the request in detail:  is requesting a call back to discuss this patient care       Can the clinic reply by [facility]: no      What [facility] to [facility] Back [facility] [phone]

## 2018-12-12 ENCOUNTER — OFFICE VISIT (OUTPATIENT)
Dept: OTOLARYNGOLOGY | Facility: CLINIC | Age: 63
End: 2018-12-12
Payer: COMMERCIAL

## 2018-12-12 VITALS
HEART RATE: 99 BPM | BODY MASS INDEX: 17.45 KG/M2 | SYSTOLIC BLOOD PRESSURE: 147 MMHG | TEMPERATURE: 99 F | DIASTOLIC BLOOD PRESSURE: 87 MMHG | WEIGHT: 98.5 LBS | HEIGHT: 63 IN

## 2018-12-12 DIAGNOSIS — R90.89 ABNORMAL FINDING ON MRI OF BRAIN: ICD-10-CM

## 2018-12-12 DIAGNOSIS — H90.3 ASYMMETRICAL SENSORINEURAL HEARING LOSS: ICD-10-CM

## 2018-12-12 DIAGNOSIS — H93.11 TINNITUS OF RIGHT EAR: ICD-10-CM

## 2018-12-12 PROCEDURE — 3079F DIAST BP 80-89 MM HG: CPT | Mod: CPTII,S$GLB,, | Performed by: OTOLARYNGOLOGY

## 2018-12-12 PROCEDURE — 3008F BODY MASS INDEX DOCD: CPT | Mod: CPTII,S$GLB,, | Performed by: OTOLARYNGOLOGY

## 2018-12-12 PROCEDURE — 3077F SYST BP >= 140 MM HG: CPT | Mod: CPTII,S$GLB,, | Performed by: OTOLARYNGOLOGY

## 2018-12-12 PROCEDURE — 1101F PT FALLS ASSESS-DOCD LE1/YR: CPT | Mod: CPTII,S$GLB,, | Performed by: OTOLARYNGOLOGY

## 2018-12-12 PROCEDURE — 99214 OFFICE O/P EST MOD 30 MIN: CPT | Mod: S$GLB,,, | Performed by: OTOLARYNGOLOGY

## 2018-12-12 RX ORDER — PREDNISONE 10 MG/1
TABLET ORAL
Qty: 18 TABLET | Refills: 0 | Status: SHIPPED | OUTPATIENT
Start: 2018-12-12 | End: 2019-01-16

## 2018-12-12 NOTE — PATIENT INSTRUCTIONS
See neurotologist re right CPA mass on recent MRI and bring outside disc for their review.        Course of prednisone as per instructions with food and take in am.

## 2018-12-13 NOTE — PROGRESS NOTES
"Subjective:       Patient ID: Cathy Reynolds is a 63 y.o. female.    Chief Complaint: Follow-up (and test results)    She returns for results and follow-up.  After her last visit she contacted the office and requested that her MRI be set up at another facility and the order for MRI brain and IACs was sent to [facility] at her request.  Since her last visit she states the humming in her right ear is actually a lot better and almost gone.   She also states she believes she can hear "okay".  She states her nose has been congested but is now a lot better and takes Flonase and Claritin as needed.  Her blood pressure is elevated again today but states she has white coat syndrome and her blood pressure is fine elsewhere and monitored.  She denies headache or dizziness or new complaints.              Review of Systems   Ears: Positive for hearing loss and ringing in ear.  Negative for ear pressure, ear discharge, ear infections, dizziness, head trauma and family history of hearing loss.    Nose:  Negative for nasal obstruction, nasal or sinus surgery and loss of smell.    Mouth/Throat: Negative for impaired swallowing, hoarse voice, throat mass, neck mass, oral ulcers and neck lumps.   Constitutional: Negative for recent unexplained weight loss and fever.    Eyes:  Negative for history of glaucoma and visual change.   Cardiovascular:  Positive for history of high blood pressure. Negative for chest pain and palpitations.   Respiratory:  Negative for asthma, emphysema, history of tuberculosis, recent cough and shortness of breath.    Gastrointestinal:  Negative for history of stomach ulcers or pain, blood in stool and change in stool.   Other:  Positive for anxiety. Negative for kidney problem, bladder problem, arthritis, new or changing moles, weakness, disturbances in coordination, slurred and swollen glands.           Objective:        Vitals:    12/12/18 1609   BP: (!) 147/87   Pulse: 99   Temp: 98.5 °F (36.9 " °C)     Body mass index is 17.45 kg/m².  Physical Exam   Constitutional: She is oriented to person, place, and time. She appears well-developed and well-nourished. No distress.   HENT:   Head: Normocephalic and atraumatic.   Right Ear: Tympanic membrane, external ear and ear canal normal.   Left Ear: Tympanic membrane, external ear and ear canal normal.   Nose: No mucosal edema, rhinorrhea or nasal deformity. No epistaxis.   Mouth/Throat: Uvula is midline, oropharynx is clear and moist and mucous membranes are normal. No oral lesions. No trismus in the jaw. No uvula swelling. No oropharyngeal exudate, posterior oropharyngeal edema or posterior oropharyngeal erythema.   Neck: Normal range of motion and phonation normal. Neck supple. No tracheal deviation present. No thyromegaly present.   Pulmonary/Chest: Effort normal. No respiratory distress.   Lymphadenopathy:     She has no cervical adenopathy.   Neurological: She is alert and oriented to person, place, and time. She displays no weakness.   Skin: Skin is warm and dry. She is not diaphoretic.   Psychiatric: She has a normal mood and affect. Her behavior is normal. Her speech is not slurred.       Tests / Results:    Called [facility] regarding MRI report which is now faxed and reviewed.  Impression states 24 x 21 x 19 mm mixed signal intensity enhancing mass at the right CP angle with mass effect on the brainstem and mild extension along the 7th 8th nerve complex in the IAC without expansion of the canal.  Addendum also reports asymmetry in the region of the cavernous sinus and Meckel's cave on the right representing 2nd mass versus heterogenous flow within the cavernous sinus.            Assessment:       1. Tinnitus of right ear    2. Asymmetrical sensorineural hearing loss    3. Abnormal finding on MRI of brain        Plan:       Reviewed above report with patient and concerns and recommendations and answered questions.  Needs to see neuro otology for  further evaluation and treatment and will facilitate and she is to bring outside disc for their review.  Course of steroids discussed and pros and cons and risks and answered questions and she understands and accepts and is considering and Rx provided and instructions reviewed.  Call if any questions or problems or change pending neuro otology evaluation.

## 2018-12-19 ENCOUNTER — OFFICE VISIT (OUTPATIENT)
Dept: OTOLARYNGOLOGY | Facility: CLINIC | Age: 63
End: 2018-12-19
Payer: COMMERCIAL

## 2018-12-19 VITALS — HEIGHT: 63 IN | BODY MASS INDEX: 16.64 KG/M2 | WEIGHT: 93.94 LBS

## 2018-12-19 DIAGNOSIS — H90.3 ASYMMETRICAL SENSORINEURAL HEARING LOSS: ICD-10-CM

## 2018-12-19 DIAGNOSIS — D33.3 CEREBELLOPONTINE ANGLE TUMOR: Primary | ICD-10-CM

## 2018-12-19 DIAGNOSIS — H93.11 TINNITUS OF RIGHT EAR: ICD-10-CM

## 2018-12-19 PROCEDURE — 3008F BODY MASS INDEX DOCD: CPT | Mod: CPTII,S$GLB,, | Performed by: OTOLARYNGOLOGY

## 2018-12-19 PROCEDURE — 99215 OFFICE O/P EST HI 40 MIN: CPT | Mod: S$GLB,,, | Performed by: OTOLARYNGOLOGY

## 2018-12-19 PROCEDURE — 99999 PR PBB SHADOW E&M-EST. PATIENT-LVL III: CPT | Mod: PBBFAC,,, | Performed by: OTOLARYNGOLOGY

## 2018-12-19 PROCEDURE — 1101F PT FALLS ASSESS-DOCD LE1/YR: CPT | Mod: CPTII,S$GLB,, | Performed by: OTOLARYNGOLOGY

## 2018-12-19 NOTE — LETTER
December 19, 2018      Talita Nunn MD  [address]  [address]  [facility] [address]           [facility] - Otorhinolaryngology  [address]  [facility] [address]  Phone: [phone]  Fax: [phone]          Patient: Cathy Reynolds   MR Number: [id number]   YOB: 1955   Date of Visit: 12/19/2018       Dear Dr. Talita Nunn:    Thank you for referring Cathy Reynolds to me for evaluation. Attached you will find relevant portions of my assessment and plan of care.    If you have questions, please do not hesitate to call me. I look forward to following Cathy Reynolds along with you.    Sincerely,    Robert Chan MD    Enclosure  CC:  No Recipients    If you would like to receive this communication electronically, please contact [email or web address] or [phone] to request more information on [facility] access.    For providers and/or their staff who would like to refer a patient to [facility], please contact us through our one-stop-shop provider referral line, [facility], at [phone].    If you feel you have received this communication in error or would no longer like to receive these types of communications, please e-mail [email or web address]

## 2018-12-19 NOTE — PROGRESS NOTES
Subjective:       Patient ID: Cathy Reynolds is a 63 y.o. female.    Chief Complaint: Tinnitus and Hearing Loss    Cathy Reynolds is a 63 y.o. Female presents on referral from Dr. Talita Nunn for Right CPA mass.  Patient reports 4 mo history of right sided tinnitus and decreased hearing.  Denies dizziness, balance disturbance, facial weakness or numbness.  No prior otologic history.  Denies ear pain.  Overall is healthy and active.         Ringing in Ears:    Associated symptoms: tinnitus.  No fever and no headaches.  Hearing Loss:    Associated symptoms: tinnitus.  No fever and no headaches.    Review of Systems   Constitutional: Negative for chills, fever and unexpected weight change.   HENT: Positive for hearing loss and tinnitus. Negative for sore throat and trouble swallowing.    Eyes: Negative for pain and visual disturbance.   Respiratory: Negative for apnea and shortness of breath.    Cardiovascular: Negative for chest pain and palpitations.   Gastrointestinal: Negative for abdominal pain and nausea.   Endocrine: Negative for cold intolerance and heat intolerance.   Musculoskeletal: Negative for joint swelling and neck stiffness.   Skin: Negative for color change and rash.   Neurological: Negative for facial asymmetry and headaches.   Hematological: Negative for adenopathy. Does not bruise/bleed easily.   Psychiatric/Behavioral: Negative for agitation. The patient is not nervous/anxious.        Objective:      Physical Exam   Constitutional: She is oriented to person, place, and time. She appears well-developed and well-nourished. No distress.   HENT:   Head: Normocephalic and atraumatic.   Right Ear: Tympanic membrane, external ear and ear canal normal.   Left Ear: Tympanic membrane, external ear and ear canal normal.   Nose: Nose normal.   Mouth/Throat: Uvula is midline, oropharynx is clear and moist and mucous membranes are normal.   Jorge: Midline  Rinne: AC > BC @ 512Hz   Eyes:  Conjunctivae and EOM are normal. Pupils are equal, round, and reactive to light.   Neck: Normal range of motion. No tracheal deviation present. No thyromegaly present.   Cardiovascular: Normal rate and regular rhythm.   Pulmonary/Chest: Effort normal. No respiratory distress.   Musculoskeletal: Normal range of motion.   Lymphadenopathy:        Head (right side): No submental, no submandibular and no tonsillar adenopathy present.        Head (left side): No submental, no submandibular and no tonsillar adenopathy present.     She has no cervical adenopathy.   Neurological: She is alert and oriented to person, place, and time.   Psychiatric: She has a normal mood and affect. Her behavior is normal.   Nursing note and vitals reviewed.      Data:    Audiogram tracings independently reviewed and discussed with patient shows bilateral SNHL moderate on left with severe loss on right with poor WRS of 20% on AD.    MRI:      MRI brain images  independently reviewed by me.  There is a right CPA mass measuring 25mm in largest dimension.  It is centered at IAC, but minimally involves IAC with no bone widening.  This may be a primary meningioma given minimal involvement of IAC or atypically located acoustic neuroma.    Assessment:       1. Cerebellopontine angle tumor    2. Tinnitus of right ear    3. Asymmetrical sensorineural hearing loss          In summary this is a pleasant 62 yo female with a right CPA mass consistent with AN or possibly meningioma given minimal involvement of IAC.  Given size and displacement of brainstem I do feel treatment should be recommended.  Given age would favor SRS, however the size and proximity to the brain stem may make this option unfavorable.  Will refer to Dr. Orion Woods for further recommendations.  Will be available for combined surgery if microsurgical resection favored.  Discussed plan for referral with patient  Plan:    Patient referred to Dr. Woods to evaluate for SRS vs  microsurgery.  CD taken to radiology to be uploaded to PACS    Robert Chan MD  Otology, Neurotology, and Skull Base Surgery  Department of Otorhinolaryngology  [facility]

## 2019-01-02 NOTE — PROGRESS NOTES
Alfred Dobbs, CCC-A  Audiologist - [facility] [address] [place] [address]  [email or web address]  [phone]    Patient: Cathy Reynolds   MRN: [id number]  : 1955  ROSEN: 10/25/2018      AUDIOLOGICAL EVALUATION      RECOMMENDATIONS:   It is recommended that she:  Follow up medically with a physician to assess asymmetrical hearing loss & tinnitus.  Receive binaural hearing aids to improve speech understanding, pending treatment and medical clearance.  Continue to receive audiological monitoring annually.  Use precaution and/or hearing protection in noisy environments.    If you should have any questions or concerns regarding the above information, please do not hesitate to contact me at [phone].      _______________________________  Alfred Dobbs, GILLES-A  Audiologist

## 2019-01-16 ENCOUNTER — OFFICE VISIT (OUTPATIENT)
Dept: NEUROSURGERY | Facility: CLINIC | Age: 64
End: 2019-01-16
Payer: COMMERCIAL

## 2019-01-16 VITALS
WEIGHT: 93 LBS | SYSTOLIC BLOOD PRESSURE: 150 MMHG | HEART RATE: 94 BPM | HEIGHT: 63 IN | DIASTOLIC BLOOD PRESSURE: 81 MMHG | BODY MASS INDEX: 16.48 KG/M2 | TEMPERATURE: 98 F

## 2019-01-16 DIAGNOSIS — D33.3 ACOUSTIC NEUROMA: Primary | ICD-10-CM

## 2019-01-16 PROCEDURE — 99204 OFFICE O/P NEW MOD 45 MIN: CPT | Mod: S$GLB,,, | Performed by: NEUROLOGICAL SURGERY

## 2019-01-16 PROCEDURE — 3077F PR MOST RECENT SYSTOLIC BLOOD PRESSURE >= 140 MM HG: ICD-10-PCS | Mod: CPTII,S$GLB,, | Performed by: NEUROLOGICAL SURGERY

## 2019-01-16 PROCEDURE — 1101F PT FALLS ASSESS-DOCD LE1/YR: CPT | Mod: CPTII,S$GLB,, | Performed by: NEUROLOGICAL SURGERY

## 2019-01-16 PROCEDURE — 99204 PR OFFICE/OUTPT VISIT, NEW, LEVL IV, 45-59 MIN: ICD-10-PCS | Mod: S$GLB,,, | Performed by: NEUROLOGICAL SURGERY

## 2019-01-16 PROCEDURE — 3079F DIAST BP 80-89 MM HG: CPT | Mod: CPTII,S$GLB,, | Performed by: NEUROLOGICAL SURGERY

## 2019-01-16 PROCEDURE — 3008F BODY MASS INDEX DOCD: CPT | Mod: CPTII,S$GLB,, | Performed by: NEUROLOGICAL SURGERY

## 2019-01-16 PROCEDURE — 3077F SYST BP >= 140 MM HG: CPT | Mod: CPTII,S$GLB,, | Performed by: NEUROLOGICAL SURGERY

## 2019-01-16 PROCEDURE — 3079F PR MOST RECENT DIASTOLIC BLOOD PRESSURE 80-89 MM HG: ICD-10-PCS | Mod: CPTII,S$GLB,, | Performed by: NEUROLOGICAL SURGERY

## 2019-01-16 PROCEDURE — 99999 PR PBB SHADOW E&M-EST. PATIENT-LVL III: ICD-10-PCS | Mod: PBBFAC,,, | Performed by: NEUROLOGICAL SURGERY

## 2019-01-16 PROCEDURE — 1101F PR PT FALLS ASSESS DOC 0-1 FALLS W/OUT INJ PAST YR: ICD-10-PCS | Mod: CPTII,S$GLB,, | Performed by: NEUROLOGICAL SURGERY

## 2019-01-16 PROCEDURE — 3008F PR BODY MASS INDEX (BMI) DOCUMENTED: ICD-10-PCS | Mod: CPTII,S$GLB,, | Performed by: NEUROLOGICAL SURGERY

## 2019-01-16 PROCEDURE — 99999 PR PBB SHADOW E&M-EST. PATIENT-LVL III: CPT | Mod: PBBFAC,,, | Performed by: NEUROLOGICAL SURGERY

## 2019-01-16 NOTE — PATIENT INSTRUCTIONS
I have personally reviewed the MRI brain with the pt which shows a sizable tumor near the right IAC that is compressing the brain stem.     Pt informed that these tumor are generally benign but given its size, I recommend treatment. I have discussed the following treatment options:  1. Craniotomy for resection   2. Stereotactic radiosurgery      I recommend stereotactic radiosurgery to the tumor. I have discussed the risks/benefits, indications, and alternatives for the proposed procedure in detail.  I have answered all of their questions and the pt wishes to proceed with surgery.  We will schedule the pt on the next available date.

## 2019-01-16 NOTE — PROGRESS NOTES
Subjective:   I, Sarahi Amador, attest that this documentation has been prepared under the direction and in the presence of Orion Woods MD.     Patient ID: Cathy Reynolds is a 63 y.o. female     Chief Complaint: CPA TUMOR      HPI  Ms. Cathy Reynolds is a pleasant 63 y.o. woman who was referred from Dr. Chan for evaluation of recently discovered cerebellopontine angle tumor. Pt was in her normal state of health until August 2018 when she developed gradual tinnitus in the right ear. She was seen by Otolaryngology in mid October and found to have hearing loss in that ear. 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  She saw neuro-Otolaryngology on 12/19 who referred patient to my care. She states her hearing has decreased to the point where she often relies on her left ear. She mentions no other complaints at this time.      Review of Systems   Constitutional: Negative for activity change, fatigue, fever and unexpected weight change.   HENT: Positive for hearing loss (right). Negative for facial swelling.    Eyes: Negative.    Respiratory: Negative.    Cardiovascular: Negative.    Gastrointestinal: Negative for diarrhea, nausea and vomiting.   Genitourinary: Negative.    Musculoskeletal: Negative for back pain, joint swelling, myalgias and neck pain.   Neurological: Negative for dizziness, weakness, numbness and headaches.   Psychiatric/Behavioral: Negative.       Past Medical History:   Diagnosis Date    AR (allergic rhinitis)     Diverticulosis     Ear pain, right     Elevated LFTs     borderline - due to OTC herbals - resolved    History of colon polyps     Hyperlipidemia     borderline with high HDL    Hypertension     Mild anxiety     Osteoporosis, postmenopausal     Postmenopausal status     Ringing in ear, right     Underweight     Vitamin D deficiency        Objective:      Vitals:    01/16/19 1303   BP: (!) 150/81   Pulse: 94   Temp:  98.2 °F (36.8 °C)      Physical Exam   Constitutional: She is oriented to person, place, and time. She appears well-developed and well-nourished.   HENT:   Head: Normocephalic and atraumatic.   Neck: Neck supple.   Neurological: She is alert and oriented to person, place, and time. No cranial nerve deficit. She displays a negative Romberg sign. GCS eye subscore is 4. GCS verbal subscore is 5. GCS motor subscore is 6.        IMAGING:  MRI Previous (12/20/2018) shows a sizable tumor near the right IAC with mass effect on the brain stem.    I have personally reviewed the images with the pt.      I, Dr. Orion Woods, personally performed the services described in this documentation. All medical record entries made by the scribe, Sarahi Amador, were at my direction and in my presence.  I have reviewed the chart and agree that the record reflects my personal performance and is accurate and complete. Orion Woods MD. 01/16/2019    Assessment:       Acoustic neuroma.     Plan:   I have personally reviewed the MRI brain with the pt which shows a sizable tumor near the right IAC that is compressing the brain stem.    Pt informed that these tumor are generally benign but given its size, I recommend treatment. I have discussed the following treatment options:  1. Craniotomy for resection   2. Stereotactic radiosurgery     I recommend stereotactic radiosurgery to the tumor. I have discussed the risks/benefits, indications, and alternatives for the proposed procedure in detail.  I have answered all of their questions and the pt wishes to proceed with surgery.  We will schedule the pt on the next available date.

## 2019-01-16 NOTE — LETTER
January 19, 2019      Robert Chan MD  [address] [facility] [address]  [facility] [address]           [facility] 7th Fl  [address]  [facility] [address]  Phone: [phone]          Patient: Cathy Reynolds   MR Number: [id number]   YOB: 1955   Date of Visit: 1/16/2019       Dear Dr. Robert Chan:    Thank you for referring Cathy Reynolds to me for evaluation. Attached you will find relevant portions of my assessment and plan of care.    If you have questions, please do not hesitate to call me. I look forward to following Cathy Reynolds along with you.    Sincerely,    Orion Woods MD    Enclosure  CC:  No Recipients    If you would like to receive this communication electronically, please contact [email or web address] or [phone] to request more information on [employer] access.    For providers and/or their staff who would like to refer a patient to [facility], please contact us through our one-stop-shop provider referral line, [facility] , at [phone].    If you feel you have received this communication in error or would no longer like to receive these types of communications, please e-mail [email or web address]

## 2019-01-25 PROCEDURE — 61798 SRS CRANIAL LESION COMPLEX: CPT | Mod: ,,, | Performed by: NEUROLOGICAL SURGERY

## 2019-01-25 PROCEDURE — 61800 PR APPLY STEREOTACTIC HEADFRAME FOR RADIOSURGERY: ICD-10-PCS | Mod: ,,, | Performed by: NEUROLOGICAL SURGERY

## 2019-01-25 PROCEDURE — 61798 PR STEREOTACTIC RADIOSURGERY, CRANIAL,COMPLEX,SINGLE: ICD-10-PCS | Mod: ,,, | Performed by: NEUROLOGICAL SURGERY

## 2019-01-25 PROCEDURE — 61800 APPLY SRS HEADFRAME ADD-ON: CPT | Mod: ,,, | Performed by: NEUROLOGICAL SURGERY

## 2019-01-29 ENCOUNTER — TELEPHONE (OUTPATIENT)
Dept: NEUROSURGERY | Facility: CLINIC | Age: 64
End: 2019-01-29

## 2019-01-29 NOTE — TELEPHONE ENCOUNTER
L/M with info on 2 wk virtual visit.    ----- Message from Dashawn Jorge sent at 1/29/2019  4:25 PM CST -----  Contact: Patient @ [phone]  Patient calling to schedule the 2wk post op gamma knife appt, pls call

## 2019-02-08 ENCOUNTER — OUTSIDE PLACE OF SERVICE (OUTPATIENT)
Dept: NEUROSURGERY | Facility: CLINIC | Age: 64
End: 2019-02-08
Payer: COMMERCIAL

## 2019-02-08 ENCOUNTER — TELEPHONE (OUTPATIENT)
Dept: NEUROSURGERY | Facility: CLINIC | Age: 64
End: 2019-02-08

## 2019-02-08 NOTE — TELEPHONE ENCOUNTER
Spoke with pt. Doing fine after GSRS. No complaints. Gave her permission to go to the [facility] and have a colonoscopy.    She will call back to make her 3 mo appt with [facility].    ----- Message from Madison Barajas sent at 2/8/2019  3:51 PM CST -----  Contact: Self [phone]  Pt is requesting a call back to do her post op virtual visit.  [facility]    Pt may be reached at [phone].    Thank you.  KLEBER

## 2019-02-18 ENCOUNTER — PATIENT OUTREACH (OUTPATIENT)
Dept: ADMINISTRATIVE | Facility: HOSPITAL | Age: 64
End: 2019-02-18

## 2019-02-26 ENCOUNTER — TELEPHONE (OUTPATIENT)
Dept: FAMILY MEDICINE | Facility: CLINIC | Age: 64
End: 2019-02-26

## 2019-02-26 DIAGNOSIS — I10 ESSENTIAL HYPERTENSION: Primary | ICD-10-CM

## 2019-02-26 NOTE — TELEPHONE ENCOUNTER
----- Message from Carline Silverman sent at 2/26/2019  1:10 PM CST -----  Contact: Self  Patient called to request lab orders before upcoming appointment on 03/04/2019. Please call to advise at [phone].

## 2019-02-27 ENCOUNTER — TELEPHONE (OUTPATIENT)
Dept: FAMILY MEDICINE | Facility: CLINIC | Age: 64
End: 2019-02-27

## 2019-02-27 DIAGNOSIS — Z12.31 SCREENING MAMMOGRAM, ENCOUNTER FOR: Primary | ICD-10-CM

## 2019-02-28 NOTE — TELEPHONE ENCOUNTER
----- Message from Neyda Hatch sent at 2/27/2019  4:30 PM CST -----  Contact: Self: [phone]  Patient would like to be scheduled to have a mammogram the same day as her appt 03/04/2019. She has no mammogram orders in her chart so I am  unable to schedule her. Please call to advise, Thank you

## 2019-03-01 ENCOUNTER — TELEPHONE (OUTPATIENT)
Dept: NEUROSURGERY | Facility: CLINIC | Age: 64
End: 2019-03-01

## 2019-03-01 DIAGNOSIS — D33.3 ACOUSTIC NEUROMA: Primary | ICD-10-CM

## 2019-03-04 ENCOUNTER — LAB VISIT (OUTPATIENT)
Dept: LAB | Facility: HOSPITAL | Age: 64
End: 2019-03-04
Attending: INTERNAL MEDICINE
Payer: COMMERCIAL

## 2019-03-04 ENCOUNTER — OFFICE VISIT (OUTPATIENT)
Dept: FAMILY MEDICINE | Facility: CLINIC | Age: 64
End: 2019-03-04
Payer: COMMERCIAL

## 2019-03-04 VITALS
HEART RATE: 102 BPM | BODY MASS INDEX: 16.95 KG/M2 | WEIGHT: 95.69 LBS | HEIGHT: 63 IN | TEMPERATURE: 98 F | DIASTOLIC BLOOD PRESSURE: 72 MMHG | OXYGEN SATURATION: 93 % | SYSTOLIC BLOOD PRESSURE: 125 MMHG

## 2019-03-04 DIAGNOSIS — I10 ESSENTIAL HYPERTENSION: ICD-10-CM

## 2019-03-04 DIAGNOSIS — Z23 NEED FOR TDAP VACCINATION: ICD-10-CM

## 2019-03-04 DIAGNOSIS — M81.0 OSTEOPOROSIS, POSTMENOPAUSAL: ICD-10-CM

## 2019-03-04 DIAGNOSIS — Z23 NEED FOR STREPTOCOCCUS PNEUMONIAE VACCINATION: ICD-10-CM

## 2019-03-04 DIAGNOSIS — J30.1 SEASONAL ALLERGIC RHINITIS DUE TO POLLEN: ICD-10-CM

## 2019-03-04 DIAGNOSIS — Z23 NEED FOR SHINGLES VACCINE: ICD-10-CM

## 2019-03-04 DIAGNOSIS — E55.9 VITAMIN D DEFICIENCY: ICD-10-CM

## 2019-03-04 DIAGNOSIS — Z00.00 ROUTINE MEDICAL EXAM: Primary | ICD-10-CM

## 2019-03-04 DIAGNOSIS — R63.6 UNDERWEIGHT: ICD-10-CM

## 2019-03-04 DIAGNOSIS — D33.3 CEREBELLOPONTINE ANGLE TUMOR: ICD-10-CM

## 2019-03-04 LAB
ALBUMIN SERPL BCP-MCNC: 4.3 G/DL
ALP SERPL-CCNC: 52 U/L
ALT SERPL W/O P-5'-P-CCNC: 21 U/L
ANION GAP SERPL CALC-SCNC: 8 MMOL/L
AST SERPL-CCNC: 23 U/L
BASOPHILS # BLD AUTO: 0.03 K/UL
BASOPHILS NFR BLD: 0.5 %
BILIRUB SERPL-MCNC: 0.4 MG/DL
BUN SERPL-MCNC: 14 MG/DL
CALCIUM SERPL-MCNC: 9.9 MG/DL
CHLORIDE SERPL-SCNC: 101 MMOL/L
CHOLEST SERPL-MCNC: 234 MG/DL
CHOLEST/HDLC SERPL: 2.4 {RATIO}
CO2 SERPL-SCNC: 30 MMOL/L
CREAT SERPL-MCNC: 0.7 MG/DL
DIFFERENTIAL METHOD: ABNORMAL
EOSINOPHIL # BLD AUTO: 0 K/UL
EOSINOPHIL NFR BLD: 0.4 %
ERYTHROCYTE [DISTWIDTH] IN BLOOD BY AUTOMATED COUNT: 13.3 %
EST. GFR  (AFRICAN AMERICAN): >60 ML/MIN/1.73 M^2
EST. GFR  (NON AFRICAN AMERICAN): >60 ML/MIN/1.73 M^2
GLUCOSE SERPL-MCNC: 90 MG/DL
HCT VFR BLD AUTO: 46 %
HDLC SERPL-MCNC: 97 MG/DL
HDLC SERPL: 41.5 %
HGB BLD-MCNC: 13.6 G/DL
IMM GRANULOCYTES # BLD AUTO: 0.01 K/UL
IMM GRANULOCYTES NFR BLD AUTO: 0.2 %
LDLC SERPL CALC-MCNC: 126 MG/DL
LYMPHOCYTES # BLD AUTO: 2.2 K/UL
LYMPHOCYTES NFR BLD: 38 %
MCH RBC QN AUTO: 25.6 PG
MCHC RBC AUTO-ENTMCNC: 29.6 G/DL
MCV RBC AUTO: 87 FL
MONOCYTES # BLD AUTO: 0.5 K/UL
MONOCYTES NFR BLD: 8.8 %
NEUTROPHILS # BLD AUTO: 3 K/UL
NEUTROPHILS NFR BLD: 52.1 %
NONHDLC SERPL-MCNC: 137 MG/DL
NRBC BLD-RTO: 0 /100 WBC
PLATELET # BLD AUTO: 239 K/UL
PMV BLD AUTO: 11.6 FL
POTASSIUM SERPL-SCNC: 4.4 MMOL/L
PROT SERPL-MCNC: 7.6 G/DL
RBC # BLD AUTO: 5.31 M/UL
SODIUM SERPL-SCNC: 139 MMOL/L
TRIGL SERPL-MCNC: 55 MG/DL
WBC # BLD AUTO: 5.69 K/UL

## 2019-03-04 PROCEDURE — 90670 PNEUMOCOCCAL CONJUGATE VACCINE 13-VALENT LESS THAN 5YO & GREATER THAN: ICD-10-PCS | Mod: S$GLB,,, | Performed by: INTERNAL MEDICINE

## 2019-03-04 PROCEDURE — 85025 COMPLETE CBC W/AUTO DIFF WBC: CPT

## 2019-03-04 PROCEDURE — 99397 PR PREVENTIVE VISIT,EST,65 & OVER: ICD-10-PCS | Mod: 25,S$GLB,, | Performed by: INTERNAL MEDICINE

## 2019-03-04 PROCEDURE — 80061 LIPID PANEL: CPT

## 2019-03-04 PROCEDURE — 3078F PR MOST RECENT DIASTOLIC BLOOD PRESSURE < 80 MM HG: ICD-10-PCS | Mod: CPTII,S$GLB,, | Performed by: INTERNAL MEDICINE

## 2019-03-04 PROCEDURE — 3078F DIAST BP <80 MM HG: CPT | Mod: CPTII,S$GLB,, | Performed by: INTERNAL MEDICINE

## 2019-03-04 PROCEDURE — 99999 PR PBB SHADOW E&M-EST. PATIENT-LVL III: CPT | Mod: PBBFAC,,, | Performed by: INTERNAL MEDICINE

## 2019-03-04 PROCEDURE — 90670 PCV13 VACCINE IM: CPT | Mod: S$GLB,,, | Performed by: INTERNAL MEDICINE

## 2019-03-04 PROCEDURE — 3074F PR MOST RECENT SYSTOLIC BLOOD PRESSURE < 130 MM HG: ICD-10-PCS | Mod: CPTII,S$GLB,, | Performed by: INTERNAL MEDICINE

## 2019-03-04 PROCEDURE — 99999 PR PBB SHADOW E&M-EST. PATIENT-LVL III: ICD-10-PCS | Mod: PBBFAC,,, | Performed by: INTERNAL MEDICINE

## 2019-03-04 PROCEDURE — 3074F SYST BP LT 130 MM HG: CPT | Mod: CPTII,S$GLB,, | Performed by: INTERNAL MEDICINE

## 2019-03-04 PROCEDURE — 36415 COLL VENOUS BLD VENIPUNCTURE: CPT | Mod: PO

## 2019-03-04 PROCEDURE — 90471 IMMUNIZATION ADMIN: CPT | Mod: S$GLB,,, | Performed by: INTERNAL MEDICINE

## 2019-03-04 PROCEDURE — 80053 COMPREHEN METABOLIC PANEL: CPT

## 2019-03-04 PROCEDURE — 99397 PER PM REEVAL EST PAT 65+ YR: CPT | Mod: 25,S$GLB,, | Performed by: INTERNAL MEDICINE

## 2019-03-04 PROCEDURE — 90471 PNEUMOCOCCAL CONJUGATE VACCINE 13-VALENT LESS THAN 5YO & GREATER THAN: ICD-10-PCS | Mod: S$GLB,,, | Performed by: INTERNAL MEDICINE

## 2019-03-04 RX ORDER — ALENDRONATE SODIUM 70 MG/1
70 TABLET ORAL
Qty: 12 TABLET | Refills: 3 | Status: SHIPPED | OUTPATIENT
Start: 2019-03-04 | End: 2020-03-01

## 2019-03-04 NOTE — PROGRESS NOTES
HISTORY OF PRESENT ILLNESS:  Cathy Reynolds is a 63 y.o. female who presents to the clinic today for a routine medical physical exam. Her last physical exam was approximately 1 years(s) ago.        PAST MEDICAL HISTORY:  Past Medical History:   Diagnosis Date    AR (allergic rhinitis)     Diverticulosis     Ear pain, right     Elevated LFTs     borderline - due to OTC herbals - resolved    History of colon polyps     Hyperlipidemia     borderline with high HDL    Hypertension     Mild anxiety     Osteoporosis, postmenopausal     Postmenopausal status     Ringing in ear, right     Underweight     Vitamin D deficiency        PAST SURGICAL HISTORY:  Past Surgical History:   Procedure Laterality Date     SECTION, LOW TRANSVERSE      COLONOSCOPY      COLONOSCOPY N/A 2015    Performed by Fausto Shirley MD at [facility] ([address] [place] [address])    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    MYOMECTOMY      polyp removal from cervix         SOCIAL HISTORY:  Social History     Socioeconomic History    Marital status:      Spouse name: Not on file    Number of children: 2    Years of education: Not on file    Highest education level: Not on file   Social Needs    Financial resource strain: Not on file    Food insecurity - worry: Not on file    Food insecurity - inability: Not on file    Transportation needs - medical: Not on file    Transportation needs - non-medical: Not on file   Occupational History    Not on file   Tobacco Use    Smoking status: Never Smoker    Smokeless tobacco: Never Used   Substance and Sexual Activity    Alcohol use: No    Drug use: No    Sexual activity: Yes     Partners: Male     Birth control/protection: None   Other Topics Concern    Not on file   Social History Narrative    Teacher at [employer]           FAMILY HISTORY:  Family History   Problem Relation Age of Onset    Hypertension Mother     Hypertension Brother      Hypertension Sister     Diabetes Sister     Breast cancer Maternal Aunt     Colon cancer Neg Hx     Ovarian cancer Neg Hx        ALLERGIES AND MEDICATIONS: updated and reviewed.  Review of patient's allergies indicates:   Allergen Reactions    Ace inhibitors      Other reaction(s): cough    Penicillins Hives     Medication List with Changes/Refills   Current Medications    AMLODIPINE (NORVASC) 5 MG TABLET    TAKE 1 TABLET(5 MG) BY MOUTH EVERY DAY    CHOLECALCIFEROL, VITAMIN D3, (VITAMIN D3) 2,000 UNIT CAP    Take 2 capsules by mouth once daily.     FLUTICASONE (FLONASE) 50 MCG/ACTUATION NASAL SPRAY    SPRAY TWICE IN EACH NOSTRIL ONCE DAILY    OLOPATADINE (PATANOL) 0.1 % OPHTHALMIC SOLUTION    INT 1 GTT ON OU BID    TRIAMCINOLONE ACETONIDE 0.5% (KENALOG) 0.5 % CREA    Apply topically 2 (two) times daily.   Changed and/or Refilled Medications    Modified Medication Previous Medication    ALENDRONATE (FOSAMAX) 70 MG TABLET alendronate (FOSAMAX) 70 MG tablet       Take 1 tablet (70 mg total) by mouth every 7 days.    Take 70 mg by mouth every 7 days.    Discontinued Medications    LORATADINE (CLARITIN) 10 MG TABLET    TAKE 1 TABLET(10 MG) BY MOUTH EVERY DAY         CARE TEAM:  Patient Care Team:  Masha Elizondo MD as PCP - General (Internal Medicine)           SCREENING HISTORY:  Health Maintenance       Date Due Completion Date    TETANUS VACCINE 07/05/1971 ---    Zoster Vaccine 07/05/2013 ---    Pneumococcal Vaccine (65+ Low/Medium Risk) (1 of 2 - PCV13) 07/05/2018 ---    Mammogram 03/05/2019 3/5/2018    Override on 2/22/2016: Done    DEXA SCAN 03/05/2020 3/5/2018    Override on 8/11/2011: Done    Override on 8/11/2011: Done    Colonoscopy 05/08/2020 5/8/2015    Override on 12/27/2006: Done    Lipid Panel 02/17/2023 2/17/2018            REVIEW OF SYSTEMS:   The patient reports: good dietary habits.  The patient reports : that they exercise regularly.  Review of Systems   Constitutional: Negative for chills,  "fatigue, fever and unexpected weight change.   HENT: Positive for hearing loss and tinnitus. Negative for congestion and postnasal drip.         The patient reports that last fall she developed worsening tinnitus and hearing loss in the right ear.  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  She underwent Gamma radio surgery on 01/25.  She still has some mild tinnitus, but feels her hearing has improved some.  She has follow-up with Neurosurgery in the near future with repeat MRI.   Eyes: Negative for pain and visual disturbance.   Respiratory: Negative for cough, shortness of breath and wheezing.    Cardiovascular: Negative for chest pain, palpitations and leg swelling.   Gastrointestinal: Negative for abdominal pain, constipation, diarrhea, nausea and vomiting.   Genitourinary: Negative for dysuria.   Musculoskeletal: Negative for arthralgias and back pain.   Skin: Negative for rash.   Neurological: Negative for weakness and headaches.   Psychiatric/Behavioral: Negative for dysphoric mood and sleep disturbance. The patient is not nervous/anxious.      Breast ROS: negative for breast lumps             Physical Examination:   Vitals:    03/04/19 1328   BP: 125/72   Pulse:    Temp:      Weight: 43.4 kg (95 lb 10.9 oz)   Height: 5' 3" (160 cm)   Body mass index is 16.95 kg/m².      Patient did not require to have a chaperone present during the exam today.  General appearance - alert, well appearing, and in no distress and thin  Mental status - alert, oriented to person, place, and time, normal mood, behavior, speech, dress, motor activity, and thought processes  Eyes - pupils equal and reactive, extraocular eye movements intact, sclera anicteric  Mouth - mucous membranes moist, pharynx normal without lesions  Neck - supple, no significant adenopathy, carotids upstroke normal bilaterally, no bruits, thyroid exam: thyroid is normal in size without nodules or tenderness  Lymphatics - no palpable " lymphadenopathy  Chest - clear to auscultation, no wheezes, rales or rhonchi, symmetric air entry  Heart - normal rate and regular rhythm, no gallops noted  Abdomen - soft, nontender, nondistended, no masses or organomegaly  Breasts - not examined  Back exam - full range of motion, no tenderness, palpable spasm or pain on motion  Neurological - alert, oriented, normal speech, no focal findings or movement disorder noted, cranial nerves II through XII intact  Musculoskeletal - no joint tenderness, deformity or swelling, no muscular tenderness noted  Extremities - peripheral pulses normal, no pedal edema, no clubbing or cyanosis  Skin - normal coloration and turgor, no rashes, no suspicious skin lesions noted      ASSESSMENT AND PLAN:  1. Routine medical exam  Counseled on age appropriate medical preventative services including age appropriate cancer screenings, age appropriate eye and dental exams, over all nutritional health, need for a consistent exercise regimen, and an over all push towards maintaining a vigorous and active lifestyle.  Counseled on age appropriate vaccines and discussed upcoming health care needs based on age/gender. Discussed good sleep hygiene and stress management. Labs drawn today pending.    2. Essential hypertension  Discussed sodium restriction, maintaining ideal body weight and regular exercise program as physiologic means to achieve blood pressure control. The patient will strive towards this. The current medical regimen is effective;  continue present plan and medications. Recommended patient to check home readings to monitor and see me for followup as scheduled or sooner as needed. Patient was educated that both decongestant and anti-inflammatory medication may raise blood pressure.     3. Osteoporosis, postmenopausal/4. Vitamin D deficiency  We discussed adequate calcium and vitamin D supplementation. We discussed fall precautions. She is up to date on her BMD. Continue current  treatment regimen (fosamax)   - alendronate (FOSAMAX) 70 MG tablet; Take 1 tablet (70 mg total) by mouth every 7 days.  Dispense: 12 tablet; Refill: 3    5. Cerebellopontine angle tumor  Patient is status post recent Gamma radio surgery.  She has follow-up with Neurosurgery in the near future for MRI and reassessment.    6. Underweight  Stable.    7. Need for Tdap vaccination  Patient was advised to get immunization at the pharmacy.     8. Need for Streptococcus pneumoniae vaccination    - Pneumococcal Conjugate Vaccine (13 Valent) (IM)    9. Need for shingles vaccine  Patient was advised to get immunization at the pharmacy.     10. Seasonal allergic rhinitis due to pollen  The patient feels that Claritin is not working very well for her anymore.  She can try over-the-counter Allegra or Zyrtec instead.  We also discussed saline nasal spray or over-the-counter Flonase.  She will let me know if symptoms worsen or persist.          Follow-up in about 1 year (around 3/4/2020), or if symptoms worsen or fail to improve, for annual exam. or sooner as needed.

## 2019-03-06 ENCOUNTER — TELEPHONE (OUTPATIENT)
Dept: NEUROSURGERY | Facility: CLINIC | Age: 64
End: 2019-03-06

## 2019-03-06 ENCOUNTER — HOSPITAL ENCOUNTER (OUTPATIENT)
Dept: RADIOLOGY | Facility: OTHER | Age: 64
Discharge: HOME | End: 2019-03-06
Attending: INTERNAL MEDICINE
Payer: COMMERCIAL

## 2019-03-06 VITALS — WEIGHT: 95 LBS | BODY MASS INDEX: 16.83 KG/M2 | HEIGHT: 63 IN

## 2019-03-06 DIAGNOSIS — Z12.31 SCREENING MAMMOGRAM, ENCOUNTER FOR: ICD-10-CM

## 2019-03-06 DIAGNOSIS — D33.3 CEREBELLOPONTINE ANGLE TUMOR: Primary | ICD-10-CM

## 2019-03-06 PROCEDURE — 77063 BREAST TOMOSYNTHESIS BI: CPT | Mod: 26,,, | Performed by: RADIOLOGY

## 2019-03-06 PROCEDURE — 77067 SCR MAMMO BI INCL CAD: CPT | Mod: TC

## 2019-03-06 PROCEDURE — 77063 MAMMO DIGITAL SCREENING BILAT WITH TOMOSYNTHESIS_CAD: ICD-10-PCS | Mod: 26,,, | Performed by: RADIOLOGY

## 2019-03-06 PROCEDURE — 77067 MAMMO DIGITAL SCREENING BILAT WITH TOMOSYNTHESIS_CAD: ICD-10-PCS | Mod: 26,,, | Performed by: RADIOLOGY

## 2019-03-06 PROCEDURE — 77067 SCR MAMMO BI INCL CAD: CPT | Mod: 26,,, | Performed by: RADIOLOGY

## 2019-03-06 NOTE — TELEPHONE ENCOUNTER
Left voicemail informing pt that order has been faxed as requested.    ----- Message from Savana Crabtree sent at 3/6/2019 11:44 AM CST -----  Contact: Pt. [phone]  Needs Advice    Reason for call: The patient would like to speak to someone regarding her MRI order. Please fax to [facility] at [phone].         Communication Preference:PHONE    Additional Information:

## 2019-03-12 ENCOUNTER — TELEPHONE (OUTPATIENT)
Dept: NEUROSURGERY | Facility: CLINIC | Age: 64
End: 2019-03-12

## 2019-03-12 NOTE — TELEPHONE ENCOUNTER
L/M that fax went thru.    ----- Message from Katarina Florian sent at 3/12/2019  1:39 PM CDT -----  Contact: pt at [phone]  Peggy pt-pt is waiting for 2 days now for her MRI to be faxed to [facility] at fax number [phone].  They have not received it yet.  The need it asap.  Thanks.  Please call pt with update.

## 2019-03-20 ENCOUNTER — TELEPHONE (OUTPATIENT)
Dept: NEUROSURGERY | Facility: CLINIC | Age: 64
End: 2019-03-20

## 2019-03-20 NOTE — TELEPHONE ENCOUNTER
----- Message from Carlyn Bonilla sent at 3/20/2019  1:14 PM CDT -----  Contact: pt   Needs Advice    Reason for call: pt is calling about her MRI pt said [employer] pt is asking if Dr Woods nurse can call the people back pt won't be in the window of April 23 thru the 25th pt said she will be in jury duty the first two weeks in April pt was told she needs to do the test at the end of April. Pt said to call them and have the date changed pt said they have a contract number [id number] and certification number [id number]        Communication Preference: can you please call pt at [phone]    Additional Information: none    SERGIO

## 2019-03-20 NOTE — TELEPHONE ENCOUNTER
Left voicemail requesting pt to call back to clarify her request. MRI order has been verified received by outside imaging center where pt has specifically requested to have it done.

## 2019-03-20 NOTE — TELEPHONE ENCOUNTER
Pt called back explaining that she is already scheduled for her 3 month f/u MRI Brain at [facility] on 4/25. However, her insurance has only authorized her to get the MRI from March 15 - April 13. This window is too soon for her 3 month follow-up from gamma radiosurgery; pt additionally is stuck in jury duty for the duration of this window. She requests that Dr. Woods's office contact [facility] to have authorization window moved.    Contract Number: [id number]  Certification Number: [id number]

## 2019-04-09 ENCOUNTER — TELEPHONE (OUTPATIENT)
Dept: NEUROSURGERY | Facility: CLINIC | Age: 64
End: 2019-04-09

## 2019-04-09 DIAGNOSIS — D33.3 CEREBELLOPONTINE ANGLE TUMOR: Primary | ICD-10-CM

## 2019-04-13 DIAGNOSIS — J30.1 ACUTE SEASONAL ALLERGIC RHINITIS DUE TO POLLEN: ICD-10-CM

## 2019-04-15 RX ORDER — FLUTICASONE PROPIONATE 50 MCG
SPRAY, SUSPENSION (ML) NASAL
Qty: 16 ML | Refills: 2 | Status: SHIPPED | OUTPATIENT
Start: 2019-04-15 | End: 2019-12-10 | Stop reason: SDUPTHER

## 2019-04-15 NOTE — TELEPHONE ENCOUNTER
Patient states she still has a little mass under her arm. Patient was offered an appointment for observation she stated she will call back.   15-Apr-2019 06:14:30

## 2019-04-30 ENCOUNTER — OFFICE VISIT (OUTPATIENT)
Dept: NEUROSURGERY | Facility: CLINIC | Age: 64
End: 2019-04-30
Payer: COMMERCIAL

## 2019-04-30 VITALS
HEART RATE: 93 BPM | WEIGHT: 98.31 LBS | DIASTOLIC BLOOD PRESSURE: 87 MMHG | BODY MASS INDEX: 17.42 KG/M2 | TEMPERATURE: 98 F | SYSTOLIC BLOOD PRESSURE: 137 MMHG

## 2019-04-30 DIAGNOSIS — D33.3 ACOUSTIC NEUROMA: Primary | ICD-10-CM

## 2019-04-30 PROCEDURE — 1101F PR PT FALLS ASSESS DOC 0-1 FALLS W/OUT INJ PAST YR: ICD-10-PCS | Mod: CPTII,S$GLB,, | Performed by: NEUROLOGICAL SURGERY

## 2019-04-30 PROCEDURE — 3008F PR BODY MASS INDEX (BMI) DOCUMENTED: ICD-10-PCS | Mod: CPTII,S$GLB,, | Performed by: NEUROLOGICAL SURGERY

## 2019-04-30 PROCEDURE — 99999 PR PBB SHADOW E&M-EST. PATIENT-LVL III: CPT | Mod: PBBFAC,,, | Performed by: NEUROLOGICAL SURGERY

## 2019-04-30 PROCEDURE — 1101F PT FALLS ASSESS-DOCD LE1/YR: CPT | Mod: CPTII,S$GLB,, | Performed by: NEUROLOGICAL SURGERY

## 2019-04-30 PROCEDURE — 99214 OFFICE O/P EST MOD 30 MIN: CPT | Mod: S$GLB,,, | Performed by: NEUROLOGICAL SURGERY

## 2019-04-30 PROCEDURE — 3008F BODY MASS INDEX DOCD: CPT | Mod: CPTII,S$GLB,, | Performed by: NEUROLOGICAL SURGERY

## 2019-04-30 PROCEDURE — 3079F PR MOST RECENT DIASTOLIC BLOOD PRESSURE 80-89 MM HG: ICD-10-PCS | Mod: CPTII,S$GLB,, | Performed by: NEUROLOGICAL SURGERY

## 2019-04-30 PROCEDURE — 3079F DIAST BP 80-89 MM HG: CPT | Mod: CPTII,S$GLB,, | Performed by: NEUROLOGICAL SURGERY

## 2019-04-30 PROCEDURE — 3075F PR MOST RECENT SYSTOLIC BLOOD PRESS GE 130-139MM HG: ICD-10-PCS | Mod: CPTII,S$GLB,, | Performed by: NEUROLOGICAL SURGERY

## 2019-04-30 PROCEDURE — 99999 PR PBB SHADOW E&M-EST. PATIENT-LVL III: ICD-10-PCS | Mod: PBBFAC,,, | Performed by: NEUROLOGICAL SURGERY

## 2019-04-30 PROCEDURE — 3075F SYST BP GE 130 - 139MM HG: CPT | Mod: CPTII,S$GLB,, | Performed by: NEUROLOGICAL SURGERY

## 2019-04-30 PROCEDURE — 99214 PR OFFICE/OUTPT VISIT, EST, LEVL IV, 30-39 MIN: ICD-10-PCS | Mod: S$GLB,,, | Performed by: NEUROLOGICAL SURGERY

## 2019-04-30 NOTE — PROGRESS NOTES
Subjective:   I, Sarahi Amador, attest that this documentation has been prepared under the direction and in the presence of Orion Woods MD.     Patient ID: Cathy Reynolds is a 63 y.o. female     Chief Complaint: No chief complaint on file.      HPI  MsRenaldo Reynolds is a pleasant 63 y.o. woman with an acoustic neuroma, treated with Gamma Knife Radiosurgery on 1/25/2019, who presents today for 3 month follow up with MRI brain.  Pt was in her normal state of health until August 2018 when she developed gradual tinnitus in the right ear. She was seen by Otolaryngology in mid October and found to have hearing loss in that ear. 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 Pt was subsequently seen in clinic on 01/16/2019 and her images confirmed a sizable acoustic neuroma.    Pt states she has been doing well. She notes very mild fatigue but has no additional complaints at this time.    Review of Systems   Constitutional: Positive for fatigue. Negative for activity change, fever and unexpected weight change.   HENT: Negative for facial swelling.    Eyes: Negative.    Respiratory: Negative.    Cardiovascular: Negative.    Gastrointestinal: Negative for diarrhea, nausea and vomiting.   Genitourinary: Negative.    Musculoskeletal: Negative for back pain, joint swelling, myalgias and neck pain.   Neurological: Negative for dizziness, weakness, numbness and headaches.   Psychiatric/Behavioral: Negative.       Past Medical History:   Diagnosis Date    AR (allergic rhinitis)     Diverticulosis     Ear pain, right     Elevated LFTs     borderline - due to OTC herbals - resolved    History of colon polyps     Hyperlipidemia     borderline with high HDL    Hypertension     Mild anxiety     Osteoporosis, postmenopausal     Postmenopausal status     Ringing in ear, right     Underweight     Vitamin D deficiency        Objective:      Vitals:    04/30/19 1407    BP: 137/87   Pulse: 93   Temp: 97.6 °F (36.4 °C)      Physical Exam   Constitutional: She is oriented to person, place, and time. She appears well-developed and well-nourished.   HENT:   Head: Normocephalic and atraumatic.   Neck: Neck supple.   Neurological: She is alert and oriented to person, place, and time. No cranial nerve deficit. She displays a negative Romberg sign. GCS eye subscore is 4. GCS verbal subscore is 5. GCS motor subscore is 6.            I, Dr. Orion Woods, personally performed the services described in this documentation. All medical record entries made by the scribe, Sarahi Amador, were at my direction and in my presence.  I have reviewed the chart and agree that the record reflects my personal performance and is accurate and complete. Orion Woods MD. 04/30/2019    Assessment:       Acoustic neuroma.     Plan:     She was informed that she may resume normal levels of activity.  I will schedule the patient for 1 year follow up with MRI brain.

## 2019-05-06 DIAGNOSIS — I10 ESSENTIAL HYPERTENSION: ICD-10-CM

## 2019-05-06 RX ORDER — AMLODIPINE BESYLATE 5 MG/1
TABLET ORAL
Qty: 90 TABLET | Refills: 2 | Status: SHIPPED | OUTPATIENT
Start: 2019-05-06 | End: 2020-01-16 | Stop reason: SINTOL

## 2019-05-06 NOTE — TELEPHONE ENCOUNTER
----- Message from Talon Ashley sent at 5/6/2019  1:46 PM CDT -----  Contact: GAYLE VIDES [[id number]]  Can the clinic reply in [facility]:    Please refill the medication(s) listed below.     Please call the patient when the prescription(s) is ready for  at the phone number   [phone]    Medication #1:amLODIPine (NORVASC) 5 MG tablet    Medication #2:    Preferred Pharmacy:[facility] [address] [place] [address] AT [facility] [phone]

## 2019-06-11 ENCOUNTER — TELEPHONE (OUTPATIENT)
Dept: FAMILY MEDICINE | Facility: CLINIC | Age: 64
End: 2019-06-11

## 2019-06-11 NOTE — TELEPHONE ENCOUNTER
----- Message from Renetta Karimi sent at 6/11/2019  1:19 PM CDT -----  Contact: Self   Type: Lab      Order is not listed in EPIC.  Please enter order and contact patient to schedule.    Name of Caller: Self     Preferred Date and Time of Labs: anytime     Date of 6mth follow up  Appointment: 7/17/19    Where would they like the lab performed? [facility]     Would the patient rather a call back or a response via [facility]?  Call     Best Call Back Number: [phone]

## 2019-07-17 ENCOUNTER — OFFICE VISIT (OUTPATIENT)
Dept: FAMILY MEDICINE | Facility: CLINIC | Age: 64
End: 2019-07-17
Payer: COMMERCIAL

## 2019-07-17 VITALS
BODY MASS INDEX: 17.01 KG/M2 | OXYGEN SATURATION: 97 % | DIASTOLIC BLOOD PRESSURE: 82 MMHG | SYSTOLIC BLOOD PRESSURE: 134 MMHG | HEIGHT: 63 IN | TEMPERATURE: 99 F | HEART RATE: 95 BPM | WEIGHT: 96 LBS

## 2019-07-17 DIAGNOSIS — R63.6 UNDERWEIGHT: ICD-10-CM

## 2019-07-17 DIAGNOSIS — I10 ESSENTIAL HYPERTENSION: ICD-10-CM

## 2019-07-17 DIAGNOSIS — Z11.1 SCREENING-PULMONARY TB: ICD-10-CM

## 2019-07-17 DIAGNOSIS — Z78.0 POSTMENOPAUSAL STATUS: ICD-10-CM

## 2019-07-17 DIAGNOSIS — M81.0 OSTEOPOROSIS, POSTMENOPAUSAL: ICD-10-CM

## 2019-07-17 DIAGNOSIS — E55.9 VITAMIN D DEFICIENCY: ICD-10-CM

## 2019-07-17 DIAGNOSIS — F41.9 MILD ANXIETY: ICD-10-CM

## 2019-07-17 DIAGNOSIS — J30.1 SEASONAL ALLERGIC RHINITIS DUE TO POLLEN: Primary | ICD-10-CM

## 2019-07-17 DIAGNOSIS — Z23 NEED FOR DIPHTHERIA-TETANUS-PERTUSSIS (TDAP) VACCINE: ICD-10-CM

## 2019-07-17 DIAGNOSIS — D33.3 CEREBELLOPONTINE ANGLE TUMOR: ICD-10-CM

## 2019-07-17 PROCEDURE — 99214 OFFICE O/P EST MOD 30 MIN: CPT | Mod: 25,S$GLB,, | Performed by: NURSE PRACTITIONER

## 2019-07-17 PROCEDURE — 90471 TDAP VACCINE GREATER THAN OR EQUAL TO 7YO IM: ICD-10-PCS | Mod: S$GLB,,, | Performed by: NURSE PRACTITIONER

## 2019-07-17 PROCEDURE — 3075F SYST BP GE 130 - 139MM HG: CPT | Mod: CPTII,S$GLB,, | Performed by: NURSE PRACTITIONER

## 2019-07-17 PROCEDURE — 3079F PR MOST RECENT DIASTOLIC BLOOD PRESSURE 80-89 MM HG: ICD-10-PCS | Mod: CPTII,S$GLB,, | Performed by: NURSE PRACTITIONER

## 2019-07-17 PROCEDURE — 90471 IMMUNIZATION ADMIN: CPT | Mod: S$GLB,,, | Performed by: NURSE PRACTITIONER

## 2019-07-17 PROCEDURE — 1101F PR PT FALLS ASSESS DOC 0-1 FALLS W/OUT INJ PAST YR: ICD-10-PCS | Mod: CPTII,S$GLB,, | Performed by: NURSE PRACTITIONER

## 2019-07-17 PROCEDURE — 99999 PR PBB SHADOW E&M-EST. PATIENT-LVL IV: CPT | Mod: PBBFAC,,, | Performed by: NURSE PRACTITIONER

## 2019-07-17 PROCEDURE — 3079F DIAST BP 80-89 MM HG: CPT | Mod: CPTII,S$GLB,, | Performed by: NURSE PRACTITIONER

## 2019-07-17 PROCEDURE — 1101F PT FALLS ASSESS-DOCD LE1/YR: CPT | Mod: CPTII,S$GLB,, | Performed by: NURSE PRACTITIONER

## 2019-07-17 PROCEDURE — 99214 PR OFFICE/OUTPT VISIT, EST, LEVL IV, 30-39 MIN: ICD-10-PCS | Mod: 25,S$GLB,, | Performed by: NURSE PRACTITIONER

## 2019-07-17 PROCEDURE — 99999 PR PBB SHADOW E&M-EST. PATIENT-LVL IV: ICD-10-PCS | Mod: PBBFAC,,, | Performed by: NURSE PRACTITIONER

## 2019-07-17 PROCEDURE — 86580 TB INTRADERMAL TEST: CPT | Mod: S$GLB,,, | Performed by: NURSE PRACTITIONER

## 2019-07-17 PROCEDURE — 3075F PR MOST RECENT SYSTOLIC BLOOD PRESS GE 130-139MM HG: ICD-10-PCS | Mod: CPTII,S$GLB,, | Performed by: NURSE PRACTITIONER

## 2019-07-17 PROCEDURE — 90715 TDAP VACCINE GREATER THAN OR EQUAL TO 7YO IM: ICD-10-PCS | Mod: S$GLB,,, | Performed by: NURSE PRACTITIONER

## 2019-07-17 PROCEDURE — 86580 POCT TB SKIN TEST: ICD-10-PCS | Mod: S$GLB,,, | Performed by: NURSE PRACTITIONER

## 2019-07-17 PROCEDURE — 90715 TDAP VACCINE 7 YRS/> IM: CPT | Mod: S$GLB,,, | Performed by: NURSE PRACTITIONER

## 2019-07-17 NOTE — PROGRESS NOTES
Subjective:       Patient ID: Cathy Reynolds is a 64 y.o. female.    Chief Complaint: Hypertension (F/U) and Tetnus Shot Needed    64-year-old female presents to the clinic today requesting a Tdap and a TB skin test for work.  Her repeat blood pressure is 134/80.  She has good dietary habits.  She does legs exercises about 3 times a week.  She is compliant with all of her medications.  She denies any headaches, dizziness, or blurred vision.  She denies any cardiac chest pain, heart palpitations, shortness breath, or swelling to lower extremities.  She denies any abdominal pain, nausea, vomiting, diarrhea, constipation, or blood in stool.    Past Medical History:   Diagnosis Date    AR (allergic rhinitis)     Diverticulosis     Ear pain, right     Elevated LFTs     borderline - due to OTC herbals - resolved    History of colon polyps     Hyperlipidemia     borderline with high HDL    Hypertension     Mild anxiety     Osteoporosis, postmenopausal     Postmenopausal status     Ringing in ear, right     Underweight     Vitamin D deficiency      Past Surgical History:   Procedure Laterality Date     SECTION, LOW TRANSVERSE      COLONOSCOPY      COLONOSCOPY N/A 2015    Performed by Fausto Shirley MD at [facility] ([address] [place] [address])    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 She has never used smokeless tobacco. She reports that she does not drink alcohol or use drugs.  Review of Systems   Constitutional: Negative for activity change.   Respiratory: Negative for cough, chest tightness, shortness of breath and wheezing.    Cardiovascular: Negative for chest pain, palpitations and leg swelling.   Gastrointestinal: Negative for abdominal pain, blood in stool, constipation, diarrhea, nausea and vomiting.   Musculoskeletal: Negative for gait problem.   Skin: Negative for color change.    Neurological: Negative for dizziness, syncope and light-headedness.       Objective:      Physical Exam   Constitutional: She is oriented to person, place, and time. No distress.   Thin pleasant female in NAD    Eyes: Pupils are equal, round, and reactive to light. Conjunctivae and EOM are normal. Right eye exhibits no discharge. Left eye exhibits no discharge. No scleral icterus.   Neck: Normal range of motion. Neck supple. No JVD present.   Cardiovascular: Normal rate, regular rhythm and normal heart sounds.   No murmur heard.  Pulmonary/Chest: Effort normal and breath sounds normal. No respiratory distress. She has no wheezes. She has no rales.   Abdominal: Soft. Bowel sounds are normal. There is no tenderness.   Musculoskeletal: Normal range of motion. She exhibits no edema.   Neurological: She is alert and oriented to person, place, and time.   Skin: Skin is warm and dry. She is not diaphoretic.   Psychiatric: She has a normal mood and affect.       Assessment:       1. Seasonal allergic rhinitis due to pollen    2. Cerebellopontine angle tumor    3. Essential hypertension    4. Osteoporosis, postmenopausal    5. Mild anxiety    6. Postmenopausal status    7. Underweight    8. Vitamin D deficiency    9. Need for diphtheria-tetanus-pertussis (Tdap) vaccine    10. Screening-pulmonary TB        Plan:         Seasonal allergic rhinitis due to pollen  - Take Claritin as needed     Cerebellopontine angle tumor  - s/p Gammaradiotherapy 1/25/2019    Essential hypertension  - The current medical regimen is effective;  continue present plan and medications.    Osteoporosis, postmenopausal  - The current medical regimen is effective;  continue present plan and medications.    Mild anxiety  - controlled without any medication at this time    Postmenopausal status  - no hormones needed    Underweight  - stable    Vitamin D deficiency  - continue Vitamin D     Need for diphtheria-tetanus-pertussis (Tdap) vaccine  -      Tdap Vaccine    Screening-pulmonary TB  -     POCT TB Skin Test Read

## 2019-07-17 NOTE — PATIENT INSTRUCTIONS
Tdap given today in the office   TB skin test today  Return 48-72 hours to have test read   Discuss shingles vaccine with pharmacy  Continue all current medications

## 2019-07-17 NOTE — PROGRESS NOTES
Patient tolerated injections well, immunization record provided.  Instructed to remain in lobby for 15 minutes and to report any adverse reactions right away.  Instructed to return to clinic to have the test read not before 48 hours but not after 72 hours of placement or the test would have to be repeated.  Verbalized understanding and scheduled appointment for PPD reading.

## 2019-07-19 ENCOUNTER — CLINICAL SUPPORT (OUTPATIENT)
Dept: FAMILY MEDICINE | Facility: CLINIC | Age: 64
End: 2019-07-19
Payer: COMMERCIAL

## 2019-07-19 DIAGNOSIS — Z11.1 SCREENING-PULMONARY TB: Primary | ICD-10-CM

## 2019-07-19 PROCEDURE — 99499 UNLISTED E&M SERVICE: CPT | Mod: S$GLB,,, | Performed by: INTERNAL MEDICINE

## 2019-07-19 PROCEDURE — 99499 NO LOS: ICD-10-PCS | Mod: S$GLB,,, | Performed by: INTERNAL MEDICINE

## 2019-09-30 ENCOUNTER — TELEPHONE (OUTPATIENT)
Dept: FAMILY MEDICINE | Facility: CLINIC | Age: 64
End: 2019-09-30

## 2019-09-30 DIAGNOSIS — I10 ESSENTIAL HYPERTENSION: Primary | ICD-10-CM

## 2019-09-30 NOTE — TELEPHONE ENCOUNTER
----- Message from Renetta Karimi sent at 9/30/2019  8:14 AM CDT -----  Contact: Self   Type: Lab    Caller is requesting to schedule their Lab appointment prior to annual appointment.    Order is not listed in EPIC.  Please enter order and contact patient to schedule.    Name of Caller: Self     Preferred Date and Time of Labs: anytime     Date of EPP Appointment:3/4/20    Where would they like the lab performed? [facility] lab     Would the patient rather a call back or a response via [facility]?  Call     Best Call Back Number:[phone]

## 2019-12-10 DIAGNOSIS — J30.1 ACUTE SEASONAL ALLERGIC RHINITIS DUE TO POLLEN: ICD-10-CM

## 2019-12-10 RX ORDER — FLUTICASONE PROPIONATE 50 MCG
SPRAY, SUSPENSION (ML) NASAL
Qty: 16 G | Refills: 2 | Status: SHIPPED | OUTPATIENT
Start: 2019-12-10 | End: 2020-08-19

## 2020-01-15 ENCOUNTER — TELEPHONE (OUTPATIENT)
Dept: FAMILY MEDICINE | Facility: CLINIC | Age: 65
End: 2020-01-15

## 2020-01-15 NOTE — TELEPHONE ENCOUNTER
"Spoke with patient has some concerns regarding blood pressure medication Amlodipine. Since Saturday, says she has experienced muscle movements, stiffness in her lip, sometimes she wakes up she feels "hot and cold". This all starts about 1 o'clock in the morning. She also has experienced SOB. Patient states she has not taken blood pressure medication this morning and she feels much better. Reports reading of 123/77. Please advise   "

## 2020-01-15 NOTE — TELEPHONE ENCOUNTER
----- Message from Thania Swann sent at 1/15/2020  4:07 PM CST -----  Contact: Patient ph [phone]  Type: Patient Call Back    Who called: Patient    What is the request in detail: Would like to talk to nurse or dr about her blood pressure medication. Please call.     Would the patient rather a call back or a response via [facility]? Call back    Best call back number: [phone]

## 2020-01-15 NOTE — TELEPHONE ENCOUNTER
She has been on amlodipine for about 3 years. Her symptoms are unusual as side effects for this medication. Recommend office visit to discuss.

## 2020-01-16 ENCOUNTER — OFFICE VISIT (OUTPATIENT)
Dept: FAMILY MEDICINE | Facility: CLINIC | Age: 65
End: 2020-01-16
Payer: COMMERCIAL

## 2020-01-16 VITALS
DIASTOLIC BLOOD PRESSURE: 82 MMHG | BODY MASS INDEX: 16.52 KG/M2 | HEART RATE: 92 BPM | HEIGHT: 63 IN | OXYGEN SATURATION: 99 % | SYSTOLIC BLOOD PRESSURE: 154 MMHG | WEIGHT: 93.25 LBS | TEMPERATURE: 98 F

## 2020-01-16 DIAGNOSIS — Z12.39 SCREENING FOR BREAST CANCER: Primary | ICD-10-CM

## 2020-01-16 DIAGNOSIS — Z23 NEED FOR IMMUNIZATION AGAINST INFLUENZA: ICD-10-CM

## 2020-01-16 DIAGNOSIS — E55.9 VITAMIN D DEFICIENCY: ICD-10-CM

## 2020-01-16 DIAGNOSIS — I10 ESSENTIAL HYPERTENSION: ICD-10-CM

## 2020-01-16 DIAGNOSIS — M81.0 OSTEOPOROSIS, POSTMENOPAUSAL: ICD-10-CM

## 2020-01-16 PROCEDURE — 3079F DIAST BP 80-89 MM HG: CPT | Mod: CPTII,S$GLB,, | Performed by: NURSE PRACTITIONER

## 2020-01-16 PROCEDURE — 99214 OFFICE O/P EST MOD 30 MIN: CPT | Mod: 25,S$GLB,, | Performed by: NURSE PRACTITIONER

## 2020-01-16 PROCEDURE — 90471 FLU VACCINE - HIGH DOSE (65+) PRESERVATIVE FREE IM: ICD-10-PCS | Mod: S$GLB,,, | Performed by: NURSE PRACTITIONER

## 2020-01-16 PROCEDURE — 99214 PR OFFICE/OUTPT VISIT, EST, LEVL IV, 30-39 MIN: ICD-10-PCS | Mod: 25,S$GLB,, | Performed by: NURSE PRACTITIONER

## 2020-01-16 PROCEDURE — 99999 PR PBB SHADOW E&M-EST. PATIENT-LVL IV: CPT | Mod: PBBFAC,,, | Performed by: NURSE PRACTITIONER

## 2020-01-16 PROCEDURE — 1126F AMNT PAIN NOTED NONE PRSNT: CPT | Mod: S$GLB,,, | Performed by: NURSE PRACTITIONER

## 2020-01-16 PROCEDURE — 90471 IMMUNIZATION ADMIN: CPT | Mod: S$GLB,,, | Performed by: NURSE PRACTITIONER

## 2020-01-16 PROCEDURE — 90662 FLU VACCINE - HIGH DOSE (65+) PRESERVATIVE FREE IM: ICD-10-PCS | Mod: S$GLB,,, | Performed by: NURSE PRACTITIONER

## 2020-01-16 PROCEDURE — 1126F PR PAIN SEVERITY QUANTIFIED, NO PAIN PRESENT: ICD-10-PCS | Mod: S$GLB,,, | Performed by: NURSE PRACTITIONER

## 2020-01-16 PROCEDURE — 3077F SYST BP >= 140 MM HG: CPT | Mod: CPTII,S$GLB,, | Performed by: NURSE PRACTITIONER

## 2020-01-16 PROCEDURE — 1101F PR PT FALLS ASSESS DOC 0-1 FALLS W/OUT INJ PAST YR: ICD-10-PCS | Mod: CPTII,S$GLB,, | Performed by: NURSE PRACTITIONER

## 2020-01-16 PROCEDURE — 1159F MED LIST DOCD IN RCRD: CPT | Mod: S$GLB,,, | Performed by: NURSE PRACTITIONER

## 2020-01-16 PROCEDURE — 3077F PR MOST RECENT SYSTOLIC BLOOD PRESSURE >= 140 MM HG: ICD-10-PCS | Mod: CPTII,S$GLB,, | Performed by: NURSE PRACTITIONER

## 2020-01-16 PROCEDURE — 1159F PR MEDICATION LIST DOCUMENTED IN MEDICAL RECORD: ICD-10-PCS | Mod: S$GLB,,, | Performed by: NURSE PRACTITIONER

## 2020-01-16 PROCEDURE — 1101F PT FALLS ASSESS-DOCD LE1/YR: CPT | Mod: CPTII,S$GLB,, | Performed by: NURSE PRACTITIONER

## 2020-01-16 PROCEDURE — 99999 PR PBB SHADOW E&M-EST. PATIENT-LVL IV: ICD-10-PCS | Mod: PBBFAC,,, | Performed by: NURSE PRACTITIONER

## 2020-01-16 PROCEDURE — 90662 IIV NO PRSV INCREASED AG IM: CPT | Mod: S$GLB,,, | Performed by: NURSE PRACTITIONER

## 2020-01-16 PROCEDURE — 3079F PR MOST RECENT DIASTOLIC BLOOD PRESSURE 80-89 MM HG: ICD-10-PCS | Mod: CPTII,S$GLB,, | Performed by: NURSE PRACTITIONER

## 2020-01-16 RX ORDER — DILTIAZEM HYDROCHLORIDE 120 MG/1
120 CAPSULE, COATED, EXTENDED RELEASE ORAL DAILY
Qty: 90 CAPSULE | Refills: 3 | Status: SHIPPED | OUTPATIENT
Start: 2020-01-16 | End: 2020-03-04 | Stop reason: SINTOL

## 2020-01-16 NOTE — PROGRESS NOTES
Subjective:       Patient ID: Cathy Reynolds is a 64 y.o. female.    Chief Complaint: Hypertension (Discuss Norvasc)    64-year-old female presents to the clinic today with complaint of 2 days ago she had muscle twitching all over her body and her lips felt stiff.  She states she stopped the Norvasc and her symptoms resolved.  She denies any headaches, dizziness, or blurred vision.  She denies any cardiac chest pain, heart palpitations, shortness breath, or swelling to lower extremities.  She has good dietary habits.  She does stretching exercises 3 to 4 times a week and is very active at work.  She quit taking the Norvasc 2 days ago. She takes all of her other medications as prescribed.     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 She has never used smokeless tobacco. She reports that she does not drink alcohol or use drugs.  Review of Systems   Respiratory: Negative for cough, shortness of breath and wheezing.    Cardiovascular: Negative for chest pain, palpitations and leg swelling.   Gastrointestinal: Negative for abdominal pain, diarrhea, nausea and vomiting.   Musculoskeletal: Negative for gait problem.   Neurological: Negative for dizziness, light-headedness and headaches.       Objective:      Physical Exam   Constitutional: She is oriented to person, place, and time.  No distress.   Thin pleasant female in NAD    Eyes: Pupils are equal, round, and reactive to light. Conjunctivae and EOM are normal. Right eye exhibits no discharge. Left eye exhibits no discharge. No scleral icterus.   Neck: Normal range of motion. Neck supple. No JVD present.   Cardiovascular: Normal rate, regular rhythm and normal heart sounds.   No murmur heard.  Pulmonary/Chest: Effort normal and breath sounds normal. No respiratory distress. She has no wheezes. She has no rales.   Abdominal: Soft. Bowel sounds are normal. There is no tenderness.   Musculoskeletal: Normal range of motion. She exhibits no edema.   Neurological: She is alert and oriented to person, place, and time.   Skin: Skin is warm and dry. She is not diaphoretic.   Psychiatric: She has a normal mood and affect.       Assessment:       1. Screening for breast cancer    2. Essential hypertension    3. Screening mammogram, encounter for    4. Osteoporosis, postmenopausal    5. Vitamin D deficiency    6. Need for immunization against influenza        Plan:         Screening for breast cancer  -     Mammo Digital Screening Bilat w/ Fabien; Future; Expected date: 01/16/2020    Essential hypertension  -     diltiaZEM (CARDIZEM CD) 120 MG Cp24; Take 1 capsule (120 mg total) by mouth once daily.  Dispense: 90 capsule; Refill: 3  - stopped Norvasc 2 days ago will start Cardizem 120 CP24 one daily    Osteoporosis, postmenopausal  - The current medical regimen is effective;  continue present plan and medications.    Vitamin D deficiency  - The current medical regimen is effective;  continue present plan and medications.    Need for immunization against influenza  -     Influenza - High Dose (65+) (PF) (IM)

## 2020-01-16 NOTE — TELEPHONE ENCOUNTER
She has been on amlodipine for about 3 years. Her symptoms are unusual as side effects for this medication. Recommend office visit to discuss.  Scheduled an appt for today with NP.  Patient verbalized understandings.

## 2020-01-16 NOTE — PATIENT INSTRUCTIONS
Continue to stay off of Norvasc  Start Cardizem 120 mg 24 tablet one daily   Schedule mammogram   Follow up with Dr. Elizondo 3/4/2020 as scheduled

## 2020-02-08 DIAGNOSIS — I10 ESSENTIAL HYPERTENSION: ICD-10-CM

## 2020-02-08 RX ORDER — AMLODIPINE BESYLATE 5 MG/1
TABLET ORAL
Qty: 90 TABLET | Refills: 0 | Status: SHIPPED | OUTPATIENT
Start: 2020-02-08 | End: 2020-03-04 | Stop reason: SINTOL

## 2020-02-19 ENCOUNTER — PATIENT OUTREACH (OUTPATIENT)
Dept: ADMINISTRATIVE | Facility: HOSPITAL | Age: 65
End: 2020-02-19

## 2020-02-26 ENCOUNTER — TELEPHONE (OUTPATIENT)
Dept: FAMILY MEDICINE | Facility: CLINIC | Age: 65
End: 2020-02-26

## 2020-02-26 NOTE — TELEPHONE ENCOUNTER
----- Message from Hyun Walsh sent at 2/26/2020  8:08 AM CST -----  Type: Patient Call Back    Who called: pt     What is the request in detail: pt states she has broken out in a rash from BP meds she was recently prescribed and did not take it this morning. She is asking for a call back regarding.     Can the clinic reply by [facility]? No     Would the patient rather a call back or a response via [facility]? Call back     Best call back number: [phone]    Additional Information:

## 2020-02-26 NOTE — TELEPHONE ENCOUNTER
I spoke with the patient and she saw she has a small area that was discolored but is better now so she is going to start it back tonight since she does think she is having a allergic reaction. She denies any hives, swelling of lips or tongue, and no sob or wheezing.

## 2020-02-26 NOTE — TELEPHONE ENCOUNTER
----- Message from Hyun Walsh sent at 2/26/2020  8:08 AM CST -----  Type: Patient Call Back    Who called: pt     What is the request in detail: pt states she has broken out in a rash from BP meds she was recently prescribed and did not take it this morning. She is asking for a call back regarding.     Can the clinic reply by [facility]? No     Would the patient rather a call back or a response via [facility]? Call back     Best call back number: [phone]    Additional Information:

## 2020-02-29 ENCOUNTER — LAB VISIT (OUTPATIENT)
Dept: LAB | Facility: HOSPITAL | Age: 65
End: 2020-02-29
Attending: INTERNAL MEDICINE
Payer: COMMERCIAL

## 2020-02-29 DIAGNOSIS — I10 ESSENTIAL HYPERTENSION: ICD-10-CM

## 2020-02-29 LAB
ALBUMIN SERPL BCP-MCNC: 4.2 G/DL (ref 3.5–5.2)
ALP SERPL-CCNC: 49 U/L (ref 55–135)
ALT SERPL W/O P-5'-P-CCNC: 22 U/L (ref 10–44)
ANION GAP SERPL CALC-SCNC: 10 MMOL/L (ref 8–16)
AST SERPL-CCNC: 27 U/L (ref 10–40)
BASOPHILS # BLD AUTO: 0.04 K/UL (ref 0–0.2)
BASOPHILS NFR BLD: 0.6 % (ref 0–1.9)
BILIRUB SERPL-MCNC: 0.4 MG/DL (ref 0.1–1)
BUN SERPL-MCNC: 17 MG/DL (ref 8–23)
CALCIUM SERPL-MCNC: 9.8 MG/DL (ref 8.7–10.5)
CHLORIDE SERPL-SCNC: 103 MMOL/L (ref 95–110)
CHOLEST SERPL-MCNC: 202 MG/DL (ref 120–199)
CHOLEST/HDLC SERPL: 2.4 {RATIO} (ref 2–5)
CO2 SERPL-SCNC: 29 MMOL/L (ref 23–29)
CREAT SERPL-MCNC: 0.8 MG/DL (ref 0.5–1.4)
DIFFERENTIAL METHOD: ABNORMAL
EOSINOPHIL # BLD AUTO: 0 K/UL (ref 0–0.5)
EOSINOPHIL NFR BLD: 0.3 % (ref 0–8)
ERYTHROCYTE [DISTWIDTH] IN BLOOD BY AUTOMATED COUNT: 13.3 % (ref 11.5–14.5)
EST. GFR  (AFRICAN AMERICAN): >60 ML/MIN/1.73 M^2
EST. GFR  (NON AFRICAN AMERICAN): >60 ML/MIN/1.73 M^2
GLUCOSE SERPL-MCNC: 88 MG/DL (ref 70–110)
HCT VFR BLD AUTO: 44.3 % (ref 37–48.5)
HDLC SERPL-MCNC: 85 MG/DL (ref 40–75)
HDLC SERPL: 42.1 % (ref 20–50)
HGB BLD-MCNC: 12.6 G/DL (ref 12–16)
IMM GRANULOCYTES # BLD AUTO: 0.01 K/UL (ref 0–0.04)
IMM GRANULOCYTES NFR BLD AUTO: 0.2 % (ref 0–0.5)
LDLC SERPL CALC-MCNC: 108.6 MG/DL (ref 63–159)
LYMPHOCYTES # BLD AUTO: 2.5 K/UL (ref 1–4.8)
LYMPHOCYTES NFR BLD: 37.9 % (ref 18–48)
MCH RBC QN AUTO: 25.8 PG (ref 27–31)
MCHC RBC AUTO-ENTMCNC: 28.4 G/DL (ref 32–36)
MCV RBC AUTO: 91 FL (ref 82–98)
MONOCYTES # BLD AUTO: 0.5 K/UL (ref 0.3–1)
MONOCYTES NFR BLD: 8.1 % (ref 4–15)
NEUTROPHILS # BLD AUTO: 3.5 K/UL (ref 1.8–7.7)
NEUTROPHILS NFR BLD: 52.9 % (ref 38–73)
NONHDLC SERPL-MCNC: 117 MG/DL
NRBC BLD-RTO: 0 /100 WBC
PLATELET # BLD AUTO: 225 K/UL (ref 150–350)
PMV BLD AUTO: 11.8 FL (ref 9.2–12.9)
POTASSIUM SERPL-SCNC: 5 MMOL/L (ref 3.5–5.1)
PROT SERPL-MCNC: 7.4 G/DL (ref 6–8.4)
RBC # BLD AUTO: 4.89 M/UL (ref 4–5.4)
SODIUM SERPL-SCNC: 142 MMOL/L (ref 136–145)
TRIGL SERPL-MCNC: 42 MG/DL (ref 30–150)
WBC # BLD AUTO: 6.63 K/UL (ref 3.9–12.7)

## 2020-02-29 PROCEDURE — 36415 COLL VENOUS BLD VENIPUNCTURE: CPT | Mod: PO

## 2020-02-29 PROCEDURE — 80061 LIPID PANEL: CPT

## 2020-02-29 PROCEDURE — 85025 COMPLETE CBC W/AUTO DIFF WBC: CPT

## 2020-02-29 PROCEDURE — 80053 COMPREHEN METABOLIC PANEL: CPT

## 2020-03-01 DIAGNOSIS — M81.0 OSTEOPOROSIS, POSTMENOPAUSAL: ICD-10-CM

## 2020-03-01 RX ORDER — ALENDRONATE SODIUM 70 MG/1
TABLET ORAL
Qty: 12 TABLET | Refills: 0 | Status: SHIPPED | OUTPATIENT
Start: 2020-03-01 | End: 2020-05-28

## 2020-03-03 PROBLEM — Z87.898 H/O BRAIN TUMOR: Status: ACTIVE | Noted: 2019-03-04

## 2020-03-04 ENCOUNTER — OFFICE VISIT (OUTPATIENT)
Dept: FAMILY MEDICINE | Facility: CLINIC | Age: 65
End: 2020-03-04
Payer: COMMERCIAL

## 2020-03-04 VITALS
WEIGHT: 95 LBS | DIASTOLIC BLOOD PRESSURE: 80 MMHG | BODY MASS INDEX: 16.83 KG/M2 | OXYGEN SATURATION: 98 % | HEIGHT: 63 IN | SYSTOLIC BLOOD PRESSURE: 146 MMHG | TEMPERATURE: 98 F | HEART RATE: 78 BPM

## 2020-03-04 DIAGNOSIS — I10 ESSENTIAL HYPERTENSION: ICD-10-CM

## 2020-03-04 DIAGNOSIS — R63.6 UNDERWEIGHT: ICD-10-CM

## 2020-03-04 DIAGNOSIS — Z87.898 H/O BRAIN TUMOR: ICD-10-CM

## 2020-03-04 DIAGNOSIS — Z71.89 ADVANCED DIRECTIVES, COUNSELING/DISCUSSION: ICD-10-CM

## 2020-03-04 DIAGNOSIS — M81.0 OSTEOPOROSIS, POSTMENOPAUSAL: Primary | ICD-10-CM

## 2020-03-04 DIAGNOSIS — Z23 NEED FOR 23-POLYVALENT PNEUMOCOCCAL POLYSACCHARIDE VACCINE: ICD-10-CM

## 2020-03-04 DIAGNOSIS — E55.9 VITAMIN D DEFICIENCY: ICD-10-CM

## 2020-03-04 DIAGNOSIS — E34.9 ENDOCRINE DISORDER: ICD-10-CM

## 2020-03-04 DIAGNOSIS — Z23 NEED FOR SHINGLES VACCINE: ICD-10-CM

## 2020-03-04 DIAGNOSIS — Z00.00 ROUTINE MEDICAL EXAM: ICD-10-CM

## 2020-03-04 PROCEDURE — 90471 IMMUNIZATION ADMIN: CPT | Mod: S$GLB,,, | Performed by: INTERNAL MEDICINE

## 2020-03-04 PROCEDURE — 3079F DIAST BP 80-89 MM HG: CPT | Mod: CPTII,S$GLB,, | Performed by: INTERNAL MEDICINE

## 2020-03-04 PROCEDURE — 3079F PR MOST RECENT DIASTOLIC BLOOD PRESSURE 80-89 MM HG: ICD-10-PCS | Mod: CPTII,S$GLB,, | Performed by: INTERNAL MEDICINE

## 2020-03-04 PROCEDURE — 99397 PR PREVENTIVE VISIT,EST,65 & OVER: ICD-10-PCS | Mod: 25,S$GLB,, | Performed by: INTERNAL MEDICINE

## 2020-03-04 PROCEDURE — 90732 PNEUMOCOCCAL POLYSACCHARIDE VACCINE 23-VALENT =>2YO SQ IM: ICD-10-PCS | Mod: S$GLB,,, | Performed by: INTERNAL MEDICINE

## 2020-03-04 PROCEDURE — 3077F SYST BP >= 140 MM HG: CPT | Mod: CPTII,S$GLB,, | Performed by: INTERNAL MEDICINE

## 2020-03-04 PROCEDURE — 90732 PPSV23 VACC 2 YRS+ SUBQ/IM: CPT | Mod: S$GLB,,, | Performed by: INTERNAL MEDICINE

## 2020-03-04 PROCEDURE — 3077F PR MOST RECENT SYSTOLIC BLOOD PRESSURE >= 140 MM HG: ICD-10-PCS | Mod: CPTII,S$GLB,, | Performed by: INTERNAL MEDICINE

## 2020-03-04 PROCEDURE — 99999 PR PBB SHADOW E&M-EST. PATIENT-LVL III: ICD-10-PCS | Mod: PBBFAC,,, | Performed by: INTERNAL MEDICINE

## 2020-03-04 PROCEDURE — 90471 PNEUMOCOCCAL POLYSACCHARIDE VACCINE 23-VALENT =>2YO SQ IM: ICD-10-PCS | Mod: S$GLB,,, | Performed by: INTERNAL MEDICINE

## 2020-03-04 PROCEDURE — 99397 PER PM REEVAL EST PAT 65+ YR: CPT | Mod: 25,S$GLB,, | Performed by: INTERNAL MEDICINE

## 2020-03-04 PROCEDURE — 99999 PR PBB SHADOW E&M-EST. PATIENT-LVL III: CPT | Mod: PBBFAC,,, | Performed by: INTERNAL MEDICINE

## 2020-03-04 RX ORDER — OLMESARTAN MEDOXOMIL 5 MG/1
5 TABLET ORAL DAILY
Qty: 90 TABLET | Refills: 1 | Status: SHIPPED | OUTPATIENT
Start: 2020-03-04 | End: 2020-08-19

## 2020-03-04 NOTE — PROGRESS NOTES
HISTORY OF PRESENT ILLNESS:  Cathy Reynolds is a 64 y.o. female who presents to the clinic today for a routine physical exam. Her last physical exam was approximately 1 years(s) ago.        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    MYOMECTOMY      polyp removal from cervix         SOCIAL HISTORY:  Social History     Socioeconomic History    Marital status:      Spouse name: Not on file    Number of children: 2    Years of education: Not on file    Highest education level: Not on file   Occupational History    Not on file   Social Needs    Financial resource strain: Not on file    Food insecurity:     Worry: Not on file     Inability: Not on file    Transportation needs:     Medical: Not on file     Non-medical: Not on file   Tobacco Use    Smoking status: Never Smoker    Smokeless tobacco: Never Used   Substance and Sexual Activity    Alcohol use: No    Drug use: No    Sexual activity: Yes     Partners: Male     Birth control/protection: None   Lifestyle    Physical activity:     Days per week: Not on file     Minutes per session: Not on file    Stress: Not on file   Relationships    Social connections:     Talks on phone: Not on file     Gets together: Not on file     Attends Scientologist service: Not on file     Active member of club or organization: Not on file     Attends meetings of  clubs or organizations: Not on file     Relationship status: Not on file   Other Topics Concern    Not on file   Social History Narrative    Teacher at [employer]           FAMILY HISTORY:  Family History   Problem Relation Age of Onset    Hypertension Mother     Hypertension Brother     Hypertension Sister     Diabetes Sister     Breast cancer Maternal Aunt     Colon cancer Neg Hx     Ovarian cancer Neg Hx        ALLERGIES AND MEDICATIONS: updated and reviewed.  Review of patient's allergies indicates:   Allergen Reactions    Diltiazem Other (See Comments)     - rash, lip numb, weak    Ace inhibitors      Other reaction(s): cough    Norvasc [amlodipine]      MUSCLE TWITCHING     Penicillins Hives     Medication List with Changes/Refills   New Medications    OLMESARTAN (BENICAR) 5 MG TAB    Take 1 tablet (5 mg total) by mouth once daily.   Current Medications    ALENDRONATE (FOSAMAX) 70 MG TABLET    TAKE 1 TABLET BY MOUTH ONCE A WEEK **EVERY  7  DAY**    CHOLECALCIFEROL, VITAMIN D3, (VITAMIN D3) 2,000 UNIT CAP    Take 2 capsules by mouth once daily.     FLUTICASONE PROPIONATE (FLONASE) 50 MCG/ACTUATION NASAL SPRAY    USE 1 SPRAY IN EACH NOSTRIL TWICE A DAY    OLOPATADINE (PATANOL) 0.1 % OPHTHALMIC SOLUTION    INT 1 GTT ON OU BID   Discontinued Medications    AMLODIPINE (NORVASC) 5 MG TABLET    TAKE 1 TABLET BY MOUTH EVERY DAY    DILTIAZEM (CARDIZEM CD) 120 MG CP24    Take 1 capsule (120 mg total) by mouth once daily.          CARE TEAM:  Patient Care Team:  Masha Elizondo MD as PCP - General (Internal Medicine)  Kezia Acuña LPN as Licensed Practical Nurse           SCREENING HISTORY:  Health Maintenance       Date Due Completion Date    Shingles Vaccine (1 of 2) 07/05/2003 ---    Pneumococcal Vaccine (65+ Low/Medium Risk) (2 of 2 - PPSV23) 03/04/2020 3/4/2019    DEXA SCAN 03/05/2020 3/5/2018    Override on 8/11/2011: Done    Override on 8/11/2011: Done    Mammogram 03/06/2020 3/6/2019  "   Override on 2/22/2016: Done    Colonoscopy 05/08/2020 5/8/2015    Override on 12/27/2006: Done    Lipid Panel 02/28/2025 2/29/2020    TETANUS VACCINE 07/17/2029 7/17/2019            REVIEW OF SYSTEMS:   The patient reports: good dietary habits.  The patient reports : that they do not exercise, but stay active.  Review of Systems   Constitutional: Negative for chills, fatigue, fever and unexpected weight change.   HENT: Negative for congestion and postnasal drip.    Eyes: Negative for pain and visual disturbance.   Respiratory: Negative for cough, shortness of breath and wheezing.    Cardiovascular: Negative for chest pain, palpitations and leg swelling.   Gastrointestinal: Negative for abdominal pain, constipation, diarrhea, nausea and vomiting.   Genitourinary: Negative for dysuria.   Musculoskeletal: Negative for arthralgias and back pain.   Skin: Positive for rash (- ?due to new BP medication; using dial soap and oilive oil/tea tree oil).   Neurological: Negative for weakness and headaches.   Psychiatric/Behavioral: Negative for dysphoric mood and sleep disturbance. The patient is not nervous/anxious.       ROS (Optional)-: no pelvic pain  Breast ROS (Optional)-: negative for breast lumps/discharge            Physical Examination:   Vitals:    03/04/20 1321   BP: (!) 146/80   Pulse: 78   Temp: 98.1 °F (36.7 °C)     Weight: 43.1 kg (95 lb 0.3 oz)   Height: 5' 3" (160 cm)   Body mass index is 16.83 kg/m².      Patient did not require to have a chaperone present during the exam today.    General appearance - alert, well appearing, and in no distress, thin  Psychiatric - alert, oriented to person, place, and time, normal behavior, speech, dress, motor activity, mildly anxious (baseline)  Eyes - pupils equal and reactive, extraocular eye movements intact, sclera anicteric  Ears - bilateral TM's and external ear canals normal, scant cerumen noted - bilateral  Mouth - mucous membranes moist, pharynx normal without " lesions  Neck - supple, no significant adenopathy, carotids upstroke normal bilaterally, no bruits, thyroid exam: thyroid is normal in size without nodules or tenderness  Lymphatics - no palpable cervical lymphadenopathy  Chest - clear to auscultation, no wheezes, rales or rhonchi, symmetric air entry  Heart - normal rate and regular rhythm, no gallops noted  Abdomen - soft, nontender, nondistended, no masses or organomegaly  Breasts - not examined  Back exam - full range of motion, no tenderness, palpable spasm or pain on motion  Neurological - alert, normal speech, no focal findings or movement disorder noted, cranial nerves II through XII intact  Musculoskeletal - no joint tenderness, deformity or swelling, no muscular tenderness noted  Extremities - peripheral pulses normal, no pedal edema, no clubbing or cyanosis  Skin - normal coloration, no suspicious skin lesions, she had an oval area of irritation on her right anterior neck region      Labs:  Results for orders placed or performed in visit on 02/29/20   CBC auto differential   Result Value Ref Range    WBC 6.63 3.90 - 12.70 K/uL    RBC 4.89 4.00 - 5.40 M/uL    Hemoglobin 12.6 12.0 - 16.0 g/dL    Hematocrit 44.3 37.0 - 48.5 %    Mean Corpuscular Volume 91 82 - 98 fL    Mean Corpuscular Hemoglobin 25.8 (L) 27.0 - 31.0 pg    Mean Corpuscular Hemoglobin Conc 28.4 (L) 32.0 - 36.0 g/dL    RDW 13.3 11.5 - 14.5 %    Platelets 225 150 - 350 K/uL    MPV 11.8 9.2 - 12.9 fL    Immature Granulocytes 0.2 0.0 - 0.5 %    Gran # (ANC) 3.5 1.8 - 7.7 K/uL    Immature Grans (Abs) 0.01 0.00 - 0.04 K/uL    Lymph # 2.5 1.0 - 4.8 K/uL    Mono # 0.5 0.3 - 1.0 K/uL    Eos # 0.0 0.0 - 0.5 K/uL    Baso # 0.04 0.00 - 0.20 K/uL    nRBC 0 0 /100 WBC    Gran% 52.9 38.0 - 73.0 %    Lymph% 37.9 18.0 - 48.0 %    Mono% 8.1 4.0 - 15.0 %    Eosinophil% 0.3 0.0 - 8.0 %    Basophil% 0.6 0.0 - 1.9 %    Differential Method Automated    Comprehensive metabolic panel   Result Value Ref Range     Sodium 142 136 - 145 mmol/L    Potassium 5.0 3.5 - 5.1 mmol/L    Chloride 103 95 - 110 mmol/L    CO2 29 23 - 29 mmol/L    Glucose 88 70 - 110 mg/dL    BUN, Bld 17 8 - 23 mg/dL    Creatinine 0.8 0.5 - 1.4 mg/dL    Calcium 9.8 8.7 - 10.5 mg/dL    Total Protein 7.4 6.0 - 8.4 g/dL    Albumin 4.2 3.5 - 5.2 g/dL    Total Bilirubin 0.4 0.1 - 1.0 mg/dL    Alkaline Phosphatase 49 (L) 55 - 135 U/L    AST 27 10 - 40 U/L    ALT 22 10 - 44 U/L    Anion Gap 10 8 - 16 mmol/L    eGFR if African American >60.0 >60 mL/min/1.73 m^2    eGFR if non African American >60.0 >60 mL/min/1.73 m^2   Lipid panel   Result Value Ref Range    Cholesterol 202 (H) 120 - 199 mg/dL    Triglycerides 42 30 - 150 mg/dL    HDL 85 (H) 40 - 75 mg/dL    LDL Cholesterol 108.6 63.0 - 159.0 mg/dL    Hdl/Cholesterol Ratio 42.1 20.0 - 50.0 %    Total Cholesterol/HDL Ratio 2.4 2.0 - 5.0    Non-HDL Cholesterol 117 mg/dL          ASSESSMENT AND PLAN:  1. Routine medical exam  Counseled on age appropriate medical preventative services including age appropriate cancer screenings, age appropriate eye and dental exams, over all nutritional health, need for a consistent exercise regimen, and an over all push towards maintaining a vigorous and active lifestyle.  Counseled on age appropriate vaccines and discussed upcoming health care needs based on age/gender. Discussed good sleep hygiene and stress management.  Pneumovax 23 completed today    2. Essential hypertension  Blood pressure is not controlled today. We discussed low salt diet and regular exercise. Patient reports that they have not taken any decongestant or anti-inflammatory medication recently (patient educated that these medications can increase blood pressure).  Medication changes made today: She reports side effects to diltiazem and amlodipine.  I will try her on low-dose Benicar.  . Patient will come back in 2-4 weeks for recheck of blood pressure by nursing staff. Patient also asked to check blood pressure  at home and bring recordings to next office visit. Patient is not eligible to enroll at this time in the digital hypertension program at this time.   I advised her that she may be fatigue when starting on medication as her body will need to get used to the lower blood pressure.  - olmesartan (BENICAR) 5 MG Tab; Take 1 tablet (5 mg total) by mouth once daily.  Dispense: 90 tablet; Refill: 1    3. H/O brain tumor  Stable. Asymptomatic. Observe.    4. Osteoporosis, postmenopausal/5. Vitamin D deficiency  We discussed adequate calcium and vitamin D supplementation. We discussed fall precautions. She is scheduled for her BMD. Continue current treatment regimen (fosamax)  - DXA Bone Density Spine And Hip; Future    6. Underweight  Stable. Asymptomatic. Observe.    7. Advanced directives, counseling/discussion  Advance Care Planning   I initiated the process and explained the importance of advance care planning today with the patient.  Advanced directives were discussed due to patient's age and/or chronic medical conditions. Prognosis based on current condition: good.   Paperwork was given to complete living will (at this point in time, the patient does have full decision-making capacity.  We discussed different extreme health states that she could experience, and reviewed what kind of medical care she would want in those situations) and medical POA (The patient received detailed information about the importance of designating a Health Care Power of . She was also instructed to communicate with this person about their wishes for future healthcare, should she become sick and lose decision-making capacity).   [place] was not discussed.   Approximately 2 minute(s) were spent on counseling/discussion regarding end of life decision making.           8. Need for shingles vaccine  Deferred.    9. Endocrine disorder  BMD ordered/scheduled.          Follow up in about 1 year (around 3/4/2021), or if symptoms worsen or fail  to improve, for annual exam. or sooner as needed.

## 2020-03-10 ENCOUNTER — HOSPITAL ENCOUNTER (OUTPATIENT)
Dept: RADIOLOGY | Facility: HOSPITAL | Age: 65
Discharge: HOME | End: 2020-03-10
Attending: NURSE PRACTITIONER
Payer: COMMERCIAL

## 2020-03-10 ENCOUNTER — HOSPITAL ENCOUNTER (OUTPATIENT)
Dept: RADIOLOGY | Facility: HOSPITAL | Age: 65
Discharge: HOME | End: 2020-03-10
Attending: INTERNAL MEDICINE
Payer: COMMERCIAL

## 2020-03-10 DIAGNOSIS — Z12.39 SCREENING FOR BREAST CANCER: ICD-10-CM

## 2020-03-10 DIAGNOSIS — M81.0 OSTEOPOROSIS, POSTMENOPAUSAL: ICD-10-CM

## 2020-03-10 PROCEDURE — 77080 DXA BONE DENSITY AXIAL: CPT | Mod: 26,,, | Performed by: RADIOLOGY

## 2020-03-10 PROCEDURE — 77063 BREAST TOMOSYNTHESIS BI: CPT | Mod: 26,,, | Performed by: RADIOLOGY

## 2020-03-10 PROCEDURE — 77067 SCR MAMMO BI INCL CAD: CPT | Mod: 26,,, | Performed by: RADIOLOGY

## 2020-03-10 PROCEDURE — 77063 MAMMO DIGITAL SCREENING BILAT WITH TOMOSYNTHESIS_CAD: ICD-10-PCS | Mod: 26,,, | Performed by: RADIOLOGY

## 2020-03-10 PROCEDURE — 77080 DEXA BONE DENSITY SPINE HIP: ICD-10-PCS | Mod: 26,,, | Performed by: RADIOLOGY

## 2020-03-10 PROCEDURE — 77067 SCR MAMMO BI INCL CAD: CPT | Mod: TC,PO

## 2020-03-10 PROCEDURE — 77080 DXA BONE DENSITY AXIAL: CPT | Mod: TC

## 2020-03-10 PROCEDURE — 77067 MAMMO DIGITAL SCREENING BILAT WITH TOMOSYNTHESIS_CAD: ICD-10-PCS | Mod: 26,,, | Performed by: RADIOLOGY

## 2020-03-12 ENCOUNTER — TELEPHONE (OUTPATIENT)
Dept: FAMILY MEDICINE | Facility: CLINIC | Age: 65
End: 2020-03-12

## 2020-03-12 NOTE — TELEPHONE ENCOUNTER
A left a message on the patient's voicemail that her mammogram was negative need to be repeated in 1 year.  If she had any question she could call me at [phone].

## 2020-03-18 ENCOUNTER — TELEPHONE (OUTPATIENT)
Dept: FAMILY MEDICINE | Facility: CLINIC | Age: 65
End: 2020-03-18

## 2020-03-18 NOTE — TELEPHONE ENCOUNTER
----- Message from Allegra Mobley sent at 3/18/2020 10:47 AM CDT -----  Contact: Self [phone]  Type: Patient Call Back    Who called: Self     What is the request in detail: pt. Would like to know if she needs to add a supplement to Vitamin     Can the clinic reply by [facility]? Call back    Would the patient rather a call back or a response via [facility]? Call back    Best call back number: [phone]

## 2020-04-29 ENCOUNTER — TELEPHONE (OUTPATIENT)
Dept: FAMILY MEDICINE | Facility: CLINIC | Age: 65
End: 2020-04-29

## 2020-04-29 NOTE — TELEPHONE ENCOUNTER
Spoke with the patient and she is calling to report BP records.  4/29/2020 144/92  4/28/2020 146/93   4/27/2020 133/74   4/24/2020 131/88    4/23/2020  126/87.  Patient said she takes her medication at night.  Please advise

## 2020-04-29 NOTE — TELEPHONE ENCOUNTER
----- Message from Carolynkayla Pagan sent at 4/28/2020  3:56 PM CDT -----  Contact: Self [phone]  Type: Patient Call Back    Who called: Self    What is the request in detail: pt is calling because she has questions about her blood pressure medication    Can the clinic reply by [facility]? Call back    Would the patient rather a call back or a response via [facility]? Call back    Best call back number: [phone]

## 2020-04-29 NOTE — TELEPHONE ENCOUNTER
First few readings ok, but the last 2 days it is a little higher. Did she do or eat any differently those days?  Goal BP is 139/89 or less.

## 2020-04-29 NOTE — TELEPHONE ENCOUNTER
Spoke with patient and she said that they on thing different was she did cook some red beans and put smoke sausage and ham in them. She said that she only had a small piece of ham but no sausage.

## 2020-05-15 ENCOUNTER — LAB VISIT (OUTPATIENT)
Dept: PRIMARY CARE CLINIC | Facility: CLINIC | Age: 65
End: 2020-05-15
Payer: COMMERCIAL

## 2020-05-15 DIAGNOSIS — Z00.6 RESEARCH STUDY PATIENT: Primary | ICD-10-CM

## 2020-05-15 PROCEDURE — U0003 INFECTIOUS AGENT DETECTION BY NUCLEIC ACID (DNA OR RNA); SEVERE ACUTE RESPIRATORY SYNDROME CORONAVIRUS 2 (SARS-COV-2) (CORONAVIRUS DISEASE [COVID-19]), AMPLIFIED PROBE TECHNIQUE, MAKING USE OF HIGH THROUGHPUT TECHNOLOGIES AS DESCRIBED BY CMS-2020-01-R: HCPCS

## 2020-05-15 PROCEDURE — 86769 SARS-COV-2 COVID-19 ANTIBODY: CPT

## 2020-05-15 NOTE — RESEARCH
Date of Consent: 05/15/2020     Sponsor: [facility]    Study Title/IRB Number: Observational study of Sars-CoV2 Immunoglobulin G (IgG) seroprevalence among the [facility] population over time 2020.163  Principle Investigator: Kaylynn Snell, PhD    Did the patient need translation services? No   name: N/A    Prior to the Informed Consent (IC) being signed, or any study protocol required data collection, testing, procedure, or intervention being performed, the following was done and/or discussed:   Patient was given a paper copy of the IC for review    Patient was given study FAQ   Purpose of the study and qualifications to participate    Study design and tests or procedures done at this visit   Confidentiality and HIPAA Authorization for Release of Medical Records for the research trial/ subject's rights/research related injury   Risk, Benefits, Alternative Treatments, Compensation and Costs   Participation in the research trial is voluntary and patient may withdraw at anytime   Contact information for study related questions    Patient verbalizes understanding of the above: Yes  Contact information for PI and IRB given to patient: Yes  Patient able to adequately summarize: the purpose of the study, the risks associated with the study, and all procedures, testing, and follow-ups associated with the study: Yes    The consent was discussed verbally with the patient and all questions were answered satisfactorily. Patient gave verbal consent for the Seroprevalence research study with an IRB approval date of 05/08/2020.      The Consent, Consent Witness and name of Clinical Research Coordinator consenting was captured and documented in [employer]Cap.    All Inclusion and Exclusion Criteria reviewed, subject meets all Inclusion criteria and does not meet any Exclusion Criteria at this time.     Patient Eligibility was confirmed.    Patient responded to survey questions.    The following biospecimen  collection procedures were collected:    -Nasopharyngeal Swab Collection  -Blood collection

## 2020-05-16 LAB — SARS-COV-2 IGG SERPLBLD QL IA.RAPID: NEGATIVE

## 2020-05-19 LAB — SARS-COV-2 RNA RESP QL NAA+PROBE: NOT DETECTED

## 2020-05-28 DIAGNOSIS — M81.0 OSTEOPOROSIS, POSTMENOPAUSAL: ICD-10-CM

## 2020-05-28 RX ORDER — ALENDRONATE SODIUM 70 MG/1
TABLET ORAL
Qty: 12 TABLET | Refills: 2 | Status: SHIPPED | OUTPATIENT
Start: 2020-05-28 | End: 2021-02-08

## 2020-06-03 ENCOUNTER — TELEPHONE (OUTPATIENT)
Dept: NEUROSURGERY | Facility: CLINIC | Age: 65
End: 2020-06-03

## 2020-06-03 DIAGNOSIS — D33.3 ACOUSTIC NEUROMA: Primary | ICD-10-CM

## 2020-07-13 ENCOUNTER — TELEPHONE (OUTPATIENT)
Dept: NEUROLOGY | Facility: CLINIC | Age: 65
End: 2020-07-13

## 2020-07-13 NOTE — TELEPHONE ENCOUNTER
----- Message from Nicholas Simon sent at 7/10/2020  2:23 PM CDT -----  Contact: pt @ [phone]  Pt states she's returning a call to the clinic, pt said it may be about an appt. Pt said no VM left w/ name of who called.

## 2020-07-17 ENCOUNTER — TELEPHONE (OUTPATIENT)
Dept: NEUROSURGERY | Facility: CLINIC | Age: 65
End: 2020-07-17

## 2020-07-17 NOTE — TELEPHONE ENCOUNTER
Left voicemail requesting update on status of external MRI pt requested. Will proceed to arrange f/u appt after scan is scheduled/completed.

## 2020-07-21 ENCOUNTER — TELEPHONE (OUTPATIENT)
Dept: NEUROSURGERY | Facility: CLINIC | Age: 65
End: 2020-07-21

## 2020-07-21 NOTE — TELEPHONE ENCOUNTER
----- Message from Nicholas Simon sent at 7/21/2020  9:05 AM CDT -----  Contact: pt @ [phone]  Pt asking to speak w/ Inocencio regarding MRI

## 2020-07-21 NOTE — TELEPHONE ENCOUNTER
Spoke with pt, she reports [facility] did not receive outside imaging orders. Pulled original order and resent as requested. Pt v/u and in agreement with plan.

## 2020-08-03 ENCOUNTER — TELEPHONE (OUTPATIENT)
Dept: NEUROSURGERY | Facility: CLINIC | Age: 65
End: 2020-08-03

## 2020-08-03 NOTE — TELEPHONE ENCOUNTER
----- Message from Lorrie Torre sent at 8/3/2020  9:17 AM CDT -----  Regarding: pt advice  Contact: pt @ [phone]  Calling to speak with someone in Dr. Woods's office, says she is to have a test on tomorrow, and not sure if an appt would be needed and if so how soon. Please call.

## 2020-08-05 ENCOUNTER — OFFICE VISIT (OUTPATIENT)
Dept: NEUROSURGERY | Facility: CLINIC | Age: 65
End: 2020-08-05
Payer: COMMERCIAL

## 2020-08-05 VITALS
BODY MASS INDEX: 16.68 KG/M2 | WEIGHT: 94.13 LBS | TEMPERATURE: 98 F | HEART RATE: 98 BPM | DIASTOLIC BLOOD PRESSURE: 87 MMHG | SYSTOLIC BLOOD PRESSURE: 156 MMHG

## 2020-08-05 DIAGNOSIS — D33.3 ACOUSTIC NEUROMA: Primary | ICD-10-CM

## 2020-08-05 PROCEDURE — 3079F PR MOST RECENT DIASTOLIC BLOOD PRESSURE 80-89 MM HG: ICD-10-PCS | Mod: CPTII,S$GLB,, | Performed by: NEUROLOGICAL SURGERY

## 2020-08-05 PROCEDURE — 3079F DIAST BP 80-89 MM HG: CPT | Mod: CPTII,S$GLB,, | Performed by: NEUROLOGICAL SURGERY

## 2020-08-05 PROCEDURE — 1159F PR MEDICATION LIST DOCUMENTED IN MEDICAL RECORD: ICD-10-PCS | Mod: S$GLB,,, | Performed by: NEUROLOGICAL SURGERY

## 2020-08-05 PROCEDURE — 1126F PR PAIN SEVERITY QUANTIFIED, NO PAIN PRESENT: ICD-10-PCS | Mod: S$GLB,,, | Performed by: NEUROLOGICAL SURGERY

## 2020-08-05 PROCEDURE — 1101F PT FALLS ASSESS-DOCD LE1/YR: CPT | Mod: CPTII,S$GLB,, | Performed by: NEUROLOGICAL SURGERY

## 2020-08-05 PROCEDURE — 99214 PR OFFICE/OUTPT VISIT, EST, LEVL IV, 30-39 MIN: ICD-10-PCS | Mod: S$GLB,,, | Performed by: NEUROLOGICAL SURGERY

## 2020-08-05 PROCEDURE — 3077F SYST BP >= 140 MM HG: CPT | Mod: CPTII,S$GLB,, | Performed by: NEUROLOGICAL SURGERY

## 2020-08-05 PROCEDURE — 3008F PR BODY MASS INDEX (BMI) DOCUMENTED: ICD-10-PCS | Mod: CPTII,S$GLB,, | Performed by: NEUROLOGICAL SURGERY

## 2020-08-05 PROCEDURE — 99999 PR PBB SHADOW E&M-EST. PATIENT-LVL III: ICD-10-PCS | Mod: PBBFAC,,, | Performed by: NEUROLOGICAL SURGERY

## 2020-08-05 PROCEDURE — 1159F MED LIST DOCD IN RCRD: CPT | Mod: S$GLB,,, | Performed by: NEUROLOGICAL SURGERY

## 2020-08-05 PROCEDURE — 99999 PR PBB SHADOW E&M-EST. PATIENT-LVL III: CPT | Mod: PBBFAC,,, | Performed by: NEUROLOGICAL SURGERY

## 2020-08-05 PROCEDURE — 1126F AMNT PAIN NOTED NONE PRSNT: CPT | Mod: S$GLB,,, | Performed by: NEUROLOGICAL SURGERY

## 2020-08-05 PROCEDURE — 99214 OFFICE O/P EST MOD 30 MIN: CPT | Mod: S$GLB,,, | Performed by: NEUROLOGICAL SURGERY

## 2020-08-05 PROCEDURE — 3008F BODY MASS INDEX DOCD: CPT | Mod: CPTII,S$GLB,, | Performed by: NEUROLOGICAL SURGERY

## 2020-08-05 PROCEDURE — 3077F PR MOST RECENT SYSTOLIC BLOOD PRESSURE >= 140 MM HG: ICD-10-PCS | Mod: CPTII,S$GLB,, | Performed by: NEUROLOGICAL SURGERY

## 2020-08-05 PROCEDURE — 1101F PR PT FALLS ASSESS DOC 0-1 FALLS W/OUT INJ PAST YR: ICD-10-PCS | Mod: CPTII,S$GLB,, | Performed by: NEUROLOGICAL SURGERY

## 2020-08-05 NOTE — PATIENT INSTRUCTIONS
I have personally reviewed the MRI brain with the pt which shows a decrease in the extent of enhancement associated with the right acoustic neuroma with expected radiation effects. There is no associated edema.     I will schedule the patient for 1 year follow up (virtual or in person) with MRI brain.

## 2020-08-05 NOTE — PROGRESS NOTES
Subjective:   I, Sarahi Amador, attest that this documentation has been prepared under the direction and in the presence of Orion Woods MD.     Patient ID: Cathy Reynolds is a 65 y.o. female     Chief Complaint: No chief complaint on file.        HPI  MsRenaldo Reynolds is a pleasant 65 y.o. woman with an acoustic neuroma, treated with Gamma Knife Radiosurgery on 1/25/2019, who presents today for follow up with MRI brain. This is pt who was in her normal state of health until August 2018 when she developed gradual tinnitus in the right ear. She was seen by Otolaryngology in mid October and found to have hearing loss in that ear. 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 Pt was subsequently seen in clinic  and her imaging confirmed a sizable mass which we elected to treat stereotactically.  At the time of our last office visit, on 04/30/2019, the pt complained of mild fatigue.    Pt states her hearing has slightly improved on the right side and she has been doing very well in the interim. She has no complaints.       Review of Systems   Constitutional: Negative for activity change, fatigue, fever and unexpected weight change.   HENT: Negative for facial swelling.    Eyes: Negative.    Respiratory: Negative.    Cardiovascular: Negative.    Gastrointestinal: Negative for diarrhea, nausea and vomiting.   Genitourinary: Negative.    Musculoskeletal: Negative for back pain, joint swelling, myalgias and neck pain.   Neurological: Negative for dizziness, weakness, numbness and headaches.   Psychiatric/Behavioral: Negative.       Past Medical History:   Diagnosis Date    AR (allergic rhinitis)     Diverticulosis     Ear pain, right     Elevated LFTs     borderline - due to OTC herbals - resolved    History of colon polyps     Hyperlipidemia     borderline with high HDL    Hypertension     Mild anxiety     Osteoporosis, postmenopausal     Postmenopausal  status     Ringing in ear, right     Underweight     Vitamin D deficiency        Objective:      Vitals:    08/05/20 1011   BP: (!) 156/87   Pulse: 98   Temp: 97.7 °F (36.5 °C)      Physical Exam  Constitutional:       Appearance: She is well-developed.   HENT:      Head: Normocephalic and atraumatic.   Neck:      Musculoskeletal: Neck supple.   Neurological:      Mental Status: She is alert and oriented to person, place, and time.      GCS: GCS eye subscore is 4. GCS verbal subscore is 5. GCS motor subscore is 6.      Cranial Nerves: No cranial nerve deficit.            IMAGING:  MRI Brain (outside disc; 2020 ) shows a decrease in the extent of enhancement associated with the right acoustic neuroma with expected radiation effects. There is no associated edema.     I have personally reviewed the images with the pt.      I, Dr. Orion Woods, personally performed the services described in this documentation. All medical record entries made by the scribe, Sarahi Amador, were at my direction and in my presence.  I have reviewed the chart and agree that the record reflects my personal performance and is accurate and complete. Orion Woods MD. 08/05/2020    Assessment:       Acoustic neuroma.     Plan:   I have personally reviewed the MRI brain with the pt which shows a decrease in the extent of enhancement associated with the right acoustic neuroma with expected radiation effects. There is no associated edema.     I will schedule the patient for 1 year follow up (virtual or in person) with MRI brain.

## 2020-08-20 DIAGNOSIS — I10 ESSENTIAL HYPERTENSION: ICD-10-CM

## 2020-08-20 RX ORDER — OLMESARTAN MEDOXOMIL 5 MG/1
TABLET ORAL
Qty: 90 TABLET | Refills: 0 | Status: SHIPPED | OUTPATIENT
Start: 2020-08-20 | End: 2020-10-06 | Stop reason: DRUGHIGH

## 2020-09-02 ENCOUNTER — PATIENT OUTREACH (OUTPATIENT)
Dept: ADMINISTRATIVE | Facility: HOSPITAL | Age: 65
End: 2020-09-02

## 2020-09-02 DIAGNOSIS — Z12.11 SCREENING FOR COLON CANCER: Primary | ICD-10-CM

## 2020-09-02 DIAGNOSIS — Z12.11 SPECIAL SCREENING FOR MALIGNANT NEOPLASM OF COLON: Primary | ICD-10-CM

## 2020-09-02 DIAGNOSIS — Z01.818 PRE-OP TESTING: Primary | ICD-10-CM

## 2020-09-02 RX ORDER — POLYETHYLENE GLYCOL 3350, SODIUM SULFATE ANHYDROUS, SODIUM BICARBONATE, SODIUM CHLORIDE, POTASSIUM CHLORIDE 236; 22.74; 6.74; 5.86; 2.97 G/4L; G/4L; G/4L; G/4L; G/4L
4 POWDER, FOR SOLUTION ORAL ONCE
Qty: 4000 ML | Refills: 0 | Status: SHIPPED | OUTPATIENT
Start: 2020-09-02 | End: 2020-09-02

## 2020-09-05 ENCOUNTER — LAB VISIT (OUTPATIENT)
Dept: URGENT CARE | Facility: CLINIC | Age: 65
End: 2020-09-05
Payer: COMMERCIAL

## 2020-09-05 VITALS — OXYGEN SATURATION: 99 % | HEART RATE: 110 BPM | TEMPERATURE: 99 F | RESPIRATION RATE: 16 BRPM

## 2020-09-05 DIAGNOSIS — Z01.818 PRE-OP TESTING: ICD-10-CM

## 2020-09-05 PROCEDURE — U0003 INFECTIOUS AGENT DETECTION BY NUCLEIC ACID (DNA OR RNA); SEVERE ACUTE RESPIRATORY SYNDROME CORONAVIRUS 2 (SARS-COV-2) (CORONAVIRUS DISEASE [COVID-19]), AMPLIFIED PROBE TECHNIQUE, MAKING USE OF HIGH THROUGHPUT TECHNOLOGIES AS DESCRIBED BY CMS-2020-01-R: HCPCS

## 2020-09-06 LAB — SARS-COV-2 RNA RESP QL NAA+PROBE: NOT DETECTED

## 2020-09-08 ENCOUNTER — ANESTHESIA (OUTPATIENT)
Dept: ENDOSCOPY | Facility: HOSPITAL | Age: 65
End: 2020-09-08
Payer: COMMERCIAL

## 2020-09-08 ENCOUNTER — ANESTHESIA EVENT (OUTPATIENT)
Dept: ENDOSCOPY | Facility: HOSPITAL | Age: 65
End: 2020-09-08
Payer: COMMERCIAL

## 2020-09-08 ENCOUNTER — HOSPITAL ENCOUNTER (OUTPATIENT)
Facility: HOSPITAL | Age: 65
Discharge: HOME | End: 2020-09-08
Attending: COLON & RECTAL SURGERY | Admitting: COLON & RECTAL SURGERY
Payer: COMMERCIAL

## 2020-09-08 VITALS
BODY MASS INDEX: 16.66 KG/M2 | DIASTOLIC BLOOD PRESSURE: 84 MMHG | HEIGHT: 63 IN | SYSTOLIC BLOOD PRESSURE: 133 MMHG | OXYGEN SATURATION: 98 % | HEART RATE: 74 BPM | WEIGHT: 94 LBS | TEMPERATURE: 98 F | RESPIRATION RATE: 20 BRPM

## 2020-09-08 DIAGNOSIS — Z12.11 SCREEN FOR COLON CANCER: Primary | ICD-10-CM

## 2020-09-08 PROCEDURE — 45385 PR COLONOSCOPY,REMV LESN,SNARE: ICD-10-PCS | Mod: 33,,, | Performed by: COLON & RECTAL SURGERY

## 2020-09-08 PROCEDURE — E9220 PRA ENDO ANESTHESIA: ICD-10-PCS | Mod: 33,,, | Performed by: NURSE ANESTHETIST, CERTIFIED REGISTERED

## 2020-09-08 PROCEDURE — 25000003 PHARM REV CODE 250: Performed by: COLON & RECTAL SURGERY

## 2020-09-08 PROCEDURE — 63600175 PHARM REV CODE 636 W HCPCS: Performed by: NURSE ANESTHETIST, CERTIFIED REGISTERED

## 2020-09-08 PROCEDURE — 37000008 HC ANESTHESIA 1ST 15 MINUTES: Performed by: COLON & RECTAL SURGERY

## 2020-09-08 PROCEDURE — 88305 TISSUE EXAM BY PATHOLOGIST: CPT | Performed by: PATHOLOGY

## 2020-09-08 PROCEDURE — 45385 COLONOSCOPY W/LESION REMOVAL: CPT | Performed by: COLON & RECTAL SURGERY

## 2020-09-08 PROCEDURE — 88305 TISSUE EXAM BY PATHOLOGIST: CPT | Mod: 26,,, | Performed by: PATHOLOGY

## 2020-09-08 PROCEDURE — 27201089 HC SNARE, DISP (ANY): Performed by: COLON & RECTAL SURGERY

## 2020-09-08 PROCEDURE — 45385 COLONOSCOPY W/LESION REMOVAL: CPT | Mod: 33,,, | Performed by: COLON & RECTAL SURGERY

## 2020-09-08 PROCEDURE — E9220 PRA ENDO ANESTHESIA: HCPCS | Mod: 33,,, | Performed by: NURSE ANESTHETIST, CERTIFIED REGISTERED

## 2020-09-08 PROCEDURE — 88305 TISSUE EXAM BY PATHOLOGIST: ICD-10-PCS | Mod: 26,,, | Performed by: PATHOLOGY

## 2020-09-08 PROCEDURE — 37000009 HC ANESTHESIA EA ADD 15 MINS: Performed by: COLON & RECTAL SURGERY

## 2020-09-08 RX ORDER — PROPOFOL 10 MG/ML
VIAL (ML) INTRAVENOUS
Status: DISCONTINUED | OUTPATIENT
Start: 2020-09-08 | End: 2020-09-08

## 2020-09-08 RX ORDER — PROPOFOL 10 MG/ML
VIAL (ML) INTRAVENOUS CONTINUOUS PRN
Status: DISCONTINUED | OUTPATIENT
Start: 2020-09-08 | End: 2020-09-08

## 2020-09-08 RX ORDER — LIDOCAINE HYDROCHLORIDE 20 MG/ML
INJECTION INTRAVENOUS
Status: DISCONTINUED | OUTPATIENT
Start: 2020-09-08 | End: 2020-09-08

## 2020-09-08 RX ORDER — SODIUM CHLORIDE 9 MG/ML
INJECTION, SOLUTION INTRAVENOUS CONTINUOUS
Status: DISCONTINUED | OUTPATIENT
Start: 2020-09-08 | End: 2020-09-08 | Stop reason: HOSPADM

## 2020-09-08 RX ORDER — PHENYLEPHRINE HYDROCHLORIDE 10 MG/ML
INJECTION INTRAVENOUS
Status: DISCONTINUED | OUTPATIENT
Start: 2020-09-08 | End: 2020-09-08

## 2020-09-08 RX ADMIN — PHENYLEPHRINE HYDROCHLORIDE 100 MCG: 10 INJECTION INTRAVENOUS at 11:09

## 2020-09-08 RX ADMIN — PROPOFOL 150 MCG/KG/MIN: 10 INJECTION, EMULSION INTRAVENOUS at 11:09

## 2020-09-08 RX ADMIN — PHENYLEPHRINE HYDROCHLORIDE 50 MCG: 10 INJECTION INTRAVENOUS at 11:09

## 2020-09-08 RX ADMIN — PROPOFOL 20 MG: 10 INJECTION, EMULSION INTRAVENOUS at 11:09

## 2020-09-08 RX ADMIN — PROPOFOL 30 MG: 10 INJECTION, EMULSION INTRAVENOUS at 11:09

## 2020-09-08 RX ADMIN — PROPOFOL 10 MG: 10 INJECTION, EMULSION INTRAVENOUS at 11:09

## 2020-09-08 RX ADMIN — SODIUM CHLORIDE: 0.9 INJECTION, SOLUTION INTRAVENOUS at 10:09

## 2020-09-08 RX ADMIN — LIDOCAINE HYDROCHLORIDE 25 MG: 20 INJECTION, SOLUTION INTRAVENOUS at 11:09

## 2020-09-08 NOTE — H&P
COLONOSCOPY HISTORY AND PE    Procedure : Colonoscopy      INDICATIONS: asymptomatic screening exam and personal history of colon polyps - tubular adenoma of sigmoid       Past Medical History:   Diagnosis Date    AR (allergic rhinitis)     Diverticulosis     Ear pain, right     Elevated LFTs     borderline - due to OTC herbals - resolved    History of colon polyps     Hyperlipidemia     borderline with high HDL    Hypertension     Mild anxiety     Osteoporosis, postmenopausal     Postmenopausal status     Ringing in ear, right     Underweight     Vitamin D deficiency        Past Surgical History:   Procedure Laterality Date     SECTION, LOW TRANSVERSE      COLONOSCOPY      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      Current Outpatient Medications on File Prior to Encounter   Medication Sig Dispense Refill    alendronate (FOSAMAX) 70 MG tablet TAKE 1 TABLET BY MOUTH ONCE A WEEK (EVERY  7  DAYS) 12 tablet 2    cholecalciferol, vitamin D3, (VITAMIN D3) 2,000 unit Cap Take 2 capsules by mouth once daily.       fluticasone propionate (FLONASE) 50 mcg/actuation nasal spray USE 1 SPRAY IN EACH NOSTRIL TWICE A DAY 16 g 2    folic acid/multivit-min/lutein (CENTRUM SILVER ORAL) Take by mouth.      olmesartan (BENICAR) 5 MG Tab TAKE 1 TABLET(5 MG) BY MOUTH EVERY DAY 90 tablet 0    olopatadine (PATANOL) 0.1 % ophthalmic solution INT 1 GTT ON OU BID  4       Family History   Problem Relation Age of Onset    Hypertension Mother     Hypertension Brother     Hypertension Sister     Diabetes  Sister     Breast cancer Maternal Aunt     Colon cancer Neg Hx     Ovarian cancer Neg Hx        Social History     Socioeconomic History    Marital status:      Spouse name: Not on file    Number of children: 2    Years of education: Not on file    Highest education level: Not on file   Occupational History    Not on file   Social Needs    Financial resource strain: Not on file    Food insecurity     Worry: Not on file     Inability: Not on file    Transportation needs     Medical: Not on file     Non-medical: Not on file   Tobacco Use    Smoking status: Never Smoker    Smokeless tobacco: Never Used   Substance and Sexual Activity    Alcohol use: No    Drug use: No    Sexual activity: Yes     Partners: Male     Birth control/protection: None   Lifestyle    Physical activity     Days per week: Not on file     Minutes per session: Not on file    Stress: Not on file   Relationships    Social connections     Talks on phone: Not on file     Gets together: Not on file     Attends Jehovah's witness service: Not on file     Active member of club or organization: Not on file     Attends meetings of clubs or organizations: Not on file     Relationship status: Not on file   Other Topics Concern    Not on file   Social History Narrative    Teacher at [employer]           Review of Systems -    Respiratory : no cough, shortness of breath, or wheezing  Cardiovascular  no chest pain or dyspnea on exertion  Gastrointestinal no abdominal pain, change in bowel habits, or black or bloody stools  Musculoskeletal no deformities, swelling  Neurological no TIA or stroke symptoms        Physical Exam:  General: NAD  AT NC EOMI  Mallampati Score   Neck supple, trachea midline  Lungs: nl excursions, no retractions.  Breathing comfortably  Abdomen ND soft NT.  No masses  Extremities: No CCE.      ASA:  II    PLAN  COLONOSCOPY.  The details of the procedure, the possible need for biopsy or polypectomy and the  potential risks including bleeding, perforation, missed polyps were discussed in detail.

## 2020-09-08 NOTE — [employer] PATIENT INSTRUCTIONS
Discharge Summary/Instructions after an Endoscopic Procedure  Patient Name: Cathy Reynolds  Patient MRN: [id number]  Patient YOB: 1955 Tuesday, September 8, 2020  Boo Gordon MD  RESTRICTIONS:  During your procedure today, you received medications for sedation.  These   medications may affect your judgment, balance and coordination.  Therefore,   for 24 hours, you have the following restrictions:   - DO NOT drive a car, operate machinery, make legal/financial decisions,   sign important papers or drink alcohol.    ACTIVITY:  Today: no heavy lifting, straining or running due to procedural   sedation/anesthesia.  The following day: return to full activity including work.  DIET:  Eat and drink normally unless instructed otherwise.     TREATMENT FOR COMMON SIDE EFFECTS:  - Mild abdominal pain, nausea, belching, bloating or excessive gas:  rest,   eat lightly and use a heating pad.  - Sore Throat: treat with throat lozenges and/or gargle with warm salt   water.  - Because air was used during the procedure, expelling large amounts of air   from your rectum or belching is normal.  - If a bowel prep was taken, you may not have a bowel movement for 1-3 days.    This is normal.  SYMPTOMS TO WATCH FOR AND REPORT TO YOUR PHYSICIAN:  1. Abdominal pain or bloating, other than gas cramps.  2. Chest pain.  3. Back pain.  4. Signs of infection such as: chills or fever occurring within 24 hours   after the procedure.  5. Rectal bleeding, which would show as bright red, maroon, or black stools.   (A tablespoon of blood from the rectum is not serious, especially if   hemorrhoids are present.)  6. Vomiting.  7. Weakness or dizziness.  GO DIRECTLY TO THE NEAREST EMERGENCY ROOM IF YOU HAVE ANY OF THE FOLLOWING:      Difficulty breathing              Chills and/or fever over 101 F   Persistent vomiting and/or vomiting blood   Severe abdominal pain   Severe chest pain   Black, tarry stools   Bleeding- more than one  tablespoon   Any other symptom or condition that you feel may need urgent attention  Your doctor recommends these additional instructions:  If any biopsies were taken, your doctors clinic will contact you in 1 to 2   weeks with any results.  - Discharge patient to home (ambulatory).   - Patient has a contact number available for emergencies.  The signs and   symptoms of potential delayed complications were discussed with the   patient.  Return to normal activities tomorrow.  Written discharge   instructions were provided to the patient.   - Resume previous diet.   - Continue present medications.   - Await pathology results.   - Repeat colonoscopy in 5 years for surveillance.  For questions, problems or results please call your physician - Boo Gordon MD at Work:  [phone].  [facility], EMERGENCY ROOM PHONE NUMBER: [phone]  IF A COMPLICATION OR EMERGENCY SITUATION ARISES AND YOU ARE UNABLE TO REACH   YOUR PHYSICIAN - GO DIRECTLY TO THE EMERGENCY ROOM.  Boo Gordon MD  9/8/2020 11:47:50 AM  This report has been verified and signed electronically.  [employer]

## 2020-09-08 NOTE — ANESTHESIA POSTPROCEDURE EVALUATION
Anesthesia Post Evaluation    Patient: Cathy Reynolds    Procedure(s) Performed: Procedure(s) (LRB):  COLONOSCOPY (N/A)    Final Anesthesia Type: general    Patient location during evaluation: PACU  Patient participation: Yes- Able to Participate  Level of consciousness: awake and alert and oriented  Post-procedure vital signs: reviewed and stable  Pain management: adequate  Airway patency: patent    PONV status at discharge: No PONV  Anesthetic complications: no      Cardiovascular status: blood pressure returned to baseline, hemodynamically stable and stable  Respiratory status: unassisted, room air and spontaneous ventilation  Hydration status: euvolemic  Follow-up not needed.          Vitals Value Taken Time   /84 09/08/20 1220   Temp 36.5 °C (97.7 °F) 09/08/20 1152   Pulse 74 09/08/20 1220   Resp 20 09/08/20 1220   SpO2 98 % 09/08/20 1220         Event Time   Out of Recovery 12:27:19         Pain/Kavon Score: Kavon Score: 9 (9/8/2020 12:26 PM)

## 2020-09-08 NOTE — ANESTHESIA PREPROCEDURE EVALUATION
2020  Cathy Reynolds is a 65 y.o., female.    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    I have reviewed the Nursing Notes. I have reviewed the NPO Status.   I have reviewed the Medications.     Review of Systems  Anesthesia Hx:  No problems with previous Anesthesia  History of prior surgery of interest to airway management or planning: Denies Family Hx of Anesthesia complications.   Denies Personal Hx of Anesthesia complications.   Cardiovascular:   Exercise tolerance: good Hypertension        Physical Exam  General:  Well nourished    Airway/Jaw/Neck:  AIRWAY FINDINGS: Normal      Eyes/Ears/Nose:  EYES/EARS/NOSE FINDINGS: Normal   Dental:  DENTAL FINDINGS: Normal   Chest/Lungs:  Chest/Lungs Clear    Heart/Vascular:  Heart Findings: Normal       Mental Status:  Mental Status Findings: Normal        Anesthesia Plan  Type of Anesthesia, risks & benefits discussed:  Anesthesia Type:  general  Patient's Preference:   Intra-op Monitoring Plan: standard ASA monitors  Intra-op Monitoring Plan Comments:   Post Op Pain Control Plan:   Post Op Pain Control Plan Comments:   Induction:   IV  Beta Blocker:  Patient is not currently on a  Beta-Blocker (No further documentation required).       Informed Consent: Patient understands risks and agrees with Anesthesia plan.  Questions answered. Anesthesia consent signed with patient.  ASA Score: 2     Day of Surgery Review of History & Physical:    H&P update referred to the provider.         Ready For Surgery From Anesthesia Perspective.

## 2020-09-08 NOTE — TRANSFER OF CARE
"Anesthesia Transfer of Care Note    Patient: Cathy Reynolds    Procedure(s) Performed: Procedure(s) (LRB):  COLONOSCOPY (N/A)    Patient location: GI    Anesthesia Type: general    Transport from OR: Transported from OR on room air with adequate spontaneous ventilation    Post pain: adequate analgesia    Post assessment: no apparent anesthetic complications and tolerated procedure well    Post vital signs: stable    Level of consciousness: awake and alert    Nausea/Vomiting: no nausea/vomiting    Complications: none    Transfer of care protocol was followed      Last vitals:   Visit Vitals  /67 (BP Location: Left arm, Patient Position: Lying)   Pulse 99   Temp 36.5 °C (97.7 °F) (Temporal)   Resp 17   Ht 5' 3" (1.6 m)   Wt 42.6 kg (94 lb)   SpO2 99%   Breastfeeding No   BMI 16.65 kg/m²     "

## 2020-09-10 ENCOUNTER — TELEPHONE (OUTPATIENT)
Dept: SURGERY | Facility: CLINIC | Age: 65
End: 2020-09-10

## 2020-09-10 NOTE — TELEPHONE ENCOUNTER
----- Message from Alison Berger RN sent at 9/9/2020 10:03 AM CDT -----  Contact: [phone]  Good morning Dasha,    Return to work notes come from the [facility] physician.    Thanks,  Alison  ----- Message -----  From: Dasha Antony RN  Sent: 9/9/2020   9:47 AM CDT  To: [facility] Endo Schedulers      ----- Message -----  From: Fernanda Martinez MA  Sent: 9/9/2020   8:04 AM CDT  To: Evan Haddad Staff    Pt needs a note stating that she had a routine colonoscopy on 9/8/2020 and is ok to return to work today.    Email to pt   [email or web address]

## 2020-09-11 ENCOUNTER — IMMUNIZATION (OUTPATIENT)
Dept: PHARMACY | Facility: CLINIC | Age: 65
End: 2020-09-11
Payer: COMMERCIAL

## 2020-09-11 PROBLEM — Z12.11 SCREEN FOR COLON CANCER: Status: RESOLVED | Noted: 2020-09-08 | Resolved: 2020-09-11

## 2020-09-11 LAB
FINAL PATHOLOGIC DIAGNOSIS: NORMAL
GROSS: NORMAL

## 2020-09-16 ENCOUNTER — PATIENT OUTREACH (OUTPATIENT)
Dept: ADMINISTRATIVE | Facility: HOSPITAL | Age: 65
End: 2020-09-16

## 2020-09-16 RX ORDER — POLYETHYLENE GLYCOL 3350, SODIUM SULFATE ANHYDROUS, SODIUM BICARBONATE, SODIUM CHLORIDE, POTASSIUM CHLORIDE 236; 22.74; 6.74; 5.86; 2.97 G/4L; G/4L; G/4L; G/4L; G/4L
POWDER, FOR SOLUTION ORAL
Status: ON HOLD | COMMUNITY
Start: 2020-09-02 | End: 2021-09-13

## 2020-09-16 NOTE — PROGRESS NOTES
[facility] self reporting blood pressure / office visit - Left message for patient to call our office.

## 2020-09-16 NOTE — PROGRESS NOTES
Spoke w/ patient, she has confirmed that she does have a blood pressure monitor, but she is currently at work. She will call back with her blood pressure reading.

## 2020-09-17 ENCOUNTER — TELEPHONE (OUTPATIENT)
Dept: FAMILY MEDICINE | Facility: CLINIC | Age: 65
End: 2020-09-17

## 2020-09-21 NOTE — PROGRESS NOTES
Adenoma or history of polyps    Repeat colonoscopy in 5 years       If you have any questions or if I can clarify any of the above please contact me:    [facility]azo [phone]   Pager [phone]  email [email or web address]  Nurse Hyun Gonzalez [phone]  :  [phone]    Sincerely  H, Boo Gordon MD, FACS, FASCRS  Staff Surgeon  Dept of Colon and Rectal Surgery

## 2020-09-25 ENCOUNTER — PATIENT MESSAGE (OUTPATIENT)
Dept: OTHER | Facility: OTHER | Age: 65
End: 2020-09-25

## 2020-10-06 ENCOUNTER — OFFICE VISIT (OUTPATIENT)
Dept: FAMILY MEDICINE | Facility: CLINIC | Age: 65
End: 2020-10-06
Payer: COMMERCIAL

## 2020-10-06 VITALS
HEART RATE: 86 BPM | SYSTOLIC BLOOD PRESSURE: 150 MMHG | WEIGHT: 94.38 LBS | BODY MASS INDEX: 16.72 KG/M2 | TEMPERATURE: 98 F | OXYGEN SATURATION: 96 % | DIASTOLIC BLOOD PRESSURE: 90 MMHG | HEIGHT: 63 IN

## 2020-10-06 DIAGNOSIS — I10 ESSENTIAL HYPERTENSION: Primary | ICD-10-CM

## 2020-10-06 DIAGNOSIS — Z11.1 SCREENING FOR TUBERCULOSIS: ICD-10-CM

## 2020-10-06 DIAGNOSIS — M81.0 OSTEOPOROSIS, POSTMENOPAUSAL: ICD-10-CM

## 2020-10-06 PROCEDURE — 3077F PR MOST RECENT SYSTOLIC BLOOD PRESSURE >= 140 MM HG: ICD-10-PCS | Mod: CPTII,S$GLB,, | Performed by: NURSE PRACTITIONER

## 2020-10-06 PROCEDURE — 99214 PR OFFICE/OUTPT VISIT, EST, LEVL IV, 30-39 MIN: ICD-10-PCS | Mod: S$GLB,,, | Performed by: NURSE PRACTITIONER

## 2020-10-06 PROCEDURE — 1159F MED LIST DOCD IN RCRD: CPT | Mod: S$GLB,,, | Performed by: NURSE PRACTITIONER

## 2020-10-06 PROCEDURE — 3080F PR MOST RECENT DIASTOLIC BLOOD PRESSURE >= 90 MM HG: ICD-10-PCS | Mod: CPTII,S$GLB,, | Performed by: NURSE PRACTITIONER

## 2020-10-06 PROCEDURE — 86580 POCT TB SKIN TEST: ICD-10-PCS | Mod: S$GLB,,, | Performed by: NURSE PRACTITIONER

## 2020-10-06 PROCEDURE — 3008F BODY MASS INDEX DOCD: CPT | Mod: CPTII,S$GLB,, | Performed by: NURSE PRACTITIONER

## 2020-10-06 PROCEDURE — 3077F SYST BP >= 140 MM HG: CPT | Mod: CPTII,S$GLB,, | Performed by: NURSE PRACTITIONER

## 2020-10-06 PROCEDURE — 3008F PR BODY MASS INDEX (BMI) DOCUMENTED: ICD-10-PCS | Mod: CPTII,S$GLB,, | Performed by: NURSE PRACTITIONER

## 2020-10-06 PROCEDURE — 86580 TB INTRADERMAL TEST: CPT | Mod: S$GLB,,, | Performed by: NURSE PRACTITIONER

## 2020-10-06 PROCEDURE — 99999 PR PBB SHADOW E&M-EST. PATIENT-LVL III: CPT | Mod: PBBFAC,,, | Performed by: NURSE PRACTITIONER

## 2020-10-06 PROCEDURE — 1101F PT FALLS ASSESS-DOCD LE1/YR: CPT | Mod: CPTII,S$GLB,, | Performed by: NURSE PRACTITIONER

## 2020-10-06 PROCEDURE — 99999 PR PBB SHADOW E&M-EST. PATIENT-LVL III: ICD-10-PCS | Mod: PBBFAC,,, | Performed by: NURSE PRACTITIONER

## 2020-10-06 PROCEDURE — 99214 OFFICE O/P EST MOD 30 MIN: CPT | Mod: S$GLB,,, | Performed by: NURSE PRACTITIONER

## 2020-10-06 PROCEDURE — 1126F PR PAIN SEVERITY QUANTIFIED, NO PAIN PRESENT: ICD-10-PCS | Mod: S$GLB,,, | Performed by: NURSE PRACTITIONER

## 2020-10-06 PROCEDURE — 1126F AMNT PAIN NOTED NONE PRSNT: CPT | Mod: S$GLB,,, | Performed by: NURSE PRACTITIONER

## 2020-10-06 PROCEDURE — 1101F PR PT FALLS ASSESS DOC 0-1 FALLS W/OUT INJ PAST YR: ICD-10-PCS | Mod: CPTII,S$GLB,, | Performed by: NURSE PRACTITIONER

## 2020-10-06 PROCEDURE — 1159F PR MEDICATION LIST DOCUMENTED IN MEDICAL RECORD: ICD-10-PCS | Mod: S$GLB,,, | Performed by: NURSE PRACTITIONER

## 2020-10-06 PROCEDURE — 3080F DIAST BP >= 90 MM HG: CPT | Mod: CPTII,S$GLB,, | Performed by: NURSE PRACTITIONER

## 2020-10-06 RX ORDER — OLMESARTAN MEDOXOMIL 5 MG/1
TABLET ORAL
Qty: 270 TABLET | Refills: 3 | Status: SHIPPED | OUTPATIENT
Start: 2020-10-06 | End: 2020-11-05 | Stop reason: DRUGHIGH

## 2020-10-06 NOTE — PROGRESS NOTES
Patient tolerated injection well.  Instructed to return to clinic to have the test read not before 48 hours but not after 72 hours of placement or the test would have to be repeated.  Verbalized understanding and scheduled appointment for PPD reading on 10/8/2020.

## 2020-10-06 NOTE — PROGRESS NOTES
Subjective:       Patient ID: Cathy Reynolds is a 65 y.o. female.    Chief Complaint: Hypertension and PPD Reading    65-year-old female presents to the clinic today for evaluation of hypertension.  Her blood pressure today is 150/90.  Her wrist blood pressure machine is reading 155/100.  She is only taking Benicar 5 mg p.o. daily.  Her home blood pressures according to her machine run anywhere from 121-162 over 83-97.  She denies any headaches, dizziness, or blurred vision.  She denies any cardiac chest pain, heart palpitations, shortness of breath, or swelling to lower extremities.  She needs a TB skin test for work.  She denies any specific complaints today.    Past Medical History:   Diagnosis Date    AR (allergic rhinitis)     Diverticulosis     Ear pain, right     Elevated LFTs     borderline - due to OTC herbals - resolved    History of colon polyps     Hyperlipidemia     borderline with high HDL    Hypertension     Mild anxiety     Osteoporosis, postmenopausal     Postmenopausal status     Ringing in ear, right     Underweight     Vitamin D deficiency      Past Surgical History:   Procedure Laterality Date     SECTION, LOW TRANSVERSE      COLONOSCOPY      COLONOSCOPY N/A 2020    Procedure: COLONOSCOPY;  Surgeon: STEFFANIE Gordon MD;  Location: [address]);  Service: Endoscopy;  Laterality: N/A;  COVID test on 20 at [place] [facility] -     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 She has never used smokeless tobacco. She reports that she does not drink alcohol or use drugs.  Review of Systems   Respiratory: Negative for cough and shortness of breath.    Cardiovascular: Negative for chest pain, palpitations and leg swelling.   Gastrointestinal: Negative for abdominal pain, constipation and vomiting.   Neurological: Negative for dizziness,  light-headedness and headaches.       Objective:      Physical Exam  Constitutional:       General: She is not in acute distress.     Appearance: She is not ill-appearing, toxic-appearing or diaphoretic.      Comments: Very thin has always been very thin    Cardiovascular:      Rate and Rhythm: Normal rate and regular rhythm.      Heart sounds: Normal heart sounds. No murmur.   Pulmonary:      Effort: Pulmonary effort is normal.      Breath sounds: Normal breath sounds. No wheezing or rhonchi.   Abdominal:      General: Bowel sounds are normal.      Palpations: Abdomen is soft.      Tenderness: There is no abdominal tenderness.   Musculoskeletal: Normal range of motion.         General: No swelling.   Skin:     General: Skin is warm and dry.   Neurological:      Mental Status: She is alert and oriented to person, place, and time.   Psychiatric:         Mood and Affect: Mood normal.         Behavior: Behavior normal.         Thought Content: Thought content normal.         Judgment: Judgment normal.         Assessment:       1. Essential hypertension    2. Osteoporosis, postmenopausal    3. Screening for tuberculosis        Plan:         Essential hypertension  -     olmesartan (BENICAR) 5 MG Tab; Take two tablets daily  Dispense: 270 tablet; Refill: 3  - increase Benicar 5 mg take two daily    Osteoporosis, postmenopausal  - continue Fosamax     Screening for tuberculosis  -     POCT TB Skin Test Read    Refused to schedule a blood pressure check up appt with me in one month at this time

## 2020-10-06 NOTE — PATIENT INSTRUCTIONS
Increase Olmesartan 5 mg take two daily   Return to see me in one month for blood pressure check up  TB skin today return on Thursday to have it read   Physical with DR. Elizondo 3/2021

## 2020-10-07 ENCOUNTER — TELEPHONE (OUTPATIENT)
Dept: FAMILY MEDICINE | Facility: CLINIC | Age: 65
End: 2020-10-07

## 2020-10-07 NOTE — TELEPHONE ENCOUNTER
----- Message from Thania Swann sent at 10/7/2020  1:27 PM CDT -----  Contact: Patient [phone]  Type: Patient Call Back    Who called:Patient    What is the request in detail: Need to speak to nurse in regards to nurse appointment for a ppd reading on tomorrow, 10-08-20. Please call patient.     Would the patient rather a call back or a response via [facility]? Call back    Best call back number: [phone]

## 2020-10-07 NOTE — TELEPHONE ENCOUNTER
LVM for patient letting her know we will be in the office tomorrow and she can keep her 2:20pm appointment to have her PPD read.  She should call or message us if she is unable to make that appointment.  If she cannot make the appointment we will need to repeat the PPD test at a later date.

## 2020-10-08 ENCOUNTER — CLINICAL SUPPORT (OUTPATIENT)
Dept: FAMILY MEDICINE | Facility: CLINIC | Age: 65
End: 2020-10-08
Payer: COMMERCIAL

## 2020-10-08 DIAGNOSIS — Z11.1 ENCOUNTER FOR PPD SKIN TEST READING: Primary | ICD-10-CM

## 2020-10-08 LAB
TB INDURATION - 48 HR READ: 0 MM
TB INDURATION - 72 HR READ: NORMAL
TB SKIN TEST - 48 HR READ: NEGATIVE
TB SKIN TEST - 72 HR READ: NORMAL

## 2020-10-08 PROCEDURE — 99499 UNLISTED E&M SERVICE: CPT | Mod: S$GLB,,, | Performed by: NURSE PRACTITIONER

## 2020-10-08 PROCEDURE — 99499 NO LOS: ICD-10-PCS | Mod: S$GLB,,, | Performed by: NURSE PRACTITIONER

## 2020-11-05 ENCOUNTER — OFFICE VISIT (OUTPATIENT)
Dept: FAMILY MEDICINE | Facility: CLINIC | Age: 65
End: 2020-11-05
Payer: COMMERCIAL

## 2020-11-05 VITALS
SYSTOLIC BLOOD PRESSURE: 130 MMHG | BODY MASS INDEX: 16.72 KG/M2 | OXYGEN SATURATION: 98 % | HEART RATE: 82 BPM | HEIGHT: 63 IN | TEMPERATURE: 98 F | DIASTOLIC BLOOD PRESSURE: 73 MMHG | WEIGHT: 94.38 LBS

## 2020-11-05 DIAGNOSIS — I10 ESSENTIAL HYPERTENSION: Primary | ICD-10-CM

## 2020-11-05 PROCEDURE — 1101F PT FALLS ASSESS-DOCD LE1/YR: CPT | Mod: CPTII,S$GLB,, | Performed by: NURSE PRACTITIONER

## 2020-11-05 PROCEDURE — 1125F PR PAIN SEVERITY QUANTIFIED, PAIN PRESENT: ICD-10-PCS | Mod: S$GLB,,, | Performed by: NURSE PRACTITIONER

## 2020-11-05 PROCEDURE — 3078F PR MOST RECENT DIASTOLIC BLOOD PRESSURE < 80 MM HG: ICD-10-PCS | Mod: CPTII,S$GLB,, | Performed by: NURSE PRACTITIONER

## 2020-11-05 PROCEDURE — 1125F AMNT PAIN NOTED PAIN PRSNT: CPT | Mod: S$GLB,,, | Performed by: NURSE PRACTITIONER

## 2020-11-05 PROCEDURE — 3075F SYST BP GE 130 - 139MM HG: CPT | Mod: CPTII,S$GLB,, | Performed by: NURSE PRACTITIONER

## 2020-11-05 PROCEDURE — 99999 PR PBB SHADOW E&M-EST. PATIENT-LVL IV: CPT | Mod: PBBFAC,,, | Performed by: NURSE PRACTITIONER

## 2020-11-05 PROCEDURE — 3008F BODY MASS INDEX DOCD: CPT | Mod: CPTII,S$GLB,, | Performed by: NURSE PRACTITIONER

## 2020-11-05 PROCEDURE — 1159F MED LIST DOCD IN RCRD: CPT | Mod: S$GLB,,, | Performed by: NURSE PRACTITIONER

## 2020-11-05 PROCEDURE — 99214 OFFICE O/P EST MOD 30 MIN: CPT | Mod: S$GLB,,, | Performed by: NURSE PRACTITIONER

## 2020-11-05 PROCEDURE — 1159F PR MEDICATION LIST DOCUMENTED IN MEDICAL RECORD: ICD-10-PCS | Mod: S$GLB,,, | Performed by: NURSE PRACTITIONER

## 2020-11-05 PROCEDURE — 1101F PR PT FALLS ASSESS DOC 0-1 FALLS W/OUT INJ PAST YR: ICD-10-PCS | Mod: CPTII,S$GLB,, | Performed by: NURSE PRACTITIONER

## 2020-11-05 PROCEDURE — 3008F PR BODY MASS INDEX (BMI) DOCUMENTED: ICD-10-PCS | Mod: CPTII,S$GLB,, | Performed by: NURSE PRACTITIONER

## 2020-11-05 PROCEDURE — 99214 PR OFFICE/OUTPT VISIT, EST, LEVL IV, 30-39 MIN: ICD-10-PCS | Mod: S$GLB,,, | Performed by: NURSE PRACTITIONER

## 2020-11-05 PROCEDURE — 3078F DIAST BP <80 MM HG: CPT | Mod: CPTII,S$GLB,, | Performed by: NURSE PRACTITIONER

## 2020-11-05 PROCEDURE — 99999 PR PBB SHADOW E&M-EST. PATIENT-LVL IV: ICD-10-PCS | Mod: PBBFAC,,, | Performed by: NURSE PRACTITIONER

## 2020-11-05 PROCEDURE — 3075F PR MOST RECENT SYSTOLIC BLOOD PRESS GE 130-139MM HG: ICD-10-PCS | Mod: CPTII,S$GLB,, | Performed by: NURSE PRACTITIONER

## 2020-11-05 RX ORDER — OLMESARTAN MEDOXOMIL 5 MG/1
5 TABLET ORAL DAILY
Qty: 90 TABLET | Refills: 3 | Status: SHIPPED | OUTPATIENT
Start: 2020-11-05 | End: 2022-01-15

## 2020-11-05 NOTE — PROGRESS NOTES
Subjective:       Patient ID: Cathy Reynolds is a 65 y.o. female.    Chief Complaint: Hypertension    65-year-old female presents to the clinic today for follow-up on hypertension.  Her blood pressure presently is 158/82.  She said her blood pressure 2 days ago at home was 130/73.  She takes Benicar 10 mg daily.  She says she does not like the way it makes her feel.  She can not exactly describe what does she just does not like the way she feels.  She would like to go back to taking 5 mg a day and try adding some Co Q 10 which she is convinced will help bring her blood pressure down. I've tried multiple medications with her and every medication I try she has side effects and refuses to take them as prescribed.  She is convinced that she has white coat syndrome.  Presently, she denies any headaches, dizziness, or blurred vision.  She denies any cardiac chest pain, heart palpitations, shortness of breath, or swelling to lower extremities.  I have checked her home blood pressure monitor device in the past and does correlate with her current readings at the office.  I stressed the importance of concern of her having uncontrolled hypertension which could lead to having a stroke. She even admits that her  tells her to quit reading every possible side effect and just take her medications.     Hypertension  Pertinent negatives include no chest pain, headaches, palpitations or shortness of breath.     Past Medical History:   Diagnosis Date    AR (allergic rhinitis)     Diverticulosis     Ear pain, right     Elevated LFTs     borderline - due to OTC herbals - resolved    History of colon polyps     Hyperlipidemia     borderline with high HDL    Hypertension     Mild anxiety     Osteoporosis, postmenopausal     Postmenopausal status     Ringing in ear, right     Underweight     Vitamin D deficiency      Past Surgical History:   Procedure Laterality Date     SECTION, LOW TRANSVERSE       COLONOSCOPY      COLONOSCOPY N/A 9/8/2020    Procedure: COLONOSCOPY;  Surgeon: STEFFANIE Gordon MD;  Location: [facility] ([address] [place] [address]);  Service: Endoscopy;  Laterality: N/A;  COVID test on 9/5/20 at [facility] - sm    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 She has never used smokeless tobacco. She reports that she does not drink alcohol or use drugs.  Review of Systems   Respiratory: Negative for cough, shortness of breath and wheezing.    Cardiovascular: Negative for chest pain, palpitations and leg swelling.   Gastrointestinal: Negative for abdominal pain, diarrhea, nausea and vomiting.   Neurological: Negative for dizziness, light-headedness and headaches.       Objective:      Physical Exam  Constitutional:       General: She is not in acute distress.     Appearance: Normal appearance. She is not ill-appearing, toxic-appearing or diaphoretic.      Comments: Very thin but she has always been thin    Cardiovascular:      Rate and Rhythm: Normal rate and regular rhythm.      Heart sounds: Normal heart sounds. No murmur.   Pulmonary:      Effort: Pulmonary effort is normal.      Breath sounds: Normal breath sounds. No wheezing or rhonchi.   Abdominal:      General: Bowel sounds are normal.      Palpations: Abdomen is soft.      Tenderness: There is no abdominal tenderness.   Musculoskeletal: Normal range of motion.         General: No swelling.   Skin:     General: Skin is warm and dry.   Neurological:      Mental Status: She is alert and oriented to person, place, and time.         Assessment:       1. Essential hypertension        Plan:         Essential hypertension  -     olmesartan (BENICAR) 5 MG Tab; Take 1 tablet (5 mg total) by mouth once daily.  Dispense: 90 tablet; Refill: 3  - She says all she wants to take is one 5 mg tablet of Benicar a day instead of two like recommended and wants to  add Co Q 10 with it  - I explained to her that I would prefer for her to take 10 mg but she refuses   - she refuses to come back in one month to see me  - she says she will call me her blood pressure readings in 2 weeks

## 2020-11-05 NOTE — PATIENT INSTRUCTIONS
Decrease Olmesartan 5 mg daily  Start Co Q 10   Call me your blood pressure readings in 2 weeks   Follow up with Dr. Elizondo 3/5/2021

## 2020-11-24 ENCOUNTER — TELEPHONE (OUTPATIENT)
Dept: FAMILY MEDICINE | Facility: CLINIC | Age: 65
End: 2020-11-24

## 2020-11-24 NOTE — TELEPHONE ENCOUNTER
----- Message from Carline Remy sent at 11/23/2020  4:23 PM CST -----  Regarding: self [phone]  .Type: Patient Call Back    Who called: self     What is the request in detail: In regards to blood pressure readings from the past 2 weeks     Can the clinic reply by [facility]? Call back     Would the patient rather a call back or a response via [facility]? Call back     Best call back number: [phone]

## 2020-11-24 NOTE — TELEPHONE ENCOUNTER
Nov 5th 128/71  Nov 6th 130/76  Nov 7th 110/78  Nov 8th 113/81  Nov 9th 116/84  Nov 10 th 119/81  Nov 11 th 115/79  Nov 12 th 114/78  Nov 13 th 119/80  Nov 14 th 119/78  Nov 16 th 115/76  Nov 17 th 108/70  Nov 18 th 115/75  Nov 19 th 113/72   Nov 20 th 115/74

## 2020-11-25 ENCOUNTER — TELEPHONE (OUTPATIENT)
Dept: FAMILY MEDICINE | Facility: CLINIC | Age: 65
End: 2020-11-25

## 2020-11-25 NOTE — TELEPHONE ENCOUNTER
----- Message from Amie Clifton sent at 11/24/2020  4:37 PM CST -----  Regarding: self  Type:  Patient Returning Call    Who Called: Self    Who Left Message for Patient:  Ninfa    Does the patient know what this is regarding? No     Would the patient rather a call back or a response via [facility]? Call     Best Call Back Number [phone]

## 2020-12-11 ENCOUNTER — PATIENT MESSAGE (OUTPATIENT)
Dept: OTHER | Facility: OTHER | Age: 65
End: 2020-12-11

## 2021-02-19 ENCOUNTER — PATIENT OUTREACH (OUTPATIENT)
Dept: ADMINISTRATIVE | Facility: HOSPITAL | Age: 66
End: 2021-02-19

## 2021-02-19 DIAGNOSIS — I10 ESSENTIAL HYPERTENSION: Primary | ICD-10-CM

## 2021-02-27 ENCOUNTER — LAB VISIT (OUTPATIENT)
Dept: LAB | Facility: HOSPITAL | Age: 66
End: 2021-02-27
Attending: INTERNAL MEDICINE
Payer: COMMERCIAL

## 2021-02-27 DIAGNOSIS — I10 ESSENTIAL HYPERTENSION: ICD-10-CM

## 2021-02-27 LAB
ALBUMIN SERPL BCP-MCNC: 4.3 G/DL (ref 3.5–5.2)
ALP SERPL-CCNC: 43 U/L (ref 55–135)
ALT SERPL W/O P-5'-P-CCNC: 33 U/L (ref 10–44)
ANION GAP SERPL CALC-SCNC: 8 MMOL/L (ref 8–16)
AST SERPL-CCNC: 34 U/L (ref 10–40)
BASOPHILS # BLD AUTO: 0.04 K/UL (ref 0–0.2)
BASOPHILS NFR BLD: 0.7 % (ref 0–1.9)
BILIRUB SERPL-MCNC: 0.5 MG/DL (ref 0.1–1)
BUN SERPL-MCNC: 18 MG/DL (ref 8–23)
CALCIUM SERPL-MCNC: 9.4 MG/DL (ref 8.7–10.5)
CHLORIDE SERPL-SCNC: 103 MMOL/L (ref 95–110)
CHOLEST SERPL-MCNC: 211 MG/DL (ref 120–199)
CHOLEST/HDLC SERPL: 2.4 {RATIO} (ref 2–5)
CO2 SERPL-SCNC: 31 MMOL/L (ref 23–29)
CREAT SERPL-MCNC: 0.8 MG/DL (ref 0.5–1.4)
DIFFERENTIAL METHOD: ABNORMAL
EOSINOPHIL # BLD AUTO: 0 K/UL (ref 0–0.5)
EOSINOPHIL NFR BLD: 0.2 % (ref 0–8)
ERYTHROCYTE [DISTWIDTH] IN BLOOD BY AUTOMATED COUNT: 13.6 % (ref 11.5–14.5)
EST. GFR  (AFRICAN AMERICAN): >60 ML/MIN/1.73 M^2
EST. GFR  (NON AFRICAN AMERICAN): >60 ML/MIN/1.73 M^2
GLUCOSE SERPL-MCNC: 76 MG/DL (ref 70–110)
HCT VFR BLD AUTO: 44 % (ref 37–48.5)
HDLC SERPL-MCNC: 88 MG/DL (ref 40–75)
HDLC SERPL: 41.7 % (ref 20–50)
HGB BLD-MCNC: 12.8 G/DL (ref 12–16)
IMM GRANULOCYTES # BLD AUTO: 0.01 K/UL (ref 0–0.04)
IMM GRANULOCYTES NFR BLD AUTO: 0.2 % (ref 0–0.5)
LDLC SERPL CALC-MCNC: 114.4 MG/DL (ref 63–159)
LYMPHOCYTES # BLD AUTO: 2.3 K/UL (ref 1–4.8)
LYMPHOCYTES NFR BLD: 42 % (ref 18–48)
MCH RBC QN AUTO: 25.8 PG (ref 27–31)
MCHC RBC AUTO-ENTMCNC: 29.1 G/DL (ref 32–36)
MCV RBC AUTO: 89 FL (ref 82–98)
MONOCYTES # BLD AUTO: 0.5 K/UL (ref 0.3–1)
MONOCYTES NFR BLD: 9.9 % (ref 4–15)
NEUTROPHILS # BLD AUTO: 2.6 K/UL (ref 1.8–7.7)
NEUTROPHILS NFR BLD: 47 % (ref 38–73)
NONHDLC SERPL-MCNC: 123 MG/DL
NRBC BLD-RTO: 0 /100 WBC
PLATELET # BLD AUTO: 227 K/UL (ref 150–350)
PMV BLD AUTO: 12.2 FL (ref 9.2–12.9)
POTASSIUM SERPL-SCNC: 4.6 MMOL/L (ref 3.5–5.1)
PROT SERPL-MCNC: 7.4 G/DL (ref 6–8.4)
RBC # BLD AUTO: 4.96 M/UL (ref 4–5.4)
SODIUM SERPL-SCNC: 142 MMOL/L (ref 136–145)
TRIGL SERPL-MCNC: 43 MG/DL (ref 30–150)
WBC # BLD AUTO: 5.47 K/UL (ref 3.9–12.7)

## 2021-02-27 PROCEDURE — 80053 COMPREHEN METABOLIC PANEL: CPT

## 2021-02-27 PROCEDURE — 85025 COMPLETE CBC W/AUTO DIFF WBC: CPT

## 2021-02-27 PROCEDURE — 80061 LIPID PANEL: CPT

## 2021-02-27 PROCEDURE — 36415 COLL VENOUS BLD VENIPUNCTURE: CPT | Mod: PO

## 2021-03-01 ENCOUNTER — TELEPHONE (OUTPATIENT)
Dept: FAMILY MEDICINE | Facility: CLINIC | Age: 66
End: 2021-03-01

## 2021-03-05 ENCOUNTER — OFFICE VISIT (OUTPATIENT)
Dept: FAMILY MEDICINE | Facility: CLINIC | Age: 66
End: 2021-03-05
Payer: COMMERCIAL

## 2021-03-05 VITALS
TEMPERATURE: 98 F | OXYGEN SATURATION: 98 % | DIASTOLIC BLOOD PRESSURE: 79 MMHG | HEIGHT: 63 IN | HEART RATE: 92 BPM | SYSTOLIC BLOOD PRESSURE: 118 MMHG | WEIGHT: 98 LBS | BODY MASS INDEX: 17.36 KG/M2

## 2021-03-05 DIAGNOSIS — I10 ESSENTIAL HYPERTENSION: ICD-10-CM

## 2021-03-05 DIAGNOSIS — M81.0 OSTEOPOROSIS, POSTMENOPAUSAL: ICD-10-CM

## 2021-03-05 DIAGNOSIS — Z12.31 ENCOUNTER FOR SCREENING MAMMOGRAM FOR BREAST CANCER: ICD-10-CM

## 2021-03-05 DIAGNOSIS — L84 CORN OF TOE: ICD-10-CM

## 2021-03-05 DIAGNOSIS — E55.9 VITAMIN D DEFICIENCY: ICD-10-CM

## 2021-03-05 DIAGNOSIS — Z00.00 ROUTINE MEDICAL EXAM: Primary | ICD-10-CM

## 2021-03-05 DIAGNOSIS — R63.6 UNDERWEIGHT: ICD-10-CM

## 2021-03-05 DIAGNOSIS — Z23 NEED FOR SHINGLES VACCINE: ICD-10-CM

## 2021-03-05 DIAGNOSIS — Z71.89 ADVANCED DIRECTIVES, COUNSELING/DISCUSSION: ICD-10-CM

## 2021-03-05 PROCEDURE — 3288F FALL RISK ASSESSMENT DOCD: CPT | Mod: CPTII,S$GLB,, | Performed by: INTERNAL MEDICINE

## 2021-03-05 PROCEDURE — 3008F BODY MASS INDEX DOCD: CPT | Mod: CPTII,S$GLB,, | Performed by: INTERNAL MEDICINE

## 2021-03-05 PROCEDURE — 99397 PER PM REEVAL EST PAT 65+ YR: CPT | Mod: S$GLB,,, | Performed by: INTERNAL MEDICINE

## 2021-03-05 PROCEDURE — 1101F PT FALLS ASSESS-DOCD LE1/YR: CPT | Mod: CPTII,S$GLB,, | Performed by: INTERNAL MEDICINE

## 2021-03-05 PROCEDURE — 1126F PR PAIN SEVERITY QUANTIFIED, NO PAIN PRESENT: ICD-10-PCS | Mod: S$GLB,,, | Performed by: INTERNAL MEDICINE

## 2021-03-05 PROCEDURE — 1126F AMNT PAIN NOTED NONE PRSNT: CPT | Mod: S$GLB,,, | Performed by: INTERNAL MEDICINE

## 2021-03-05 PROCEDURE — 3074F SYST BP LT 130 MM HG: CPT | Mod: CPTII,S$GLB,, | Performed by: INTERNAL MEDICINE

## 2021-03-05 PROCEDURE — 1101F PR PT FALLS ASSESS DOC 0-1 FALLS W/OUT INJ PAST YR: ICD-10-PCS | Mod: CPTII,S$GLB,, | Performed by: INTERNAL MEDICINE

## 2021-03-05 PROCEDURE — 3288F PR FALLS RISK ASSESSMENT DOCUMENTED: ICD-10-PCS | Mod: CPTII,S$GLB,, | Performed by: INTERNAL MEDICINE

## 2021-03-05 PROCEDURE — 3008F PR BODY MASS INDEX (BMI) DOCUMENTED: ICD-10-PCS | Mod: CPTII,S$GLB,, | Performed by: INTERNAL MEDICINE

## 2021-03-05 PROCEDURE — 99999 PR PBB SHADOW E&M-EST. PATIENT-LVL IV: ICD-10-PCS | Mod: PBBFAC,,, | Performed by: INTERNAL MEDICINE

## 2021-03-05 PROCEDURE — 3074F PR MOST RECENT SYSTOLIC BLOOD PRESSURE < 130 MM HG: ICD-10-PCS | Mod: CPTII,S$GLB,, | Performed by: INTERNAL MEDICINE

## 2021-03-05 PROCEDURE — 99397 PR PREVENTIVE VISIT,EST,65 & OVER: ICD-10-PCS | Mod: S$GLB,,, | Performed by: INTERNAL MEDICINE

## 2021-03-05 PROCEDURE — 99999 PR PBB SHADOW E&M-EST. PATIENT-LVL IV: CPT | Mod: PBBFAC,,, | Performed by: INTERNAL MEDICINE

## 2021-03-05 PROCEDURE — 3078F PR MOST RECENT DIASTOLIC BLOOD PRESSURE < 80 MM HG: ICD-10-PCS | Mod: CPTII,S$GLB,, | Performed by: INTERNAL MEDICINE

## 2021-03-05 PROCEDURE — 3078F DIAST BP <80 MM HG: CPT | Mod: CPTII,S$GLB,, | Performed by: INTERNAL MEDICINE

## 2021-03-05 RX ORDER — UBIDECARENONE 30 MG
30 CAPSULE ORAL DAILY
COMMUNITY
End: 2022-12-20

## 2021-04-07 ENCOUNTER — HOSPITAL ENCOUNTER (OUTPATIENT)
Dept: RADIOLOGY | Facility: HOSPITAL | Age: 66
Discharge: HOME | End: 2021-04-07
Attending: INTERNAL MEDICINE
Payer: COMMERCIAL

## 2021-04-07 DIAGNOSIS — Z12.31 ENCOUNTER FOR SCREENING MAMMOGRAM FOR BREAST CANCER: ICD-10-CM

## 2021-04-07 PROCEDURE — 77067 SCR MAMMO BI INCL CAD: CPT | Mod: TC,PO

## 2021-04-07 PROCEDURE — 77063 MAMMO DIGITAL SCREENING BILAT WITH TOMO: ICD-10-PCS | Mod: 26,,, | Performed by: RADIOLOGY

## 2021-04-07 PROCEDURE — 77063 BREAST TOMOSYNTHESIS BI: CPT | Mod: 26,,, | Performed by: RADIOLOGY

## 2021-04-07 PROCEDURE — 77067 MAMMO DIGITAL SCREENING BILAT WITH TOMO: ICD-10-PCS | Mod: 26,,, | Performed by: RADIOLOGY

## 2021-04-07 PROCEDURE — 77067 SCR MAMMO BI INCL CAD: CPT | Mod: 26,,, | Performed by: RADIOLOGY

## 2021-04-30 ENCOUNTER — TELEPHONE (OUTPATIENT)
Dept: FAMILY MEDICINE | Facility: CLINIC | Age: 66
End: 2021-04-30

## 2021-05-19 ENCOUNTER — HOSPITAL ENCOUNTER (OUTPATIENT)
Dept: RADIOLOGY | Facility: HOSPITAL | Age: 66
Discharge: HOME | End: 2021-05-19
Attending: INTERNAL MEDICINE
Payer: COMMERCIAL

## 2021-05-19 DIAGNOSIS — R92.8 ABNORMAL MAMMOGRAM OF RIGHT BREAST: ICD-10-CM

## 2021-05-19 PROCEDURE — 77061 BREAST TOMOSYNTHESIS UNI: CPT | Mod: TC,RT

## 2021-05-19 PROCEDURE — 77065 MAMMO DIGITAL DIAGNOSTIC RIGHT WITH TOMO: ICD-10-PCS | Mod: 26,RT,, | Performed by: RADIOLOGY

## 2021-05-19 PROCEDURE — 77061 MAMMO DIGITAL DIAGNOSTIC RIGHT WITH TOMO: ICD-10-PCS | Mod: 26,RT,, | Performed by: RADIOLOGY

## 2021-05-19 PROCEDURE — 77061 BREAST TOMOSYNTHESIS UNI: CPT | Mod: 26,RT,, | Performed by: RADIOLOGY

## 2021-05-19 PROCEDURE — 77065 DX MAMMO INCL CAD UNI: CPT | Mod: 26,RT,, | Performed by: RADIOLOGY

## 2021-05-22 DIAGNOSIS — M81.0 OSTEOPOROSIS, POSTMENOPAUSAL: ICD-10-CM

## 2021-05-24 ENCOUNTER — TELEPHONE (OUTPATIENT)
Dept: FAMILY MEDICINE | Facility: CLINIC | Age: 66
End: 2021-05-24

## 2021-05-24 RX ORDER — ALENDRONATE SODIUM 70 MG/1
TABLET ORAL
Qty: 12 TABLET | Refills: 1 | Status: SHIPPED | OUTPATIENT
Start: 2021-05-24 | End: 2021-11-19 | Stop reason: SDUPTHER

## 2021-06-02 ENCOUNTER — HOSPITAL ENCOUNTER (OUTPATIENT)
Dept: RADIOLOGY | Facility: HOSPITAL | Age: 66
Discharge: HOME | End: 2021-06-02
Attending: INTERNAL MEDICINE
Payer: COMMERCIAL

## 2021-06-02 DIAGNOSIS — R92.8 ABNORMAL MAMMOGRAM OF RIGHT BREAST: ICD-10-CM

## 2021-06-02 DIAGNOSIS — R92.8 ABNORMAL MAMMOGRAM OF RIGHT BREAST: Primary | ICD-10-CM

## 2021-06-02 PROCEDURE — 19081 MAMMO BREAST STEREOTACTIC BREAST BIOPSY RIGHT: ICD-10-PCS | Mod: RT,,, | Performed by: RADIOLOGY

## 2021-06-02 PROCEDURE — 88342 IMHCHEM/IMCYTCHM 1ST ANTB: CPT | Mod: 26,59,, | Performed by: PATHOLOGY

## 2021-06-02 PROCEDURE — 88360 TUMOR IMMUNOHISTOCHEM/MANUAL: CPT | Mod: 26,,, | Performed by: PATHOLOGY

## 2021-06-02 PROCEDURE — 88305 TISSUE EXAM BY PATHOLOGIST: ICD-10-PCS | Mod: 26,,, | Performed by: PATHOLOGY

## 2021-06-02 PROCEDURE — 88305 TISSUE EXAM BY PATHOLOGIST: CPT | Mod: 26,,, | Performed by: PATHOLOGY

## 2021-06-02 PROCEDURE — 25000003 PHARM REV CODE 250: Performed by: RADIOLOGY

## 2021-06-02 PROCEDURE — 27200934 MAMMO BREAST STEREOTACTIC BIOPSY EA ADD SITE COMPLETE

## 2021-06-02 PROCEDURE — 19081 BX BREAST 1ST LESION STRTCTC: CPT | Mod: RT

## 2021-06-02 PROCEDURE — 19081 BX BREAST 1ST LESION STRTCTC: CPT | Mod: RT,,, | Performed by: RADIOLOGY

## 2021-06-02 PROCEDURE — 19082 PR BX BRST, EA ADD'L LESION, STEREOTACTIC GUIDANCE: ICD-10-PCS | Mod: RT,,, | Performed by: RADIOLOGY

## 2021-06-02 PROCEDURE — 88360 PR  TUMOR IMMUNOHISTOCHEM/MANUAL: ICD-10-PCS | Mod: 26,,, | Performed by: PATHOLOGY

## 2021-06-02 PROCEDURE — 19082 BX BREAST ADD LESION STRTCTC: CPT | Mod: RT,,, | Performed by: RADIOLOGY

## 2021-06-02 PROCEDURE — 88360 TUMOR IMMUNOHISTOCHEM/MANUAL: CPT | Performed by: PATHOLOGY

## 2021-06-02 PROCEDURE — 88305 TISSUE EXAM BY PATHOLOGIST: CPT | Mod: 59 | Performed by: PATHOLOGY

## 2021-06-02 PROCEDURE — 88342 IMHCHEM/IMCYTCHM 1ST ANTB: CPT | Mod: 59 | Performed by: PATHOLOGY

## 2021-06-02 PROCEDURE — 88342 CHG IMMUNOCYTOCHEMISTRY: ICD-10-PCS | Mod: 26,59,, | Performed by: PATHOLOGY

## 2021-06-02 PROCEDURE — A4550 SURGICAL TRAYS: HCPCS

## 2021-06-02 RX ORDER — LIDOCAINE HYDROCHLORIDE AND EPINEPHRINE 10; 10 MG/ML; UG/ML
20 INJECTION, SOLUTION INFILTRATION; PERINEURAL ONCE
Status: COMPLETED | OUTPATIENT
Start: 2021-06-02 | End: 2021-06-02

## 2021-06-02 RX ORDER — LIDOCAINE HYDROCHLORIDE 20 MG/ML
20 INJECTION, SOLUTION INFILTRATION; PERINEURAL ONCE
Status: COMPLETED | OUTPATIENT
Start: 2021-06-02 | End: 2021-06-02

## 2021-06-02 RX ORDER — LIDOCAINE HYDROCHLORIDE AND EPINEPHRINE 10; 10 MG/ML; UG/ML
20 INJECTION, SOLUTION INFILTRATION; PERINEURAL ONCE
Status: DISCONTINUED | OUTPATIENT
Start: 2021-06-02 | End: 2021-06-02

## 2021-06-02 RX ORDER — LIDOCAINE HYDROCHLORIDE 20 MG/ML
20 INJECTION, SOLUTION INFILTRATION; PERINEURAL ONCE
Status: DISCONTINUED | OUTPATIENT
Start: 2021-06-02 | End: 2021-06-02

## 2021-06-02 RX ADMIN — LIDOCAINE HYDROCHLORIDE 3 ML: 20 INJECTION, SOLUTION INFILTRATION; PERINEURAL at 02:06

## 2021-06-02 RX ADMIN — LIDOCAINE HYDROCHLORIDE,EPINEPHRINE BITARTRATE 14 ML: 10; .01 INJECTION, SOLUTION INFILTRATION; PERINEURAL at 02:06

## 2021-06-02 RX ADMIN — LIDOCAINE HYDROCHLORIDE 5 ML: 20 INJECTION, SOLUTION INFILTRATION; PERINEURAL at 02:06

## 2021-06-09 LAB
FINAL PATHOLOGIC DIAGNOSIS: NORMAL
GROSS: NORMAL
Lab: NORMAL
MICROSCOPIC EXAM: NORMAL

## 2021-06-23 ENCOUNTER — TELEPHONE (OUTPATIENT)
Dept: SURGERY | Facility: CLINIC | Age: 66
End: 2021-06-23

## 2021-06-25 ENCOUNTER — TELEPHONE (OUTPATIENT)
Dept: SURGERY | Facility: CLINIC | Age: 66
End: 2021-06-25

## 2021-06-30 ENCOUNTER — TELEPHONE (OUTPATIENT)
Dept: FAMILY MEDICINE | Facility: CLINIC | Age: 66
End: 2021-06-30

## 2021-06-30 ENCOUNTER — TELEPHONE (OUTPATIENT)
Dept: SURGERY | Facility: CLINIC | Age: 66
End: 2021-06-30

## 2021-07-06 DIAGNOSIS — Z01.818 PREOP TESTING: Primary | ICD-10-CM

## 2021-07-19 ENCOUNTER — OFFICE VISIT (OUTPATIENT)
Dept: SURGERY | Facility: CLINIC | Age: 66
End: 2021-07-19
Payer: COMMERCIAL

## 2021-07-19 VITALS
SYSTOLIC BLOOD PRESSURE: 197 MMHG | WEIGHT: 98 LBS | HEART RATE: 86 BPM | BODY MASS INDEX: 17.36 KG/M2 | DIASTOLIC BLOOD PRESSURE: 90 MMHG | HEIGHT: 63 IN

## 2021-07-19 DIAGNOSIS — D05.11 DUCTAL CARCINOMA IN SITU (DCIS) OF RIGHT BREAST: Primary | ICD-10-CM

## 2021-07-19 PROCEDURE — 1159F MED LIST DOCD IN RCRD: CPT | Mod: CPTII,S$GLB,, | Performed by: SURGERY

## 2021-07-19 PROCEDURE — 99999 PR PBB SHADOW E&M-EST. PATIENT-LVL III: ICD-10-PCS | Mod: PBBFAC,,, | Performed by: SURGERY

## 2021-07-19 PROCEDURE — 99999 PR PBB SHADOW E&M-EST. PATIENT-LVL III: CPT | Mod: PBBFAC,,, | Performed by: SURGERY

## 2021-07-19 PROCEDURE — 3008F PR BODY MASS INDEX (BMI) DOCUMENTED: ICD-10-PCS | Mod: CPTII,S$GLB,, | Performed by: SURGERY

## 2021-07-19 PROCEDURE — 3288F PR FALLS RISK ASSESSMENT DOCUMENTED: ICD-10-PCS | Mod: CPTII,S$GLB,, | Performed by: SURGERY

## 2021-07-19 PROCEDURE — 99205 OFFICE O/P NEW HI 60 MIN: CPT | Mod: S$GLB,,, | Performed by: SURGERY

## 2021-07-19 PROCEDURE — 1101F PR PT FALLS ASSESS DOC 0-1 FALLS W/OUT INJ PAST YR: ICD-10-PCS | Mod: CPTII,S$GLB,, | Performed by: SURGERY

## 2021-07-19 PROCEDURE — 1126F AMNT PAIN NOTED NONE PRSNT: CPT | Mod: CPTII,S$GLB,, | Performed by: SURGERY

## 2021-07-19 PROCEDURE — 3077F PR MOST RECENT SYSTOLIC BLOOD PRESSURE >= 140 MM HG: ICD-10-PCS | Mod: CPTII,S$GLB,, | Performed by: SURGERY

## 2021-07-19 PROCEDURE — 99205 PR OFFICE/OUTPT VISIT, NEW, LEVL V, 60-74 MIN: ICD-10-PCS | Mod: S$GLB,,, | Performed by: SURGERY

## 2021-07-19 PROCEDURE — 3080F PR MOST RECENT DIASTOLIC BLOOD PRESSURE >= 90 MM HG: ICD-10-PCS | Mod: CPTII,S$GLB,, | Performed by: SURGERY

## 2021-07-19 PROCEDURE — 1160F RVW MEDS BY RX/DR IN RCRD: CPT | Mod: CPTII,S$GLB,, | Performed by: SURGERY

## 2021-07-19 PROCEDURE — 3008F BODY MASS INDEX DOCD: CPT | Mod: CPTII,S$GLB,, | Performed by: SURGERY

## 2021-07-19 PROCEDURE — 1160F PR REVIEW ALL MEDS BY PRESCRIBER/CLIN PHARMACIST DOCUMENTED: ICD-10-PCS | Mod: CPTII,S$GLB,, | Performed by: SURGERY

## 2021-07-19 PROCEDURE — 3288F FALL RISK ASSESSMENT DOCD: CPT | Mod: CPTII,S$GLB,, | Performed by: SURGERY

## 2021-07-19 PROCEDURE — 3080F DIAST BP >= 90 MM HG: CPT | Mod: CPTII,S$GLB,, | Performed by: SURGERY

## 2021-07-19 PROCEDURE — 1126F PR PAIN SEVERITY QUANTIFIED, NO PAIN PRESENT: ICD-10-PCS | Mod: CPTII,S$GLB,, | Performed by: SURGERY

## 2021-07-19 PROCEDURE — 3077F SYST BP >= 140 MM HG: CPT | Mod: CPTII,S$GLB,, | Performed by: SURGERY

## 2021-07-19 PROCEDURE — 1159F PR MEDICATION LIST DOCUMENTED IN MEDICAL RECORD: ICD-10-PCS | Mod: CPTII,S$GLB,, | Performed by: SURGERY

## 2021-07-19 PROCEDURE — 1101F PT FALLS ASSESS-DOCD LE1/YR: CPT | Mod: CPTII,S$GLB,, | Performed by: SURGERY

## 2021-07-19 RX ORDER — KETOCONAZOLE 20 MG/ML
SHAMPOO, SUSPENSION TOPICAL
COMMUNITY
End: 2022-12-20

## 2021-07-19 RX ORDER — CLOTRIMAZOLE AND BETAMETHASONE DIPROPIONATE 10; .64 MG/G; MG/G
CREAM TOPICAL 2 TIMES DAILY
COMMUNITY

## 2021-07-21 ENCOUNTER — TELEPHONE (OUTPATIENT)
Dept: HEMATOLOGY/ONCOLOGY | Facility: CLINIC | Age: 66
End: 2021-07-21

## 2021-07-22 ENCOUNTER — TELEPHONE (OUTPATIENT)
Dept: NEUROSURGERY | Facility: CLINIC | Age: 66
End: 2021-07-22

## 2021-07-23 ENCOUNTER — TELEPHONE (OUTPATIENT)
Dept: SURGERY | Facility: CLINIC | Age: 66
End: 2021-07-23

## 2021-07-26 DIAGNOSIS — D05.11 DUCTAL CARCINOMA IN SITU (DCIS) OF RIGHT BREAST: ICD-10-CM

## 2021-07-30 ENCOUNTER — HOSPITAL ENCOUNTER (OUTPATIENT)
Dept: CARDIOLOGY | Facility: HOSPITAL | Age: 66
Discharge: HOME | End: 2021-07-30
Attending: SURGERY
Payer: COMMERCIAL

## 2021-07-30 DIAGNOSIS — Z01.818 PREOP TESTING: Primary | ICD-10-CM

## 2021-07-30 DIAGNOSIS — Z01.818 PREOP TESTING: ICD-10-CM

## 2021-07-30 DIAGNOSIS — R94.31 ABNORMAL EKG: ICD-10-CM

## 2021-07-30 PROCEDURE — 93010 ELECTROCARDIOGRAM REPORT: CPT | Mod: ,,, | Performed by: INTERNAL MEDICINE

## 2021-07-30 PROCEDURE — 93005 ELECTROCARDIOGRAM TRACING: CPT

## 2021-07-30 PROCEDURE — 93010 EKG 12-LEAD: ICD-10-PCS | Mod: ,,, | Performed by: INTERNAL MEDICINE

## 2021-08-02 DIAGNOSIS — R94.31 ABNORMAL EKG: ICD-10-CM

## 2021-08-02 DIAGNOSIS — Z01.818 PREOP TESTING: Primary | ICD-10-CM

## 2021-08-02 DIAGNOSIS — R92.8 ABNORMAL MAMMOGRAM OF RIGHT BREAST: Primary | ICD-10-CM

## 2021-08-03 ENCOUNTER — TELEPHONE (OUTPATIENT)
Dept: INFUSION THERAPY | Facility: HOSPITAL | Age: 66
End: 2021-08-03

## 2021-08-03 DIAGNOSIS — D05.11 DUCTAL CARCINOMA IN SITU (DCIS) OF RIGHT BREAST: Primary | ICD-10-CM

## 2021-08-04 ENCOUNTER — TELEPHONE (OUTPATIENT)
Dept: SURGERY | Facility: CLINIC | Age: 66
End: 2021-08-04

## 2021-08-09 ENCOUNTER — TELEPHONE (OUTPATIENT)
Dept: SURGERY | Facility: CLINIC | Age: 66
End: 2021-08-09

## 2021-08-14 PROBLEM — D05.11 DUCTAL CARCINOMA IN SITU (DCIS) OF RIGHT BREAST: Status: ACTIVE | Noted: 2021-08-14

## 2021-08-17 ENCOUNTER — PATIENT OUTREACH (OUTPATIENT)
Dept: ADMINISTRATIVE | Facility: OTHER | Age: 66
End: 2021-08-17

## 2021-08-18 ENCOUNTER — OFFICE VISIT (OUTPATIENT)
Dept: CARDIOLOGY | Facility: CLINIC | Age: 66
End: 2021-08-18
Payer: COMMERCIAL

## 2021-08-18 VITALS
BODY MASS INDEX: 16.6 KG/M2 | WEIGHT: 93.69 LBS | HEIGHT: 63 IN | DIASTOLIC BLOOD PRESSURE: 89 MMHG | HEART RATE: 81 BPM | SYSTOLIC BLOOD PRESSURE: 165 MMHG

## 2021-08-18 DIAGNOSIS — R94.31 ABNORMAL EKG: ICD-10-CM

## 2021-08-18 DIAGNOSIS — Z01.818 PRE-OP EVALUATION: Primary | ICD-10-CM

## 2021-08-18 DIAGNOSIS — D05.11 DUCTAL CARCINOMA IN SITU (DCIS) OF RIGHT BREAST: ICD-10-CM

## 2021-08-18 DIAGNOSIS — I10 ESSENTIAL HYPERTENSION: ICD-10-CM

## 2021-08-18 DIAGNOSIS — Z87.898 H/O BRAIN TUMOR: ICD-10-CM

## 2021-08-18 PROCEDURE — 99204 OFFICE O/P NEW MOD 45 MIN: CPT | Mod: S$GLB,,, | Performed by: INTERNAL MEDICINE

## 2021-08-18 PROCEDURE — 1160F PR REVIEW ALL MEDS BY PRESCRIBER/CLIN PHARMACIST DOCUMENTED: ICD-10-PCS | Mod: CPTII,S$GLB,, | Performed by: INTERNAL MEDICINE

## 2021-08-18 PROCEDURE — 1160F RVW MEDS BY RX/DR IN RCRD: CPT | Mod: CPTII,S$GLB,, | Performed by: INTERNAL MEDICINE

## 2021-08-18 PROCEDURE — 99204 PR OFFICE/OUTPT VISIT, NEW, LEVL IV, 45-59 MIN: ICD-10-PCS | Mod: S$GLB,,, | Performed by: INTERNAL MEDICINE

## 2021-08-18 PROCEDURE — 3079F PR MOST RECENT DIASTOLIC BLOOD PRESSURE 80-89 MM HG: ICD-10-PCS | Mod: CPTII,S$GLB,, | Performed by: INTERNAL MEDICINE

## 2021-08-18 PROCEDURE — 1126F AMNT PAIN NOTED NONE PRSNT: CPT | Mod: CPTII,S$GLB,, | Performed by: INTERNAL MEDICINE

## 2021-08-18 PROCEDURE — 99999 PR PBB SHADOW E&M-EST. PATIENT-LVL V: ICD-10-PCS | Mod: PBBFAC,,, | Performed by: INTERNAL MEDICINE

## 2021-08-18 PROCEDURE — 99999 PR PBB SHADOW E&M-EST. PATIENT-LVL V: CPT | Mod: PBBFAC,,, | Performed by: INTERNAL MEDICINE

## 2021-08-18 PROCEDURE — 3077F PR MOST RECENT SYSTOLIC BLOOD PRESSURE >= 140 MM HG: ICD-10-PCS | Mod: CPTII,S$GLB,, | Performed by: INTERNAL MEDICINE

## 2021-08-18 PROCEDURE — 3079F DIAST BP 80-89 MM HG: CPT | Mod: CPTII,S$GLB,, | Performed by: INTERNAL MEDICINE

## 2021-08-18 PROCEDURE — 1159F MED LIST DOCD IN RCRD: CPT | Mod: CPTII,S$GLB,, | Performed by: INTERNAL MEDICINE

## 2021-08-18 PROCEDURE — 3008F BODY MASS INDEX DOCD: CPT | Mod: CPTII,S$GLB,, | Performed by: INTERNAL MEDICINE

## 2021-08-18 PROCEDURE — 1126F PR PAIN SEVERITY QUANTIFIED, NO PAIN PRESENT: ICD-10-PCS | Mod: CPTII,S$GLB,, | Performed by: INTERNAL MEDICINE

## 2021-08-18 PROCEDURE — 3008F PR BODY MASS INDEX (BMI) DOCUMENTED: ICD-10-PCS | Mod: CPTII,S$GLB,, | Performed by: INTERNAL MEDICINE

## 2021-08-18 PROCEDURE — 1159F PR MEDICATION LIST DOCUMENTED IN MEDICAL RECORD: ICD-10-PCS | Mod: CPTII,S$GLB,, | Performed by: INTERNAL MEDICINE

## 2021-08-18 PROCEDURE — 3077F SYST BP >= 140 MM HG: CPT | Mod: CPTII,S$GLB,, | Performed by: INTERNAL MEDICINE

## 2021-08-20 ENCOUNTER — CLINICAL SUPPORT (OUTPATIENT)
Dept: URGENT CARE | Facility: CLINIC | Age: 66
End: 2021-08-20
Payer: COMMERCIAL

## 2021-08-20 ENCOUNTER — TELEPHONE (OUTPATIENT)
Dept: SURGERY | Facility: CLINIC | Age: 66
End: 2021-08-20

## 2021-08-20 DIAGNOSIS — Z11.52 ENCOUNTER FOR SCREENING FOR COVID-19: Primary | ICD-10-CM

## 2021-08-20 LAB
CTP QC/QA: YES
SARS-COV-2 RDRP RESP QL NAA+PROBE: NEGATIVE

## 2021-08-20 PROCEDURE — U0002: ICD-10-PCS | Mod: QW,S$GLB,, | Performed by: FAMILY MEDICINE

## 2021-08-20 PROCEDURE — U0002 COVID-19 LAB TEST NON-CDC: HCPCS | Mod: QW,S$GLB,, | Performed by: FAMILY MEDICINE

## 2021-08-23 ENCOUNTER — TELEPHONE (OUTPATIENT)
Dept: SURGERY | Facility: CLINIC | Age: 66
End: 2021-08-23

## 2021-08-24 ENCOUNTER — TELEPHONE (OUTPATIENT)
Dept: SURGERY | Facility: CLINIC | Age: 66
End: 2021-08-24

## 2021-08-26 ENCOUNTER — TELEPHONE (OUTPATIENT)
Dept: SURGERY | Facility: CLINIC | Age: 66
End: 2021-08-26

## 2021-08-27 ENCOUNTER — ANESTHESIA EVENT (OUTPATIENT)
Dept: SURGERY | Facility: HOSPITAL | Age: 66
End: 2021-08-27
Payer: COMMERCIAL

## 2021-08-27 ENCOUNTER — HOSPITAL ENCOUNTER (OUTPATIENT)
Dept: PREADMISSION TESTING | Facility: OTHER | Age: 66
Discharge: HOME | End: 2021-08-27
Attending: SURGERY
Payer: COMMERCIAL

## 2021-08-27 ENCOUNTER — TELEPHONE (OUTPATIENT)
Dept: SURGERY | Facility: CLINIC | Age: 66
End: 2021-08-27

## 2021-08-27 DIAGNOSIS — Z01.818 PREOP TESTING: ICD-10-CM

## 2021-08-27 LAB
SARS-COV-2 RNA RESP QL NAA+PROBE: NOT DETECTED
SARS-COV-2- CYCLE NUMBER: NORMAL

## 2021-08-27 PROCEDURE — U0003 INFECTIOUS AGENT DETECTION BY NUCLEIC ACID (DNA OR RNA); SEVERE ACUTE RESPIRATORY SYNDROME CORONAVIRUS 2 (SARS-COV-2) (CORONAVIRUS DISEASE [COVID-19]), AMPLIFIED PROBE TECHNIQUE, MAKING USE OF HIGH THROUGHPUT TECHNOLOGIES AS DESCRIBED BY CMS-2020-01-R: HCPCS | Performed by: SURGERY

## 2021-08-27 PROCEDURE — U0005 INFEC AGEN DETEC AMPLI PROBE: HCPCS | Performed by: SURGERY

## 2021-08-30 ENCOUNTER — ANESTHESIA (OUTPATIENT)
Dept: SURGERY | Facility: HOSPITAL | Age: 66
End: 2021-08-30
Payer: COMMERCIAL

## 2021-09-07 ENCOUNTER — TELEPHONE (OUTPATIENT)
Dept: SURGERY | Facility: CLINIC | Age: 66
End: 2021-09-07

## 2021-09-09 ENCOUNTER — HOSPITAL ENCOUNTER (OUTPATIENT)
Dept: PREADMISSION TESTING | Facility: HOSPITAL | Age: 66
Discharge: HOME | End: 2021-09-09
Attending: SURGERY
Payer: COMMERCIAL

## 2021-09-09 VITALS
HEART RATE: 71 BPM | BODY MASS INDEX: 16.37 KG/M2 | SYSTOLIC BLOOD PRESSURE: 148 MMHG | TEMPERATURE: 98 F | DIASTOLIC BLOOD PRESSURE: 81 MMHG | WEIGHT: 92.38 LBS | HEIGHT: 63 IN | OXYGEN SATURATION: 100 % | RESPIRATION RATE: 18 BRPM

## 2021-09-09 DIAGNOSIS — Z01.818 PREOPERATIVE TESTING: Primary | ICD-10-CM

## 2021-09-09 LAB
ANION GAP SERPL CALC-SCNC: 6 MMOL/L (ref 8–16)
BASOPHILS # BLD AUTO: 0.02 K/UL (ref 0–0.2)
BASOPHILS NFR BLD: 0.3 % (ref 0–1.9)
BUN SERPL-MCNC: 15 MG/DL (ref 8–23)
CALCIUM SERPL-MCNC: 9.9 MG/DL (ref 8.7–10.5)
CHLORIDE SERPL-SCNC: 103 MMOL/L (ref 95–110)
CO2 SERPL-SCNC: 30 MMOL/L (ref 23–29)
CREAT SERPL-MCNC: 0.7 MG/DL (ref 0.5–1.4)
DIFFERENTIAL METHOD: ABNORMAL
EOSINOPHIL # BLD AUTO: 0 K/UL (ref 0–0.5)
EOSINOPHIL NFR BLD: 0.2 % (ref 0–8)
ERYTHROCYTE [DISTWIDTH] IN BLOOD BY AUTOMATED COUNT: 13.5 % (ref 11.5–14.5)
EST. GFR  (AFRICAN AMERICAN): >60 ML/MIN/1.73 M^2
EST. GFR  (NON AFRICAN AMERICAN): >60 ML/MIN/1.73 M^2
GLUCOSE SERPL-MCNC: 92 MG/DL (ref 70–110)
HCT VFR BLD AUTO: 42.9 % (ref 37–48.5)
HGB BLD-MCNC: 13.2 G/DL (ref 12–16)
IMM GRANULOCYTES # BLD AUTO: 0.01 K/UL (ref 0–0.04)
IMM GRANULOCYTES NFR BLD AUTO: 0.2 % (ref 0–0.5)
LYMPHOCYTES # BLD AUTO: 2.7 K/UL (ref 1–4.8)
LYMPHOCYTES NFR BLD: 42.4 % (ref 18–48)
MCH RBC QN AUTO: 26.4 PG (ref 27–31)
MCHC RBC AUTO-ENTMCNC: 30.8 G/DL (ref 32–36)
MCV RBC AUTO: 86 FL (ref 82–98)
MONOCYTES # BLD AUTO: 0.6 K/UL (ref 0.3–1)
MONOCYTES NFR BLD: 9.8 % (ref 4–15)
NEUTROPHILS # BLD AUTO: 3 K/UL (ref 1.8–7.7)
NEUTROPHILS NFR BLD: 47.1 % (ref 38–73)
NRBC BLD-RTO: 0 /100 WBC
PLATELET # BLD AUTO: 217 K/UL (ref 150–450)
PMV BLD AUTO: 9.8 FL (ref 9.2–12.9)
POTASSIUM SERPL-SCNC: 4.4 MMOL/L (ref 3.5–5.1)
RBC # BLD AUTO: 5 M/UL (ref 4–5.4)
SODIUM SERPL-SCNC: 139 MMOL/L (ref 136–145)
WBC # BLD AUTO: 6.42 K/UL (ref 3.9–12.7)

## 2021-09-09 PROCEDURE — 36415 COLL VENOUS BLD VENIPUNCTURE: CPT | Performed by: SURGERY

## 2021-09-09 PROCEDURE — 80048 BASIC METABOLIC PNL TOTAL CA: CPT | Performed by: SURGERY

## 2021-09-09 PROCEDURE — 85025 COMPLETE CBC W/AUTO DIFF WBC: CPT | Performed by: SURGERY

## 2021-09-10 ENCOUNTER — HOSPITAL ENCOUNTER (OUTPATIENT)
Dept: PREADMISSION TESTING | Facility: HOSPITAL | Age: 66
Discharge: HOME | End: 2021-09-10
Attending: SURGERY
Payer: COMMERCIAL

## 2021-09-10 DIAGNOSIS — Z01.812 ENCOUNTER FOR PREOPERATIVE SCREENING LABORATORY TESTING FOR COVID-19 VIRUS: ICD-10-CM

## 2021-09-10 DIAGNOSIS — Z11.52 ENCOUNTER FOR PREOPERATIVE SCREENING LABORATORY TESTING FOR COVID-19 VIRUS: ICD-10-CM

## 2021-09-10 LAB — SARS-COV-2 RDRP RESP QL NAA+PROBE: NEGATIVE

## 2021-09-10 PROCEDURE — U0002 COVID-19 LAB TEST NON-CDC: HCPCS | Performed by: SURGERY

## 2021-09-12 RX ORDER — HYDROCODONE BITARTRATE AND ACETAMINOPHEN 5; 325 MG/1; MG/1
1 TABLET ORAL EVERY 6 HOURS PRN
Qty: 10 TABLET | Refills: 0 | Status: SHIPPED | OUTPATIENT
Start: 2021-09-12 | End: 2021-09-13 | Stop reason: SDUPTHER

## 2021-09-13 ENCOUNTER — HOSPITAL ENCOUNTER (OUTPATIENT)
Facility: HOSPITAL | Age: 66
Discharge: HOME | End: 2021-09-13
Attending: SURGERY | Admitting: SURGERY
Payer: COMMERCIAL

## 2021-09-13 ENCOUNTER — TELEPHONE (OUTPATIENT)
Dept: SURGERY | Facility: CLINIC | Age: 66
End: 2021-09-13

## 2021-09-13 ENCOUNTER — HOSPITAL ENCOUNTER (OUTPATIENT)
Dept: RADIOLOGY | Facility: HOSPITAL | Age: 66
Discharge: HOME | End: 2021-09-13
Attending: SURGERY | Admitting: SURGERY
Payer: COMMERCIAL

## 2021-09-13 VITALS
TEMPERATURE: 98 F | DIASTOLIC BLOOD PRESSURE: 75 MMHG | HEART RATE: 82 BPM | SYSTOLIC BLOOD PRESSURE: 139 MMHG | RESPIRATION RATE: 17 BRPM | OXYGEN SATURATION: 96 % | BODY MASS INDEX: 16.36 KG/M2 | WEIGHT: 92.38 LBS

## 2021-09-13 DIAGNOSIS — D05.11 DUCTAL CARCINOMA IN SITU (DCIS) OF RIGHT BREAST: ICD-10-CM

## 2021-09-13 DIAGNOSIS — Z11.52 ENCOUNTER FOR PREOPERATIVE SCREENING LABORATORY TESTING FOR COVID-19 VIRUS: ICD-10-CM

## 2021-09-13 DIAGNOSIS — D05.11 DUCTAL CARCINOMA IN SITU (DCIS) OF RIGHT BREAST: Primary | ICD-10-CM

## 2021-09-13 DIAGNOSIS — Z01.812 ENCOUNTER FOR PREOPERATIVE SCREENING LABORATORY TESTING FOR COVID-19 VIRUS: ICD-10-CM

## 2021-09-13 PROCEDURE — D9220A PRA ANESTHESIA: ICD-10-PCS | Mod: CRNA,,, | Performed by: STUDENT IN AN ORGANIZED HEALTH CARE EDUCATION/TRAINING PROGRAM

## 2021-09-13 PROCEDURE — 19303 MAST SIMPLE COMPLETE: CPT | Mod: RT,,, | Performed by: SURGERY

## 2021-09-13 PROCEDURE — 71000015 HC POSTOP RECOV 1ST HR: Performed by: SURGERY

## 2021-09-13 PROCEDURE — 88331 PR  PATH CONSULT IN SURG,W FRZ SEC: ICD-10-PCS | Mod: 26,,, | Performed by: PATHOLOGY

## 2021-09-13 PROCEDURE — 63600175 PHARM REV CODE 636 W HCPCS: Performed by: SURGERY

## 2021-09-13 PROCEDURE — 71000016 HC POSTOP RECOV ADDL HR: Performed by: SURGERY

## 2021-09-13 PROCEDURE — 88341 IMHCHEM/IMCYTCHM EA ADD ANTB: CPT | Mod: 59 | Performed by: PATHOLOGY

## 2021-09-13 PROCEDURE — 88307 TISSUE EXAM BY PATHOLOGIST: CPT | Mod: 26,,, | Performed by: PATHOLOGY

## 2021-09-13 PROCEDURE — 88307 PR  SURG PATH,LEVEL V: ICD-10-PCS | Mod: 26,,, | Performed by: PATHOLOGY

## 2021-09-13 PROCEDURE — D9220A PRA ANESTHESIA: Mod: ANES,,, | Performed by: ANESTHESIOLOGY

## 2021-09-13 PROCEDURE — D9220A PRA ANESTHESIA: Mod: CRNA,,, | Performed by: STUDENT IN AN ORGANIZED HEALTH CARE EDUCATION/TRAINING PROGRAM

## 2021-09-13 PROCEDURE — 63600175 PHARM REV CODE 636 W HCPCS: Performed by: STUDENT IN AN ORGANIZED HEALTH CARE EDUCATION/TRAINING PROGRAM

## 2021-09-13 PROCEDURE — 88307 TISSUE EXAM BY PATHOLOGIST: CPT | Mod: 59 | Performed by: PATHOLOGY

## 2021-09-13 PROCEDURE — 88342 CHG IMMUNOCYTOCHEMISTRY: ICD-10-PCS | Mod: 26,,, | Performed by: PATHOLOGY

## 2021-09-13 PROCEDURE — 37000008 HC ANESTHESIA 1ST 15 MINUTES: Performed by: SURGERY

## 2021-09-13 PROCEDURE — 38792 RA TRACER ID OF SENTINL NODE: CPT | Mod: RT,,, | Performed by: SURGERY

## 2021-09-13 PROCEDURE — 88342 IMHCHEM/IMCYTCHM 1ST ANTB: CPT | Mod: 26,,, | Performed by: PATHOLOGY

## 2021-09-13 PROCEDURE — 25000003 PHARM REV CODE 250: Performed by: SURGERY

## 2021-09-13 PROCEDURE — 38792 PR IDENTIFY SENTINEL 2DE: ICD-10-PCS | Mod: RT,,, | Performed by: SURGERY

## 2021-09-13 PROCEDURE — 19303 PR MASTECTOMY, SIMPLE, COMPLETE: ICD-10-PCS | Mod: RT,,, | Performed by: SURGERY

## 2021-09-13 PROCEDURE — 25000003 PHARM REV CODE 250

## 2021-09-13 PROCEDURE — 25000003 PHARM REV CODE 250: Performed by: STUDENT IN AN ORGANIZED HEALTH CARE EDUCATION/TRAINING PROGRAM

## 2021-09-13 PROCEDURE — 76098 X-RAY EXAM SURGICAL SPECIMEN: CPT | Mod: TC

## 2021-09-13 PROCEDURE — 38525 BIOPSY/REMOVAL LYMPH NODES: CPT | Mod: 51,RT,, | Performed by: SURGERY

## 2021-09-13 PROCEDURE — C9290 INJ, BUPIVACAINE LIPOSOME: HCPCS | Performed by: SURGERY

## 2021-09-13 PROCEDURE — 37000009 HC ANESTHESIA EA ADD 15 MINS: Performed by: SURGERY

## 2021-09-13 PROCEDURE — 38525 PR BIOPSY/REM LYMPH NODES, AXILLARY: ICD-10-PCS | Mod: 51,RT,, | Performed by: SURGERY

## 2021-09-13 PROCEDURE — 36000707: Performed by: SURGERY

## 2021-09-13 PROCEDURE — 88342 IMHCHEM/IMCYTCHM 1ST ANTB: CPT | Performed by: PATHOLOGY

## 2021-09-13 PROCEDURE — 88331 PATH CONSLTJ SURG 1 BLK 1SPC: CPT | Mod: 26,,, | Performed by: PATHOLOGY

## 2021-09-13 PROCEDURE — A9520 TC99 TILMANOCEPT DIAG 0.5MCI: HCPCS

## 2021-09-13 PROCEDURE — 88341 PR IHC OR ICC EACH ADD'L SINGLE ANTIBODY  STAINPR: ICD-10-PCS | Mod: 26,,, | Performed by: PATHOLOGY

## 2021-09-13 PROCEDURE — 88341 IMHCHEM/IMCYTCHM EA ADD ANTB: CPT | Mod: 26,,, | Performed by: PATHOLOGY

## 2021-09-13 PROCEDURE — 88331 PATH CONSLTJ SURG 1 BLK 1SPC: CPT | Mod: 59 | Performed by: PATHOLOGY

## 2021-09-13 PROCEDURE — 71000033 HC RECOVERY, INTIAL HOUR: Performed by: SURGERY

## 2021-09-13 PROCEDURE — 36000706: Performed by: SURGERY

## 2021-09-13 PROCEDURE — D9220A PRA ANESTHESIA: ICD-10-PCS | Mod: ANES,,, | Performed by: ANESTHESIOLOGY

## 2021-09-13 RX ORDER — SODIUM CHLORIDE, SODIUM LACTATE, POTASSIUM CHLORIDE, CALCIUM CHLORIDE 600; 310; 30; 20 MG/100ML; MG/100ML; MG/100ML; MG/100ML
INJECTION, SOLUTION INTRAVENOUS CONTINUOUS PRN
Status: DISCONTINUED | OUTPATIENT
Start: 2021-09-13 | End: 2021-09-13

## 2021-09-13 RX ORDER — BUPIVACAINE HYDROCHLORIDE 2.5 MG/ML
INJECTION, SOLUTION EPIDURAL; INFILTRATION; INTRACAUDAL
Status: DISCONTINUED | OUTPATIENT
Start: 2021-09-13 | End: 2021-09-13 | Stop reason: HOSPADM

## 2021-09-13 RX ORDER — LIDOCAINE HYDROCHLORIDE 20 MG/ML
INJECTION INTRAVENOUS
Status: DISCONTINUED | OUTPATIENT
Start: 2021-09-13 | End: 2021-09-13

## 2021-09-13 RX ORDER — CELECOXIB 100 MG/1
400 CAPSULE ORAL ONCE
Status: COMPLETED | OUTPATIENT
Start: 2021-09-13 | End: 2021-09-13

## 2021-09-13 RX ORDER — SODIUM CHLORIDE 0.9 % (FLUSH) 0.9 %
10 SYRINGE (ML) INJECTION
Status: DISCONTINUED | OUTPATIENT
Start: 2021-09-13 | End: 2021-09-13 | Stop reason: HOSPADM

## 2021-09-13 RX ORDER — NEOSTIGMINE METHYLSULFATE 1 MG/ML
INJECTION, SOLUTION INTRAVENOUS
Status: DISCONTINUED | OUTPATIENT
Start: 2021-09-13 | End: 2021-09-13

## 2021-09-13 RX ORDER — CLINDAMYCIN PHOSPHATE 900 MG/50ML
900 INJECTION, SOLUTION INTRAVENOUS
Status: COMPLETED | OUTPATIENT
Start: 2021-09-13 | End: 2021-09-13

## 2021-09-13 RX ORDER — HYDROCODONE BITARTRATE AND ACETAMINOPHEN 5; 325 MG/1; MG/1
1 TABLET ORAL EVERY 6 HOURS PRN
Qty: 20 TABLET | Refills: 0 | Status: SHIPPED | OUTPATIENT
Start: 2021-09-13 | End: 2021-11-30

## 2021-09-13 RX ORDER — ONDANSETRON 2 MG/ML
INJECTION INTRAMUSCULAR; INTRAVENOUS
Status: DISCONTINUED | OUTPATIENT
Start: 2021-09-13 | End: 2021-09-13

## 2021-09-13 RX ORDER — PHENYLEPHRINE HYDROCHLORIDE 10 MG/ML
INJECTION INTRAVENOUS
Status: DISCONTINUED | OUTPATIENT
Start: 2021-09-13 | End: 2021-09-13

## 2021-09-13 RX ORDER — ACETAMINOPHEN 10 MG/ML
15 INJECTION, SOLUTION INTRAVENOUS ONCE
Status: DISCONTINUED | OUTPATIENT
Start: 2021-09-13 | End: 2021-09-13 | Stop reason: HOSPADM

## 2021-09-13 RX ORDER — FENTANYL CITRATE 50 UG/ML
100 INJECTION, SOLUTION INTRAMUSCULAR; INTRAVENOUS EVERY 5 MIN PRN
Status: DISCONTINUED | OUTPATIENT
Start: 2021-09-13 | End: 2021-09-13 | Stop reason: HOSPADM

## 2021-09-13 RX ORDER — ACETAMINOPHEN 500 MG
1000 TABLET ORAL
Status: COMPLETED | OUTPATIENT
Start: 2021-09-13 | End: 2021-09-13

## 2021-09-13 RX ORDER — FENTANYL CITRATE 50 UG/ML
INJECTION, SOLUTION INTRAMUSCULAR; INTRAVENOUS
Status: DISCONTINUED | OUTPATIENT
Start: 2021-09-13 | End: 2021-09-13

## 2021-09-13 RX ORDER — DEXAMETHASONE SODIUM PHOSPHATE 4 MG/ML
INJECTION, SOLUTION INTRA-ARTICULAR; INTRALESIONAL; INTRAMUSCULAR; INTRAVENOUS; SOFT TISSUE
Status: DISCONTINUED | OUTPATIENT
Start: 2021-09-13 | End: 2021-09-13

## 2021-09-13 RX ORDER — MIDAZOLAM HYDROCHLORIDE 1 MG/ML
INJECTION INTRAMUSCULAR; INTRAVENOUS
Status: DISCONTINUED | OUTPATIENT
Start: 2021-09-13 | End: 2021-09-13

## 2021-09-13 RX ORDER — PROPOFOL 10 MG/ML
VIAL (ML) INTRAVENOUS
Status: DISCONTINUED | OUTPATIENT
Start: 2021-09-13 | End: 2021-09-13

## 2021-09-13 RX ORDER — MIDAZOLAM HYDROCHLORIDE 1 MG/ML
2 INJECTION INTRAMUSCULAR; INTRAVENOUS
Status: DISCONTINUED | OUTPATIENT
Start: 2021-09-13 | End: 2021-09-13 | Stop reason: HOSPADM

## 2021-09-13 RX ORDER — ROCURONIUM BROMIDE 10 MG/ML
INJECTION, SOLUTION INTRAVENOUS
Status: DISCONTINUED | OUTPATIENT
Start: 2021-09-13 | End: 2021-09-13

## 2021-09-13 RX ORDER — SODIUM CHLORIDE 9 MG/ML
INJECTION, SOLUTION INTRAVENOUS CONTINUOUS
Status: DISCONTINUED | OUTPATIENT
Start: 2021-09-13 | End: 2021-09-13 | Stop reason: HOSPADM

## 2021-09-13 RX ORDER — HYDROMORPHONE HYDROCHLORIDE 2 MG/ML
0.2 INJECTION, SOLUTION INTRAMUSCULAR; INTRAVENOUS; SUBCUTANEOUS EVERY 5 MIN PRN
Status: DISCONTINUED | OUTPATIENT
Start: 2021-09-13 | End: 2021-09-13 | Stop reason: HOSPADM

## 2021-09-13 RX ADMIN — ACETAMINOPHEN 1000 MG: 500 TABLET, FILM COATED ORAL at 06:09

## 2021-09-13 RX ADMIN — CLINDAMYCIN PHOSPHATE 900 MG: 18 INJECTION, SOLUTION INTRAVENOUS at 07:09

## 2021-09-13 RX ADMIN — PHENYLEPHRINE HYDROCHLORIDE 100 MCG: 10 INJECTION INTRAVENOUS at 08:09

## 2021-09-13 RX ADMIN — PHENYLEPHRINE HYDROCHLORIDE 100 MCG: 10 INJECTION INTRAVENOUS at 09:09

## 2021-09-13 RX ADMIN — PROPOFOL 150 MG: 10 INJECTION, EMULSION INTRAVENOUS at 07:09

## 2021-09-13 RX ADMIN — PHENYLEPHRINE HYDROCHLORIDE 100 MCG: 10 INJECTION INTRAVENOUS at 07:09

## 2021-09-13 RX ADMIN — ONDANSETRON 4 MG: 2 INJECTION, SOLUTION INTRAMUSCULAR; INTRAVENOUS at 08:09

## 2021-09-13 RX ADMIN — SODIUM CHLORIDE, SODIUM LACTATE, POTASSIUM CHLORIDE, AND CALCIUM CHLORIDE: .6; .31; .03; .02 INJECTION, SOLUTION INTRAVENOUS at 07:09

## 2021-09-13 RX ADMIN — CELECOXIB 400 MG: 100 CAPSULE ORAL at 06:09

## 2021-09-13 RX ADMIN — MIDAZOLAM HYDROCHLORIDE 1 MG: 1 INJECTION, SOLUTION INTRAMUSCULAR; INTRAVENOUS at 07:09

## 2021-09-13 RX ADMIN — ROCURONIUM BROMIDE 30 MG: 10 INJECTION, SOLUTION INTRAVENOUS at 07:09

## 2021-09-13 RX ADMIN — FENTANYL CITRATE 100 MCG: 50 INJECTION, SOLUTION INTRAMUSCULAR; INTRAVENOUS at 07:09

## 2021-09-13 RX ADMIN — GLYCOPYRROLATE 0.3 MG: 0.2 INJECTION, SOLUTION INTRAMUSCULAR; INTRAVITREAL at 09:09

## 2021-09-13 RX ADMIN — NEOSTIGMINE METHYLSULFATE 3 MG: 1 INJECTION INTRAVENOUS at 09:09

## 2021-09-13 RX ADMIN — PROPOFOL 20 MG: 10 INJECTION, EMULSION INTRAVENOUS at 09:09

## 2021-09-13 RX ADMIN — BUPIVACAINE 133 MG: 13.3 INJECTION, SUSPENSION, LIPOSOMAL INFILTRATION at 08:09

## 2021-09-13 RX ADMIN — DEXAMETHASONE SODIUM PHOSPHATE 4 MG: 4 INJECTION, SOLUTION INTRAMUSCULAR; INTRAVENOUS at 07:09

## 2021-09-13 RX ADMIN — SODIUM CHLORIDE, SODIUM LACTATE, POTASSIUM CHLORIDE, AND CALCIUM CHLORIDE: .6; .31; .03; .02 INJECTION, SOLUTION INTRAVENOUS at 09:09

## 2021-09-13 RX ADMIN — LIDOCAINE HYDROCHLORIDE 100 MG: 20 INJECTION, SOLUTION INTRAVENOUS at 07:09

## 2021-09-15 LAB
FINAL PATHOLOGIC DIAGNOSIS: NORMAL
FROZEN SECTION DIAGNOSIS: NORMAL
FROZEN SECTION FOOTNOTE: NORMAL
GROSS: NORMAL
Lab: NORMAL
MICROSCOPIC EXAM: NORMAL

## 2021-10-04 ENCOUNTER — OFFICE VISIT (OUTPATIENT)
Dept: SURGERY | Facility: CLINIC | Age: 66
End: 2021-10-04
Payer: COMMERCIAL

## 2021-10-04 VITALS
SYSTOLIC BLOOD PRESSURE: 143 MMHG | BODY MASS INDEX: 15.94 KG/M2 | WEIGHT: 90 LBS | DIASTOLIC BLOOD PRESSURE: 88 MMHG | HEART RATE: 99 BPM | RESPIRATION RATE: 18 BRPM

## 2021-10-04 DIAGNOSIS — D05.11 DUCTAL CARCINOMA IN SITU (DCIS) OF RIGHT BREAST: ICD-10-CM

## 2021-10-04 DIAGNOSIS — Z98.890 POST-OPERATIVE STATE: ICD-10-CM

## 2021-10-04 DIAGNOSIS — Z12.31 BREAST CANCER SCREENING BY MAMMOGRAM: Primary | ICD-10-CM

## 2021-10-04 PROCEDURE — 1126F AMNT PAIN NOTED NONE PRSNT: CPT | Mod: CPTII,S$GLB,, | Performed by: SURGERY

## 2021-10-04 PROCEDURE — 4010F PR ACE/ARB THEARPY RXD/TAKEN: ICD-10-PCS | Mod: CPTII,S$GLB,, | Performed by: SURGERY

## 2021-10-04 PROCEDURE — 1159F MED LIST DOCD IN RCRD: CPT | Mod: CPTII,S$GLB,, | Performed by: SURGERY

## 2021-10-04 PROCEDURE — 1159F PR MEDICATION LIST DOCUMENTED IN MEDICAL RECORD: ICD-10-PCS | Mod: CPTII,S$GLB,, | Performed by: SURGERY

## 2021-10-04 PROCEDURE — 1160F RVW MEDS BY RX/DR IN RCRD: CPT | Mod: CPTII,S$GLB,, | Performed by: SURGERY

## 2021-10-04 PROCEDURE — 3079F PR MOST RECENT DIASTOLIC BLOOD PRESSURE 80-89 MM HG: ICD-10-PCS | Mod: CPTII,S$GLB,, | Performed by: SURGERY

## 2021-10-04 PROCEDURE — 99999 PR PBB SHADOW E&M-EST. PATIENT-LVL III: ICD-10-PCS | Mod: PBBFAC,,, | Performed by: SURGERY

## 2021-10-04 PROCEDURE — 3079F DIAST BP 80-89 MM HG: CPT | Mod: CPTII,S$GLB,, | Performed by: SURGERY

## 2021-10-04 PROCEDURE — 4010F ACE/ARB THERAPY RXD/TAKEN: CPT | Mod: CPTII,S$GLB,, | Performed by: SURGERY

## 2021-10-04 PROCEDURE — 99999 PR PBB SHADOW E&M-EST. PATIENT-LVL III: CPT | Mod: PBBFAC,,, | Performed by: SURGERY

## 2021-10-04 PROCEDURE — 99024 PR POST-OP FOLLOW-UP VISIT: ICD-10-PCS | Mod: S$GLB,,, | Performed by: SURGERY

## 2021-10-04 PROCEDURE — 3077F SYST BP >= 140 MM HG: CPT | Mod: CPTII,S$GLB,, | Performed by: SURGERY

## 2021-10-04 PROCEDURE — 3008F PR BODY MASS INDEX (BMI) DOCUMENTED: ICD-10-PCS | Mod: CPTII,S$GLB,, | Performed by: SURGERY

## 2021-10-04 PROCEDURE — 99024 POSTOP FOLLOW-UP VISIT: CPT | Mod: S$GLB,,, | Performed by: SURGERY

## 2021-10-04 PROCEDURE — 3077F PR MOST RECENT SYSTOLIC BLOOD PRESSURE >= 140 MM HG: ICD-10-PCS | Mod: CPTII,S$GLB,, | Performed by: SURGERY

## 2021-10-04 PROCEDURE — 1160F PR REVIEW ALL MEDS BY PRESCRIBER/CLIN PHARMACIST DOCUMENTED: ICD-10-PCS | Mod: CPTII,S$GLB,, | Performed by: SURGERY

## 2021-10-04 PROCEDURE — 3008F BODY MASS INDEX DOCD: CPT | Mod: CPTII,S$GLB,, | Performed by: SURGERY

## 2021-10-04 PROCEDURE — 1126F PR PAIN SEVERITY QUANTIFIED, NO PAIN PRESENT: ICD-10-PCS | Mod: CPTII,S$GLB,, | Performed by: SURGERY

## 2021-10-05 ENCOUNTER — TELEPHONE (OUTPATIENT)
Dept: HEMATOLOGY/ONCOLOGY | Facility: CLINIC | Age: 66
End: 2021-10-05

## 2021-10-11 ENCOUNTER — OFFICE VISIT (OUTPATIENT)
Dept: HEMATOLOGY/ONCOLOGY | Facility: CLINIC | Age: 66
End: 2021-10-11
Payer: COMMERCIAL

## 2021-10-11 VITALS
OXYGEN SATURATION: 99 % | SYSTOLIC BLOOD PRESSURE: 185 MMHG | WEIGHT: 92.13 LBS | TEMPERATURE: 98 F | BODY MASS INDEX: 16.32 KG/M2 | DIASTOLIC BLOOD PRESSURE: 85 MMHG | HEART RATE: 90 BPM | HEIGHT: 63 IN

## 2021-10-11 DIAGNOSIS — D05.10 DUCTAL CARCINOMA IN SITU (DCIS) OF BREAST, UNSPECIFIED LATERALITY: Primary | ICD-10-CM

## 2021-10-11 DIAGNOSIS — Z78.0 POSTMENOPAUSAL: ICD-10-CM

## 2021-10-11 PROCEDURE — 3079F PR MOST RECENT DIASTOLIC BLOOD PRESSURE 80-89 MM HG: ICD-10-PCS | Mod: CPTII,S$GLB,, | Performed by: INTERNAL MEDICINE

## 2021-10-11 PROCEDURE — 99999 PR PBB SHADOW E&M-EST. PATIENT-LVL V: CPT | Mod: PBBFAC,,, | Performed by: INTERNAL MEDICINE

## 2021-10-11 PROCEDURE — 99999 PR PBB SHADOW E&M-EST. PATIENT-LVL V: ICD-10-PCS | Mod: PBBFAC,,, | Performed by: INTERNAL MEDICINE

## 2021-10-11 PROCEDURE — 3079F DIAST BP 80-89 MM HG: CPT | Mod: CPTII,S$GLB,, | Performed by: INTERNAL MEDICINE

## 2021-10-11 PROCEDURE — 3288F FALL RISK ASSESSMENT DOCD: CPT | Mod: CPTII,S$GLB,, | Performed by: INTERNAL MEDICINE

## 2021-10-11 PROCEDURE — 3008F BODY MASS INDEX DOCD: CPT | Mod: CPTII,S$GLB,, | Performed by: INTERNAL MEDICINE

## 2021-10-11 PROCEDURE — 1101F PR PT FALLS ASSESS DOC 0-1 FALLS W/OUT INJ PAST YR: ICD-10-PCS | Mod: CPTII,S$GLB,, | Performed by: INTERNAL MEDICINE

## 2021-10-11 PROCEDURE — 1126F AMNT PAIN NOTED NONE PRSNT: CPT | Mod: CPTII,S$GLB,, | Performed by: INTERNAL MEDICINE

## 2021-10-11 PROCEDURE — 1159F MED LIST DOCD IN RCRD: CPT | Mod: CPTII,S$GLB,, | Performed by: INTERNAL MEDICINE

## 2021-10-11 PROCEDURE — 99205 OFFICE O/P NEW HI 60 MIN: CPT | Mod: S$GLB,,, | Performed by: INTERNAL MEDICINE

## 2021-10-11 PROCEDURE — 99205 PR OFFICE/OUTPT VISIT, NEW, LEVL V, 60-74 MIN: ICD-10-PCS | Mod: S$GLB,,, | Performed by: INTERNAL MEDICINE

## 2021-10-11 PROCEDURE — 4010F ACE/ARB THERAPY RXD/TAKEN: CPT | Mod: CPTII,S$GLB,, | Performed by: INTERNAL MEDICINE

## 2021-10-11 PROCEDURE — 1126F PR PAIN SEVERITY QUANTIFIED, NO PAIN PRESENT: ICD-10-PCS | Mod: CPTII,S$GLB,, | Performed by: INTERNAL MEDICINE

## 2021-10-11 PROCEDURE — 1159F PR MEDICATION LIST DOCUMENTED IN MEDICAL RECORD: ICD-10-PCS | Mod: CPTII,S$GLB,, | Performed by: INTERNAL MEDICINE

## 2021-10-11 PROCEDURE — 3288F PR FALLS RISK ASSESSMENT DOCUMENTED: ICD-10-PCS | Mod: CPTII,S$GLB,, | Performed by: INTERNAL MEDICINE

## 2021-10-11 PROCEDURE — 1101F PT FALLS ASSESS-DOCD LE1/YR: CPT | Mod: CPTII,S$GLB,, | Performed by: INTERNAL MEDICINE

## 2021-10-11 PROCEDURE — 4010F PR ACE/ARB THEARPY RXD/TAKEN: ICD-10-PCS | Mod: CPTII,S$GLB,, | Performed by: INTERNAL MEDICINE

## 2021-10-11 PROCEDURE — 3077F SYST BP >= 140 MM HG: CPT | Mod: CPTII,S$GLB,, | Performed by: INTERNAL MEDICINE

## 2021-10-11 PROCEDURE — 3008F PR BODY MASS INDEX (BMI) DOCUMENTED: ICD-10-PCS | Mod: CPTII,S$GLB,, | Performed by: INTERNAL MEDICINE

## 2021-10-11 PROCEDURE — 3077F PR MOST RECENT SYSTOLIC BLOOD PRESSURE >= 140 MM HG: ICD-10-PCS | Mod: CPTII,S$GLB,, | Performed by: INTERNAL MEDICINE

## 2021-10-11 RX ORDER — ANASTROZOLE 1 MG/1
1 TABLET ORAL DAILY
Qty: 30 TABLET | Refills: 6 | Status: SHIPPED | OUTPATIENT
Start: 2021-10-11 | End: 2022-04-18

## 2021-10-13 ENCOUNTER — TELEPHONE (OUTPATIENT)
Dept: HEMATOLOGY/ONCOLOGY | Facility: CLINIC | Age: 66
End: 2021-10-13

## 2021-10-18 ENCOUNTER — TELEPHONE (OUTPATIENT)
Dept: FAMILY MEDICINE | Facility: CLINIC | Age: 66
End: 2021-10-18

## 2021-10-19 ENCOUNTER — OFFICE VISIT (OUTPATIENT)
Dept: FAMILY MEDICINE | Facility: CLINIC | Age: 66
End: 2021-10-19
Payer: COMMERCIAL

## 2021-10-19 VITALS
HEIGHT: 63 IN | HEART RATE: 87 BPM | BODY MASS INDEX: 15.98 KG/M2 | TEMPERATURE: 98 F | DIASTOLIC BLOOD PRESSURE: 75 MMHG | OXYGEN SATURATION: 98 % | SYSTOLIC BLOOD PRESSURE: 113 MMHG | WEIGHT: 90.19 LBS

## 2021-10-19 DIAGNOSIS — R63.6 UNDERWEIGHT: ICD-10-CM

## 2021-10-19 DIAGNOSIS — D05.11 DUCTAL CARCINOMA IN SITU (DCIS) OF RIGHT BREAST: ICD-10-CM

## 2021-10-19 DIAGNOSIS — I10 ESSENTIAL HYPERTENSION: Primary | ICD-10-CM

## 2021-10-19 DIAGNOSIS — Z23 NEEDS FLU SHOT: ICD-10-CM

## 2021-10-19 PROCEDURE — 1159F PR MEDICATION LIST DOCUMENTED IN MEDICAL RECORD: ICD-10-PCS | Mod: CPTII,S$GLB,, | Performed by: NURSE PRACTITIONER

## 2021-10-19 PROCEDURE — 1159F MED LIST DOCD IN RCRD: CPT | Mod: CPTII,S$GLB,, | Performed by: NURSE PRACTITIONER

## 2021-10-19 PROCEDURE — 4010F PR ACE/ARB THEARPY RXD/TAKEN: ICD-10-PCS | Mod: CPTII,S$GLB,, | Performed by: NURSE PRACTITIONER

## 2021-10-19 PROCEDURE — 90471 IMMUNIZATION ADMIN: CPT | Mod: S$GLB,,, | Performed by: NURSE PRACTITIONER

## 2021-10-19 PROCEDURE — 90694 VACC AIIV4 NO PRSRV 0.5ML IM: CPT | Mod: S$GLB,,, | Performed by: NURSE PRACTITIONER

## 2021-10-19 PROCEDURE — 99214 OFFICE O/P EST MOD 30 MIN: CPT | Mod: 25,S$GLB,, | Performed by: NURSE PRACTITIONER

## 2021-10-19 PROCEDURE — 3078F PR MOST RECENT DIASTOLIC BLOOD PRESSURE < 80 MM HG: ICD-10-PCS | Mod: CPTII,S$GLB,, | Performed by: NURSE PRACTITIONER

## 2021-10-19 PROCEDURE — 90694 FLU VACCINE - QUADRIVALENT - ADJUVANTED: ICD-10-PCS | Mod: S$GLB,,, | Performed by: NURSE PRACTITIONER

## 2021-10-19 PROCEDURE — 3074F PR MOST RECENT SYSTOLIC BLOOD PRESSURE < 130 MM HG: ICD-10-PCS | Mod: CPTII,S$GLB,, | Performed by: NURSE PRACTITIONER

## 2021-10-19 PROCEDURE — 1126F PR PAIN SEVERITY QUANTIFIED, NO PAIN PRESENT: ICD-10-PCS | Mod: CPTII,S$GLB,, | Performed by: NURSE PRACTITIONER

## 2021-10-19 PROCEDURE — 1126F AMNT PAIN NOTED NONE PRSNT: CPT | Mod: CPTII,S$GLB,, | Performed by: NURSE PRACTITIONER

## 2021-10-19 PROCEDURE — 3288F PR FALLS RISK ASSESSMENT DOCUMENTED: ICD-10-PCS | Mod: CPTII,S$GLB,, | Performed by: NURSE PRACTITIONER

## 2021-10-19 PROCEDURE — 1160F PR REVIEW ALL MEDS BY PRESCRIBER/CLIN PHARMACIST DOCUMENTED: ICD-10-PCS | Mod: CPTII,S$GLB,, | Performed by: NURSE PRACTITIONER

## 2021-10-19 PROCEDURE — 1101F PT FALLS ASSESS-DOCD LE1/YR: CPT | Mod: CPTII,S$GLB,, | Performed by: NURSE PRACTITIONER

## 2021-10-19 PROCEDURE — 3074F SYST BP LT 130 MM HG: CPT | Mod: CPTII,S$GLB,, | Performed by: NURSE PRACTITIONER

## 2021-10-19 PROCEDURE — 3288F FALL RISK ASSESSMENT DOCD: CPT | Mod: CPTII,S$GLB,, | Performed by: NURSE PRACTITIONER

## 2021-10-19 PROCEDURE — 3008F PR BODY MASS INDEX (BMI) DOCUMENTED: ICD-10-PCS | Mod: CPTII,S$GLB,, | Performed by: NURSE PRACTITIONER

## 2021-10-19 PROCEDURE — 3078F DIAST BP <80 MM HG: CPT | Mod: CPTII,S$GLB,, | Performed by: NURSE PRACTITIONER

## 2021-10-19 PROCEDURE — 99999 PR PBB SHADOW E&M-EST. PATIENT-LVL V: ICD-10-PCS | Mod: PBBFAC,,, | Performed by: NURSE PRACTITIONER

## 2021-10-19 PROCEDURE — 90471 FLU VACCINE - QUADRIVALENT - ADJUVANTED: ICD-10-PCS | Mod: S$GLB,,, | Performed by: NURSE PRACTITIONER

## 2021-10-19 PROCEDURE — 99999 PR PBB SHADOW E&M-EST. PATIENT-LVL V: CPT | Mod: PBBFAC,,, | Performed by: NURSE PRACTITIONER

## 2021-10-19 PROCEDURE — 1101F PR PT FALLS ASSESS DOC 0-1 FALLS W/OUT INJ PAST YR: ICD-10-PCS | Mod: CPTII,S$GLB,, | Performed by: NURSE PRACTITIONER

## 2021-10-19 PROCEDURE — 99214 PR OFFICE/OUTPT VISIT, EST, LEVL IV, 30-39 MIN: ICD-10-PCS | Mod: 25,S$GLB,, | Performed by: NURSE PRACTITIONER

## 2021-10-19 PROCEDURE — 1160F RVW MEDS BY RX/DR IN RCRD: CPT | Mod: CPTII,S$GLB,, | Performed by: NURSE PRACTITIONER

## 2021-10-19 PROCEDURE — 3008F BODY MASS INDEX DOCD: CPT | Mod: CPTII,S$GLB,, | Performed by: NURSE PRACTITIONER

## 2021-10-19 PROCEDURE — 4010F ACE/ARB THERAPY RXD/TAKEN: CPT | Mod: CPTII,S$GLB,, | Performed by: NURSE PRACTITIONER

## 2021-11-05 ENCOUNTER — TELEPHONE (OUTPATIENT)
Dept: NEUROSURGERY | Facility: CLINIC | Age: 66
End: 2021-11-05
Payer: COMMERCIAL

## 2021-11-05 DIAGNOSIS — D33.3 ACOUSTIC NEUROMA: Primary | ICD-10-CM

## 2021-11-08 ENCOUNTER — TELEPHONE (OUTPATIENT)
Dept: NEUROSURGERY | Facility: CLINIC | Age: 66
End: 2021-11-08
Payer: COMMERCIAL

## 2021-11-17 ENCOUNTER — TELEPHONE (OUTPATIENT)
Dept: NEUROSURGERY | Facility: CLINIC | Age: 66
End: 2021-11-17
Payer: COMMERCIAL

## 2021-11-19 DIAGNOSIS — M81.0 OSTEOPOROSIS, POSTMENOPAUSAL: ICD-10-CM

## 2021-11-19 RX ORDER — ALENDRONATE SODIUM 70 MG/1
70 TABLET ORAL
Qty: 12 TABLET | Refills: 1 | Status: SHIPPED | OUTPATIENT
Start: 2021-11-19 | End: 2022-02-08 | Stop reason: SDUPTHER

## 2021-11-29 ENCOUNTER — PATIENT OUTREACH (OUTPATIENT)
Dept: ADMINISTRATIVE | Facility: OTHER | Age: 66
End: 2021-11-29
Payer: COMMERCIAL

## 2021-11-30 ENCOUNTER — OFFICE VISIT (OUTPATIENT)
Dept: NEUROSURGERY | Facility: CLINIC | Age: 66
End: 2021-11-30
Payer: COMMERCIAL

## 2021-11-30 VITALS
HEART RATE: 74 BPM | SYSTOLIC BLOOD PRESSURE: 183 MMHG | DIASTOLIC BLOOD PRESSURE: 85 MMHG | WEIGHT: 93.06 LBS | BODY MASS INDEX: 16.48 KG/M2

## 2021-11-30 DIAGNOSIS — D33.3 ACOUSTIC NEUROMA: Primary | ICD-10-CM

## 2021-11-30 PROCEDURE — 4010F PR ACE/ARB THEARPY RXD/TAKEN: ICD-10-PCS | Mod: CPTII,S$GLB,, | Performed by: NEUROLOGICAL SURGERY

## 2021-11-30 PROCEDURE — 4010F ACE/ARB THERAPY RXD/TAKEN: CPT | Mod: CPTII,S$GLB,, | Performed by: NEUROLOGICAL SURGERY

## 2021-11-30 PROCEDURE — 99999 PR PBB SHADOW E&M-EST. PATIENT-LVL III: CPT | Mod: PBBFAC,,, | Performed by: NEUROLOGICAL SURGERY

## 2021-11-30 PROCEDURE — 99214 OFFICE O/P EST MOD 30 MIN: CPT | Mod: S$GLB,,, | Performed by: NEUROLOGICAL SURGERY

## 2021-11-30 PROCEDURE — 99214 PR OFFICE/OUTPT VISIT, EST, LEVL IV, 30-39 MIN: ICD-10-PCS | Mod: S$GLB,,, | Performed by: NEUROLOGICAL SURGERY

## 2021-11-30 PROCEDURE — 99999 PR PBB SHADOW E&M-EST. PATIENT-LVL III: ICD-10-PCS | Mod: PBBFAC,,, | Performed by: NEUROLOGICAL SURGERY

## 2022-02-08 DIAGNOSIS — M81.0 OSTEOPOROSIS, POSTMENOPAUSAL: ICD-10-CM

## 2022-02-08 RX ORDER — ALENDRONATE SODIUM 70 MG/1
70 TABLET ORAL
Qty: 12 TABLET | Refills: 0 | Status: SHIPPED | OUTPATIENT
Start: 2022-02-08 | End: 2022-05-02

## 2022-02-08 NOTE — TELEPHONE ENCOUNTER
LOV 10- with LUPILLO Carrizales. Call was placed to pt to notify of med refill request received, MARY.

## 2022-02-08 NOTE — TELEPHONE ENCOUNTER
----- Message from Carlyn Zavaleta sent at 2/8/2022 12:35 PM CST -----  Contact: GAYLE VIDES [[id number]]  Type:  RX Refill Request    Who Called: GAYLE VIDES [[id number]]    Refill or New Rx: Refill     RX Name and Strength: alendronate (FOSAMAX) 70 MG tablet    How is the patient currently taking it? (ex. 1XDay): Sig - Route: Take 1 tablet (70 mg total) by mouth every 7 days. - Oral    Is this a 30 day or 90 day RX:    Preferred Pharmacy with phone number:   [id number] - [place], [place] - [address] [address] [facility]  [address] [address]  Phone: [phone] Fax: [phone]      Local or Mail Order:    Ordering Provider: Caro    Would the patient rather a call back or a response via [facility]?    Best Call Back Number: [phone] (mobile)    Additional Information:

## 2022-02-17 DIAGNOSIS — D05.10 DUCTAL CARCINOMA IN SITU (DCIS) OF BREAST, UNSPECIFIED LATERALITY: Primary | ICD-10-CM

## 2022-03-19 ENCOUNTER — LAB VISIT (OUTPATIENT)
Dept: LAB | Facility: HOSPITAL | Age: 67
End: 2022-03-19
Attending: INTERNAL MEDICINE
Payer: COMMERCIAL

## 2022-03-19 DIAGNOSIS — D05.10 DUCTAL CARCINOMA IN SITU (DCIS) OF BREAST, UNSPECIFIED LATERALITY: ICD-10-CM

## 2022-03-19 LAB
ALBUMIN SERPL BCP-MCNC: 4.2 G/DL (ref 3.5–5.2)
ALP SERPL-CCNC: 48 U/L (ref 55–135)
ALT SERPL W/O P-5'-P-CCNC: 35 U/L (ref 10–44)
ANION GAP SERPL CALC-SCNC: 10 MMOL/L (ref 8–16)
AST SERPL-CCNC: 35 U/L (ref 10–40)
BILIRUB SERPL-MCNC: 0.5 MG/DL (ref 0.1–1)
BUN SERPL-MCNC: 18 MG/DL (ref 8–23)
CALCIUM SERPL-MCNC: 9.5 MG/DL (ref 8.7–10.5)
CHLORIDE SERPL-SCNC: 102 MMOL/L (ref 95–110)
CO2 SERPL-SCNC: 30 MMOL/L (ref 23–29)
CREAT SERPL-MCNC: 0.7 MG/DL (ref 0.5–1.4)
ERYTHROCYTE [DISTWIDTH] IN BLOOD BY AUTOMATED COUNT: 13.3 % (ref 11.5–14.5)
EST. GFR  (AFRICAN AMERICAN): >60 ML/MIN/1.73 M^2
EST. GFR  (NON AFRICAN AMERICAN): >60 ML/MIN/1.73 M^2
GLUCOSE SERPL-MCNC: 93 MG/DL (ref 70–110)
HCT VFR BLD AUTO: 43.6 % (ref 37–48.5)
HGB BLD-MCNC: 12.8 G/DL (ref 12–16)
IMM GRANULOCYTES # BLD AUTO: 0.02 K/UL (ref 0–0.04)
MCH RBC QN AUTO: 26.2 PG (ref 27–31)
MCHC RBC AUTO-ENTMCNC: 29.4 G/DL (ref 32–36)
MCV RBC AUTO: 89 FL (ref 82–98)
NEUTROPHILS # BLD AUTO: 3 K/UL (ref 1.8–7.7)
PLATELET # BLD AUTO: 216 K/UL (ref 150–450)
PMV BLD AUTO: 10.3 FL (ref 9.2–12.9)
POTASSIUM SERPL-SCNC: 4.5 MMOL/L (ref 3.5–5.1)
PROT SERPL-MCNC: 7.6 G/DL (ref 6–8.4)
RBC # BLD AUTO: 4.89 M/UL (ref 4–5.4)
SODIUM SERPL-SCNC: 142 MMOL/L (ref 136–145)
WBC # BLD AUTO: 5.73 K/UL (ref 3.9–12.7)

## 2022-03-19 PROCEDURE — 80053 COMPREHEN METABOLIC PANEL: CPT | Performed by: INTERNAL MEDICINE

## 2022-03-19 PROCEDURE — 85027 COMPLETE CBC AUTOMATED: CPT | Performed by: INTERNAL MEDICINE

## 2022-03-19 PROCEDURE — 36415 COLL VENOUS BLD VENIPUNCTURE: CPT | Performed by: INTERNAL MEDICINE

## 2022-03-23 ENCOUNTER — OFFICE VISIT (OUTPATIENT)
Dept: HEMATOLOGY/ONCOLOGY | Facility: CLINIC | Age: 67
End: 2022-03-23
Payer: COMMERCIAL

## 2022-03-23 VITALS
DIASTOLIC BLOOD PRESSURE: 80 MMHG | HEART RATE: 87 BPM | HEIGHT: 63 IN | SYSTOLIC BLOOD PRESSURE: 140 MMHG | OXYGEN SATURATION: 98 % | BODY MASS INDEX: 16.13 KG/M2 | WEIGHT: 91.06 LBS | TEMPERATURE: 98 F

## 2022-03-23 DIAGNOSIS — I10 ESSENTIAL HYPERTENSION: ICD-10-CM

## 2022-03-23 DIAGNOSIS — D05.10 DUCTAL CARCINOMA IN SITU (DCIS) OF BREAST, UNSPECIFIED LATERALITY: Primary | ICD-10-CM

## 2022-03-23 DIAGNOSIS — Z78.0 POSTMENOPAUSAL: ICD-10-CM

## 2022-03-23 PROCEDURE — 4010F PR ACE/ARB THEARPY RXD/TAKEN: ICD-10-PCS | Mod: CPTII,S$GLB,, | Performed by: INTERNAL MEDICINE

## 2022-03-23 PROCEDURE — 99999 PR PBB SHADOW E&M-EST. PATIENT-LVL V: CPT | Mod: PBBFAC,,, | Performed by: INTERNAL MEDICINE

## 2022-03-23 PROCEDURE — 3008F PR BODY MASS INDEX (BMI) DOCUMENTED: ICD-10-PCS | Mod: CPTII,S$GLB,, | Performed by: INTERNAL MEDICINE

## 2022-03-23 PROCEDURE — 1126F PR PAIN SEVERITY QUANTIFIED, NO PAIN PRESENT: ICD-10-PCS | Mod: CPTII,S$GLB,, | Performed by: INTERNAL MEDICINE

## 2022-03-23 PROCEDURE — 99214 OFFICE O/P EST MOD 30 MIN: CPT | Mod: S$GLB,,, | Performed by: INTERNAL MEDICINE

## 2022-03-23 PROCEDURE — 99999 PR PBB SHADOW E&M-EST. PATIENT-LVL V: ICD-10-PCS | Mod: PBBFAC,,, | Performed by: INTERNAL MEDICINE

## 2022-03-23 PROCEDURE — 3288F FALL RISK ASSESSMENT DOCD: CPT | Mod: CPTII,S$GLB,, | Performed by: INTERNAL MEDICINE

## 2022-03-23 PROCEDURE — 1101F PT FALLS ASSESS-DOCD LE1/YR: CPT | Mod: CPTII,S$GLB,, | Performed by: INTERNAL MEDICINE

## 2022-03-23 PROCEDURE — 3077F PR MOST RECENT SYSTOLIC BLOOD PRESSURE >= 140 MM HG: ICD-10-PCS | Mod: CPTII,S$GLB,, | Performed by: INTERNAL MEDICINE

## 2022-03-23 PROCEDURE — 3288F PR FALLS RISK ASSESSMENT DOCUMENTED: ICD-10-PCS | Mod: CPTII,S$GLB,, | Performed by: INTERNAL MEDICINE

## 2022-03-23 PROCEDURE — 1159F PR MEDICATION LIST DOCUMENTED IN MEDICAL RECORD: ICD-10-PCS | Mod: CPTII,S$GLB,, | Performed by: INTERNAL MEDICINE

## 2022-03-23 PROCEDURE — 1101F PR PT FALLS ASSESS DOC 0-1 FALLS W/OUT INJ PAST YR: ICD-10-PCS | Mod: CPTII,S$GLB,, | Performed by: INTERNAL MEDICINE

## 2022-03-23 PROCEDURE — 3008F BODY MASS INDEX DOCD: CPT | Mod: CPTII,S$GLB,, | Performed by: INTERNAL MEDICINE

## 2022-03-23 PROCEDURE — 4010F ACE/ARB THERAPY RXD/TAKEN: CPT | Mod: CPTII,S$GLB,, | Performed by: INTERNAL MEDICINE

## 2022-03-23 PROCEDURE — 1159F MED LIST DOCD IN RCRD: CPT | Mod: CPTII,S$GLB,, | Performed by: INTERNAL MEDICINE

## 2022-03-23 PROCEDURE — 99214 PR OFFICE/OUTPT VISIT, EST, LEVL IV, 30-39 MIN: ICD-10-PCS | Mod: S$GLB,,, | Performed by: INTERNAL MEDICINE

## 2022-03-23 PROCEDURE — 3079F DIAST BP 80-89 MM HG: CPT | Mod: CPTII,S$GLB,, | Performed by: INTERNAL MEDICINE

## 2022-03-23 PROCEDURE — 1126F AMNT PAIN NOTED NONE PRSNT: CPT | Mod: CPTII,S$GLB,, | Performed by: INTERNAL MEDICINE

## 2022-03-23 PROCEDURE — 3079F PR MOST RECENT DIASTOLIC BLOOD PRESSURE 80-89 MM HG: ICD-10-PCS | Mod: CPTII,S$GLB,, | Performed by: INTERNAL MEDICINE

## 2022-03-23 PROCEDURE — 3077F SYST BP >= 140 MM HG: CPT | Mod: CPTII,S$GLB,, | Performed by: INTERNAL MEDICINE

## 2022-03-23 NOTE — PROGRESS NOTES
Subjective:       Patient ID: Cathy Reynolds is a 66 y.o. female.    Chief Complaint: Breast Cancer    Diagnsosis: DCIS rt breast        HPI: Patient is  a 66 y.o. female  seen today f/u  for Rt breast cancer. She had abnormal screening mammogram 21. Follow-up mammogram () showed Right breast 72 mm calcifications at the posterior 6 o'clock position. A stereotactic biopsy was performed on 21. 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 Patient does routinely do self breast exams.  Patient has not noted a change on breast exam.  Patient denies nipple discharge. Patient denies to previous breast biopsy. 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 Pathology shows DCIS, Grade 2 . Margins uninvolved with distance from 12 mm with pathologic staging pTis pN0. She has declined adjuvant endocrine therapy.    Interval Hx: Pt arrived > 20 min late for visit  Accompanied by     No new issues  Doing well  No SOB/CP        GYN History:Age of menarche was 11. Age of menopause was 48.  Last menstrual period was 48. Patient denies hormonal therapy. Patient is . Age of first live birth was 31. Patient did not breast feed.    Fam Hx: Maternal Aunt Breast CA 66     Past Medical History:   Diagnosis Date    AR (allergic rhinitis)     CHF (congestive heart failure)     Diverticulosis     Ear pain, right     Elevated LFTs     borderline - due to OTC herbals - resolved    History of colon polyps     Hyperlipidemia     borderline with high HDL    Hypertension     Mild anxiety     Osteoporosis, postmenopausal     Postmenopausal status     Ringing in ear, right     Underweight     Vitamin D  deficiency          Past Medical History:   Diagnosis Date    AR (allergic rhinitis)     CHF (congestive heart failure)     Diverticulosis     Ear pain, right     Elevated LFTs     borderline - due to OTC herbals - resolved    History of colon polyps     Hyperlipidemia     borderline with high HDL    Hypertension     Mild anxiety     Osteoporosis, postmenopausal     Postmenopausal status     Ringing in ear, right     Underweight     Vitamin D deficiency        Past Surgical History:   Procedure Laterality Date    AXILLARY NODE DISSECTION Right 2021    Procedure: LYMPHADENECTOMY, AXILLARY;  Surgeon: Kaylynn Dickinson MD;  Location: [facility];  Service: General;  Laterality: Right;  RN PREOP 21---COVID ON 9/10-NEGATIVE--HAS CARDS CLEARANCE---     SECTION, LOW TRANSVERSE      COLONOSCOPY      COLONOSCOPY N/A 2020    Procedure: COLONOSCOPY;  Surgeon: STEFFANIE Gordon MD;  Location: [address]);  Service: Endoscopy;  Laterality: N/A;  COVID test on 20 at [facility] - sm    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    INJECTION FOR SENTINEL NODE IDENTIFICATION Right 2021    Procedure: INJECTION, FOR SENTINEL NODE IDENTIFICATION;  Surgeon: Kaylynn Dickinson MD;  Location: [facility] OR;  Service: General;  Laterality: Right;    MYOMECTOMY      polyp removal from cervix      SENTINEL LYMPH NODE BIOPSY Right 2021    Procedure: BIOPSY, LYMPH NODE, SENTINEL;  Surgeon: Kaylynn Dickinson MD;  Location: [facility] OR;  Service: General;  Laterality: Right;    UNILATERAL MASTECTOMY Right 2021    Procedure: MASTECTOMY, UNILATERAL;  Surgeon: Kaylynn Dickinson MD;  Location: [facility];  Service: General;  Laterality: Right;  NEED-CONSENT, H/P, ORDERS       Review of Systems   Constitutional: Negative for appetite change, fatigue, fever and unexpected weight change.   HENT: Negative for mouth sores.    Eyes: Negative for visual disturbance.   Respiratory:  "Negative for cough and shortness of breath.    Cardiovascular: Negative for chest pain.   Gastrointestinal: Negative for abdominal pain and diarrhea.   Genitourinary: Negative for frequency.   Musculoskeletal: Negative for back pain.   Integumentary:  Negative for rash.   Neurological: Negative for headaches.   Hematological: Negative for adenopathy.   Psychiatric/Behavioral: The patient is not nervous/anxious.          Objective:       Vitals:    03/23/22 0833 03/23/22 0912 03/23/22 0930   BP: (!) 200/101 (!) 178/98 (!) 140/80   BP Location: Left arm Left arm Left arm   Patient Position: Sitting Sitting Sitting   BP Method:  Large (Manual) Large (Manual)   Pulse: 87     Temp: 98.1 °F (36.7 °C)     TempSrc: Oral     SpO2: 98%     Weight: 41.3 kg (91 lb 0.8 oz)     Height: 5' 3" (1.6 m)           Physical Exam  Constitutional:       Appearance: She is well-developed.   HENT:      Head: Normocephalic.   Eyes:      General: Lids are normal. No scleral icterus.     Conjunctiva/sclera: Conjunctivae normal.   Neck:      Thyroid: No thyromegaly.   Cardiovascular:      Rate and Rhythm: Normal rate and regular rhythm.      Heart sounds: Normal heart sounds. No murmur heard.  Pulmonary:      Breath sounds: Normal breath sounds. No wheezing or rales.   Chest:   Breasts:      Right: No supraclavicular adenopathy.      Left: No mass, nipple discharge, skin change or supraclavicular adenopathy.        Comments: Rt chest wall-well-healed incision site  Abdominal:      General: Bowel sounds are normal.      Palpations: Abdomen is soft.      Tenderness: There is no abdominal tenderness. There is no guarding or rebound.   Musculoskeletal:         General: No tenderness. Normal range of motion.      Cervical back: Normal range of motion and neck supple.   Lymphadenopathy:      Cervical: No cervical adenopathy.      Upper Body:      Right upper body: No supraclavicular adenopathy.      Left upper body: No supraclavicular adenopathy. "   Skin:     General: Skin is warm and dry.      Findings: No ecchymosis, erythema, petechiae or rash.   Neurological:      Mental Status: She is alert and oriented to person, place, and time.      Cranial Nerves: No cranial nerve deficit.      Coordination: Coordination normal.       Final pathology showed   Part 1   Lymph nodes (2, submitted as right axillary sentinel node hot 656):   -No evidence of malignancy on H&E or immunohistochemical stains   Part 2   Lymph node (1, submitted as right axillary sentinel node hot 171):   -No evidence of malignancy on H&E or immunohistochemical stains   Part 3   Breast with skin and nipple (total mastectomy, submitted as right breast):   -Ductal carcinoma in situ (DCIS), intermediate nuclear grade, papillary and   cribriform patterns with focal necrosis   -Carcinoma consists of an aggregate of involved ducts microscopically   measuring 21 x 9 mm (2.1 x 0.9 cm) located at 6 o'clock   -Carcinoma is closest to the posterior margin which is 1.3 cm distant.  All   margins, skin, and nipple are free of malignancy.   -Breast biomarkers (based on previous [facility] surgical specimen   [facility]- reported June 9, 2021):        -ER Positive (strong, 90%)        -RI - Positive (isyl-ya-ifavaikzuknt, 20%)   -Uninvolved breast shows prominent fibrocystic changes and focal ductal   epithelial hyperplasia without atypia (UDH)   -AJCC TN: pTis (DCIS) pN0(sn)   -Complete AJCC/CAP synoptic staging form follows:   STAGING FORM for DCIS of the BREAST (CAP/AJCC February 2020 Protocol)   Procedure:  Total mastectomy with skin and nipple   Specimen laterality:  Right   Estimated size (extent) of DCIS is at least:  21 mm   Histologic type: Ductal carcinoma in situ   Nuclear grade: Grade II (intermediate)   Necrosis: Present, focal   Margins: Uninvolved by DCIS        -Distance from closest margin:  12 mm        -Specify closest margin (required only if less than 2 mm):  Posterior   Regional  lymph nodes (pN):        -Uninvolved by tumor cells             -Total Number of Lymph Nodes Examined:  3             -Number of [place] Nodes Examined: 3   Pathologic Stage Classification (pTNM):        -Primary tumor (pT):             -pTis (DCIS)        -Regional lymph nodes (pN):             -Regional Lymph Nodes Modifier: (sn)  ([place] nodes evaluated)             -Category (pN): pN0 (No regional lymph node metastasis identified)   Block for possible molecular studies:  3F     Assessment:       1. Ductal carcinoma in situ (DCIS) of breast, unspecified laterality    2. Postmenopausal    3. Essential hypertension        Plan:    1.,2.This is a 66 y.o. female with a stage pTis N0 M0 grade 2 ER + MI + HER2 not reported DCIS of the right breast.  The patient is status post right unilaateral mastectomy and sentinel node biopsy on 9/13/2021.  Previously Discussed adjuvant endocrine therapy in terms of risk reduction and pros versus cons of endocrine therapy.     Following a detailed discussion, pt has elected not to pursue adjuvant endocrine therapy  Plan bone density     3. REPEAT BP improved    F/U 4MOS      All questions posed answered to patient's satisfaction.  Cc: Kaylynn Dickinson MD

## 2022-03-29 ENCOUNTER — OFFICE VISIT (OUTPATIENT)
Dept: FAMILY MEDICINE | Facility: CLINIC | Age: 67
End: 2022-03-29
Payer: COMMERCIAL

## 2022-03-29 VITALS
BODY MASS INDEX: 15.93 KG/M2 | DIASTOLIC BLOOD PRESSURE: 78 MMHG | TEMPERATURE: 99 F | SYSTOLIC BLOOD PRESSURE: 140 MMHG | OXYGEN SATURATION: 98 % | WEIGHT: 89.94 LBS | HEART RATE: 84 BPM

## 2022-03-29 DIAGNOSIS — E55.9 VITAMIN D DEFICIENCY: ICD-10-CM

## 2022-03-29 DIAGNOSIS — I10 ESSENTIAL HYPERTENSION: ICD-10-CM

## 2022-03-29 DIAGNOSIS — M62.830 SPASM OF MUSCLE OF LOWER BACK: ICD-10-CM

## 2022-03-29 DIAGNOSIS — J30.1 SEASONAL ALLERGIC RHINITIS DUE TO POLLEN: Primary | ICD-10-CM

## 2022-03-29 DIAGNOSIS — R63.6 UNDERWEIGHT: ICD-10-CM

## 2022-03-29 DIAGNOSIS — D33.3 ACOUSTIC NEUROMA: ICD-10-CM

## 2022-03-29 DIAGNOSIS — M62.838 MUSCLE SPASM: ICD-10-CM

## 2022-03-29 PROCEDURE — 3288F FALL RISK ASSESSMENT DOCD: CPT | Mod: CPTII,S$GLB,, | Performed by: NURSE PRACTITIONER

## 2022-03-29 PROCEDURE — 3288F PR FALLS RISK ASSESSMENT DOCUMENTED: ICD-10-PCS | Mod: CPTII,S$GLB,, | Performed by: NURSE PRACTITIONER

## 2022-03-29 PROCEDURE — 4010F PR ACE/ARB THEARPY RXD/TAKEN: ICD-10-PCS | Mod: CPTII,S$GLB,, | Performed by: NURSE PRACTITIONER

## 2022-03-29 PROCEDURE — 99999 PR PBB SHADOW E&M-EST. PATIENT-LVL IV: ICD-10-PCS | Mod: PBBFAC,,,

## 2022-03-29 PROCEDURE — 3008F BODY MASS INDEX DOCD: CPT | Mod: CPTII,S$GLB,, | Performed by: NURSE PRACTITIONER

## 2022-03-29 PROCEDURE — 1159F PR MEDICATION LIST DOCUMENTED IN MEDICAL RECORD: ICD-10-PCS | Mod: CPTII,S$GLB,, | Performed by: NURSE PRACTITIONER

## 2022-03-29 PROCEDURE — 3008F PR BODY MASS INDEX (BMI) DOCUMENTED: ICD-10-PCS | Mod: CPTII,S$GLB,, | Performed by: NURSE PRACTITIONER

## 2022-03-29 PROCEDURE — 1159F MED LIST DOCD IN RCRD: CPT | Mod: CPTII,S$GLB,, | Performed by: NURSE PRACTITIONER

## 2022-03-29 PROCEDURE — 99999 PR PBB SHADOW E&M-EST. PATIENT-LVL IV: CPT | Mod: PBBFAC,,,

## 2022-03-29 PROCEDURE — 3078F PR MOST RECENT DIASTOLIC BLOOD PRESSURE < 80 MM HG: ICD-10-PCS | Mod: CPTII,S$GLB,, | Performed by: NURSE PRACTITIONER

## 2022-03-29 PROCEDURE — 3078F DIAST BP <80 MM HG: CPT | Mod: CPTII,S$GLB,, | Performed by: NURSE PRACTITIONER

## 2022-03-29 PROCEDURE — 99214 PR OFFICE/OUTPT VISIT, EST, LEVL IV, 30-39 MIN: ICD-10-PCS | Mod: S$GLB,,, | Performed by: NURSE PRACTITIONER

## 2022-03-29 PROCEDURE — 1100F PTFALLS ASSESS-DOCD GE2>/YR: CPT | Mod: CPTII,S$GLB,, | Performed by: NURSE PRACTITIONER

## 2022-03-29 PROCEDURE — 1126F AMNT PAIN NOTED NONE PRSNT: CPT | Mod: CPTII,S$GLB,, | Performed by: NURSE PRACTITIONER

## 2022-03-29 PROCEDURE — 99214 OFFICE O/P EST MOD 30 MIN: CPT | Mod: S$GLB,,, | Performed by: NURSE PRACTITIONER

## 2022-03-29 PROCEDURE — 3077F SYST BP >= 140 MM HG: CPT | Mod: CPTII,S$GLB,, | Performed by: NURSE PRACTITIONER

## 2022-03-29 PROCEDURE — 1126F PR PAIN SEVERITY QUANTIFIED, NO PAIN PRESENT: ICD-10-PCS | Mod: CPTII,S$GLB,, | Performed by: NURSE PRACTITIONER

## 2022-03-29 PROCEDURE — 1100F PR PT FALLS ASSESS DOC 2+ FALLS/FALL W/INJURY/YR: ICD-10-PCS | Mod: CPTII,S$GLB,, | Performed by: NURSE PRACTITIONER

## 2022-03-29 PROCEDURE — 3077F PR MOST RECENT SYSTOLIC BLOOD PRESSURE >= 140 MM HG: ICD-10-PCS | Mod: CPTII,S$GLB,, | Performed by: NURSE PRACTITIONER

## 2022-03-29 PROCEDURE — 4010F ACE/ARB THERAPY RXD/TAKEN: CPT | Mod: CPTII,S$GLB,, | Performed by: NURSE PRACTITIONER

## 2022-03-29 RX ORDER — BACLOFEN 10 MG/1
10 TABLET ORAL 3 TIMES DAILY PRN
Qty: 21 TABLET | Refills: 0 | Status: SHIPPED | OUTPATIENT
Start: 2022-03-29 | End: 2022-04-18

## 2022-03-29 NOTE — PROGRESS NOTES
HPI     Chief Complaint:  Chief Complaint   Patient presents with    Sinus Problem    Tingling     Right hand       Cathy Reynolds is a 66 y.o. female with multiple medical diagnoses as listed in the medical history and problem list that presents for sinusitis.  Pt is new to me but is known to this clinic with her last appointment being 10/19/2021.      Sinus Problem  This is a recurrent problem. The current episode started 1 to 4 weeks ago. The problem is unchanged. There has been no fever. She is experiencing no pain. Associated symptoms include congestion, ear pain (right), a hoarse voice (resolved), shortness of breath (resolved at rest), sinus pressure and a sore throat. Pertinent negatives include no chills, coughing, diaphoresis, headaches, neck pain, sneezing or swollen glands.     Pt reports mild relief with flonase. She has a Hx of seasonal allergies for which she was taking Allegra. Pt denies taking Allergra regularly. States she has been outdoors more than usual in the past month. Denies fever, dyspnea, or chest pain.     Muscle spasm:  New onset < 1 week. Located to left lower back. 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 Described as spasms and moderate pain to area. Denies red flag symptoms, numbness or weakness to BLE.     Patient is doing well and has no other major complaints today.  she is compliant with medications daily without any adverse side effects.    History     Past Medical History:  Past Medical History:   Diagnosis Date    AR (allergic rhinitis)     CHF (congestive heart failure)     Diverticulosis     Ear pain, right     Elevated LFTs     borderline - due to OTC herbals - resolved    History of colon polyps     Hyperlipidemia     borderline with high HDL    Hypertension     Mild anxiety     Osteoporosis, postmenopausal     Postmenopausal status     Ringing in ear, right     Underweight     Vitamin D deficiency        Past  Surgical History:  Past Surgical History:   Procedure Laterality Date    AXILLARY NODE DISSECTION Right 2021    Procedure: LYMPHADENECTOMY, AXILLARY;  Surgeon: Kaylynn Dickinson MD;  Location: [facility] OR;  Service: General;  Laterality: Right;  RN PREOP 21---COVID ON 9/10-NEGATIVE--HAS CARDS CLEARANCE---     SECTION, LOW TRANSVERSE      COLONOSCOPY      COLONOSCOPY N/A 2020    Procedure: COLONOSCOPY;  Surgeon: STEFFANIE Gordon MD;  Location: [facility] ENDO [address]);  Service: Endoscopy;  Laterality: N/A;  [facility] -     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;  Service: General;  Laterality: Right;    MYOMECTOMY      polyp removal from cervix      SENTINEL LYMPH NODE BIOPSY Right 2021    Procedure: BIOPSY, LYMPH NODE, SENTINEL;  Surgeon: Kaylynn Dickinson MD;  Location: [facility] OR;  Service: General;  Laterality: Right;    UNILATERAL MASTECTOMY Right 2021    Procedure: MASTECTOMY, UNILATERAL;  Surgeon: Kaylynn Dickinson MD;  Location: [facility];  Service: General;  Laterality: Right;  NEED-CONSENT, H/P, ORDERS       Social History:  Social History     Socioeconomic History    Marital status:     Number of children: 2   Tobacco Use    Smoking status: Never Smoker    Smokeless tobacco: Never Used   Substance and Sexual Activity    Alcohol use: No    Drug use: No    Sexual activity: Yes     Partners: Male     Birth control/protection: None   Social History Narrative    Teacher at [employer]           Family History:  Family History   Problem Relation Age of Onset    Hypertension Mother     Hypertension Brother     Hypertension Sister     Diabetes Sister     Breast cancer Maternal Aunt     Colon cancer Neg Hx     Ovarian cancer Neg Hx         Allergies and Medications: (updated and reviewed)  Review of patient's allergies indicates:   Allergen Reactions    Ace inhibitors      Other reaction(s): cough    Diltiazem Other (See Comments)     - rash, lip numb, weak    Norvasc [amlodipine]      MUSCLE TWITCHING     Penicillins Hives     Current Outpatient Medications   Medication Sig Dispense Refill    alendronate (FOSAMAX) 70 MG tablet Take 1 tablet (70 mg total) by mouth every 7 days. 12 tablet 0    anastrozole (ARIMIDEX) 1 mg Tab Take 1 tablet (1 mg total) by mouth once daily. 30 tablet 6    cholecalciferol, vitamin D3, (VITAMIN D3) 50 mcg (2,000 unit) Cap Take 2 capsules by mouth once daily.       clotrimazole-betamethasone 1-0.05% (LOTRISONE) cream Apply topically 2 (two) times daily.      co-enzyme Q-10 30 mg capsule Take 30 mg by mouth once daily.      fluticasone propionate (FLONASE) 50 mcg/actuation nasal spray USE 1 SPRAY IN EACH NOSTRIL TWICE A DAY 16 g 2    folic acid/multivit-min/lutein (CENTRUM SILVER ORAL) Take by mouth.      ketoconazole (NIZORAL) 2 % shampoo Apply topically twice a week.      olmesartan (BENICAR) 5 MG Tab TAKE 1 TABLET(5 MG) BY MOUTH EVERY DAY 90 tablet 3    olopatadine (PATANOL) 0.1 % ophthalmic solution INT 1 GTT ON OU BID  4    baclofen (LIORESAL) 10 MG tablet Take 1 tablet (10 mg total) by mouth 3 (three) times daily as needed (muscle spasm). 21 tablet 0     No current facility-administered medications for this visit.       Exam     Review of Systems:  (as noted above)  Review of Systems   Constitutional: Negative for chills, diaphoresis, fever and unexpected weight change.   HENT: Positive for congestion, ear pain (right), hoarse voice (resolved), sinus pressure and sore throat. Negative for hearing loss, rhinorrhea, sneezing and trouble swallowing.    Eyes: Negative for discharge, redness and visual disturbance.   Respiratory: Positive for shortness of breath (resolved at rest). Negative for cough,  chest tightness and wheezing.    Cardiovascular: Negative for chest pain, palpitations and leg swelling.   Gastrointestinal: Negative for abdominal pain, constipation, diarrhea, nausea and vomiting.   Endocrine: Negative for polydipsia, polyphagia and polyuria.   Genitourinary: Negative for decreased urine volume, dysuria and hematuria.   Musculoskeletal: Negative for arthralgias, joint swelling, myalgias and neck pain.   Skin: Negative for color change and rash.   Neurological: Negative for dizziness, weakness, light-headedness and headaches.   Psychiatric/Behavioral: Negative for decreased concentration, dysphoric mood, sleep disturbance and suicidal ideas.       Physical Exam:   Physical Exam  Constitutional:       General: She is not in acute distress.     Appearance: Normal appearance. She is not ill-appearing or diaphoretic.   HENT:      Head: Normocephalic and atraumatic.      Nose: Congestion present.      Right Turbinates: Enlarged.      Left Turbinates: Enlarged.      Right Sinus: Maxillary sinus tenderness and frontal sinus tenderness present.      Left Sinus: Maxillary sinus tenderness and frontal sinus tenderness present.      Mouth/Throat:      Pharynx: No oropharyngeal exudate or posterior oropharyngeal erythema.   Eyes:      General: No scleral icterus.  Neck:      Vascular: No carotid bruit.   Cardiovascular:      Rate and Rhythm: Normal rate and regular rhythm.      Pulses: Normal pulses.      Heart sounds: No murmur heard.    No friction rub. No gallop.   Pulmonary:      Effort: No respiratory distress.      Breath sounds: No wheezing.   Chest:      Chest wall: No tenderness.   Musculoskeletal:         General: Tenderness present.      Cervical back: No rigidity or tenderness.      Lumbar back: Spasms and tenderness present. No swelling or signs of trauma. Normal range of motion. Negative right straight leg raise test and negative left straight leg raise test.        Back:       Comments: Red =  area of pain and spasms.   Lymphadenopathy:      Cervical: No cervical adenopathy.   Skin:     Capillary Refill: Capillary refill takes 2 to 3 seconds.      Findings: No rash.   Neurological:      Mental Status: She is alert.      Cranial Nerves: No cranial nerve deficit.      Sensory: No sensory deficit.      Motor: No weakness.   Psychiatric:         Mood and Affect: Mood normal.       Vitals:    03/29/22 1710   BP: (!) 140/78   Pulse: 84   Temp: 98.6 °F (37 °C)   TempSrc: Oral   SpO2: 98%   Weight: 40.8 kg (89 lb 15.2 oz)      Body mass index is 15.93 kg/m².    Assessment & Plan   (all problems are new to me)      Problem List Items Addressed This Visit        ENT    Acoustic neuroma    Current Assessment & Plan     The current medical regimen is effective;  continue present plan               Cardiac/Vascular    Essential hypertension    Current Assessment & Plan     -/78  -continue current medication regimen  -DASH diet, regular cardiovascular exercises, portion control  - ?weight loss  -f/u with BP logs in 2 weeks if BP is not consistently <140/90                  Endocrine    Underweight    Current Assessment & Plan     Body mass index is 15.93 kg/m².      Advised pt to increase intake of protein and healthy fats. Discussed importance of adequate nutrition to overall health and potential complications of further weight loss.            Vitamin D deficiency    Current Assessment & Plan     The current medical regimen is effective;  continue present plan and medications.                Other    AR (allergic rhinitis)    Current Assessment & Plan     Symptoms have recently increased in severity. Likely due to exposure of outdoor triggers/season change. Maxillary and frontal sinus tenderness on exam.     Restart allegra, continue flonase, avoid triggers.    Education provided on treating sinusitis and AR.     Increase hydration. Discussed use of Neti pot with sterile water.     ED precautions given.    Notify provider if symptoms do not resolve or increase in severity.   Patient verbalizes understanding and agrees with plan of care.             Spasm of muscle of lower back - Primary    Current Assessment & Plan     -exam c/w mm spasm with no red flags. Left lumbar paraspinal muscle TTP with spasm.   -reassurance provided   -advised pt that pain may last up to 4-6 weeks, but in the meantime, advised to add heat/massage and avoid provocative movements that could worsen pain, maintain good posture, as well as using proper lifting techniques.  -judicious use of tylenol prn   -start baclofen prn    ED precautions given.   Notify provider if symptoms do not resolve or increase in severity.   Patient verbalizes understanding and agrees with plan of care.                 Relevant Medications    baclofen (LIORESAL) 10 MG tablet      Other Visit Diagnoses     Muscle spasm              --------------------------------------------    Health Maintenance:  Health Maintenance       Date Due Completion Date    Shingles Vaccine (1 of 2) Never done ---    DEXA Scan 03/10/2022 3/10/2020    Override on 8/11/2011: Done    Override on 8/11/2011: Done    Mammogram 05/19/2022 5/19/2021    Override on 2/22/2016: Done    Colorectal Cancer Screening 09/08/2025 9/8/2020    Override on 12/27/2006: Done    Lipid Panel 02/27/2026 2/27/2021    TETANUS VACCINE 07/17/2029 7/17/2019          Health maintenance reviewed. Mammogram and DEXA scheduled. Vaccines offered patient declined at this time     Follow Up:  Follow up in about 2 weeks (around 4/12/2022), or if symptoms worsen or fail to improve.      The patient expressed understanding and no barriers to adherence were identified.      1. The patient indicates understanding of these issues and agrees with the plan. Brief care plan is updated and reviewed with the patient as applicable.      2. The patient is given an After Visit Summary that lists all medications with directions, allergies,  education, orders placed during this encounter and follow-up instructions.      3. I have reviewed the patient's medical information including past medical, family, and social history sections including the medications and allergies.      4. We discussed the patient's current medications.     This note was created by combination of typed  and MModal dictation.  Transcription errors may be present.  If there are any questions, please contact me.       Michele Karimi NP

## 2022-03-29 NOTE — ASSESSMENT & PLAN NOTE
-/78  -continue current medication regimen  -DASH diet, regular cardiovascular exercises, portion control  - ?weight loss  -f/u with BP logs in 2 weeks if BP is not consistently <140/90

## 2022-03-29 NOTE — ASSESSMENT & PLAN NOTE
Symptoms have recently increased in severity. Likely due to exposure of outdoor triggers/season change. Maxillary and frontal sinus tenderness on exam.     Restart allegra, continue flonase, avoid triggers.    Education provided on treating sinusitis and AR.     Increase hydration. Discussed use of Neti pot with sterile water.     ED precautions given.   Notify provider if symptoms do not resolve or increase in severity.   Patient verbalizes understanding and agrees with plan of care.

## 2022-03-29 NOTE — ASSESSMENT & PLAN NOTE
-exam c/w mm spasm with no red flags. Left lumbar paraspinal muscle TTP with spasm.   -reassurance provided   -advised pt that pain may last up to 4-6 weeks, but in the meantime, advised to add heat/massage and avoid provocative movements that could worsen pain, maintain good posture, as well as using proper lifting techniques.  -judicious use of tylenol prn   -start baclofen prn    ED precautions given.   Notify provider if symptoms do not resolve or increase in severity.   Patient verbalizes understanding and agrees with plan of care.

## 2022-03-29 NOTE — ASSESSMENT & PLAN NOTE
Body mass index is 15.93 kg/m².      Advised pt to increase intake of protein and healthy fats. Discussed importance of adequate nutrition to overall health and potential complications of further weight loss.

## 2022-03-29 NOTE — PATIENT INSTRUCTIONS
Patient Education       Sinusitis Discharge Instructions, Adult   About this topic   Your sinuses are hollow areas in the bones of your face. They have a thin lining that normally makes a small amount of mucus. When you have sinusitis, the lining gets swollen and makes extra mucus. You may have sinusitis with or after a cold. Most of the time sinusitis will get better in 1 to 2 weeks.  Sinusitis is most often caused by a virus, so antibiotics wont help. But some people do need antibiotics. If the doctor ordered antibiotics for you, be sure to follow the instructions. It is important to take all of your antibiotics even if you start to feel better.       What care is needed at home?   Ask your doctor what you need to do when you go home. Make sure you ask questions if you do not understand what you need to do.  Try to thin the mucus.  Drink lots of liquids to stay hydrated.  Use a cool mist humidifier to avoid dry air.  Use saline nose drops or a saline nose rinse to relieve stuffiness.  Wash your hands often. This will help keep others healthy.  Do not smoke or be in smoke-filled places. Avoid things that may cause breathing problems like fumes, pollution, dust, and other common allergens and irritants.  You may want to take medicine like ibuprofen, naproxen, or acetaminophen to help with pain.  What follow-up care is needed?   Your doctor may ask you to make visits to the office to check on your progress. Be sure to keep your visits.  Your doctor will tell you if other tests are needed.  Your doctor may send you for allergy tests or to an allergy expert.   What lifestyle changes are needed?   Avoid drinks that contain caffeine or alcohol.  Try to stop smoking. Avoid being around others who smoke. Smoke can damage the small hairs inside your sinuses.  What drugs may be needed?   The doctor may order drugs to:  Help with pain and swelling  Fight an infection  Control coughing  Dry up the sinuses  Help a runny or  stuffy nose  Will physical activity be limited?   You do not have to limit your activity. You may want to rest more if you have a fever or headache.   What problems could happen?   Infections that happen again and again  Asthma attack  Coughing  Loss of voice  What can be done to prevent this health problem?   Keep your nose as moist as possible. Use saline sprays, washes, and a humidifier often.  Avoid being around cigarette and cigar smoke or strong odors from chemicals.  Avoid long periods of swimming in pools treated with chlorine. This can bother the lining of the nose and sinuses.  Avoid water diving. This forces water into the sinuses from the nasal passages.  Manage your allergies with your doctor's help.  Use an air conditioner if allergies are a problem.  Before air travel, use a drug to dry up mucus. As the plane takes off or lands, the pressure can cause sinus pain.  When do I need to call the doctor?   You have a stiff neck, especially if you also have fever, chills, vomiting, or severe headache  You have trouble thinking clearly.  You have trouble seeing or have double vision.  You have swelling or redness or pain around one or both eyes.  You have a fever of 102°F (38.9°C) or higher, or have shaking chills or sweats.  You have an upset stomach and throwing up.  You have more pain in your face and head.  You are not getting better within 1 to 2 weeks.  Teach Back: Helping You Understand   The Teach Back Method helps you understand the information we are giving you. After you talk with the staff, tell them in your own words what you learned. This helps to make sure the staff has covered each thing clearly. It also helps to explain things that may have been confusing. Before going home, make sure you can do these:  I can tell you about my condition.  I can tell you what may help ease my breathing.  I can tell you what I will do if I have a fever, chills, or trouble breathing.  Where can I learn more?    American Academy of Family Physicians  https://familydoctor.org/condition/sinusitis/   Centers for Disease Control and Prevention  https://www.cdc.gov/antibiotic-use/community/for-hcp/outpatient-hcp/adult-treatment-rec.html   Last Reviewed Date   2021-06-09  Consumer Information Use and Disclaimer   This information is not specific medical advice and does not replace information you receive from your health care provider. This is only a brief summary of general information. It does NOT include all information about conditions, illnesses, injuries, tests, procedures, treatments, therapies, discharge instructions or life-style choices that may apply to you. You must talk with your health care provider for complete information about your health and treatment options. This information should not be used to decide whether or not to accept your health care providers advice, instructions or recommendations. Only your health care provider has the knowledge and training to provide advice that is right for you.  Copyright   Copyright © 2021 UpToDate, Inc. and its affiliates and/or licensors. All rights reserved.  Patient Education       Sinusitis in Adults   The Basics   Written by the doctors and editors at [employer]   What is sinusitis? -- Sinusitis is a condition that can cause a stuffy nose, pain in the face, and discharge (mucus) from the nose. The sinuses are hollow areas in the bones of the face (figure 1). They have a thin lining that normally makes a small amount of mucus. When this lining gets irritated or infected, it swells and makes extra mucus. This causes symptoms.  Sinusitis can occur when a person gets sick with a cold. The germs causing the cold can also infect the sinuses. Many times, a person feels like their cold is getting better. But then they get sinusitis and begin to feel sick again.  What are the symptoms of sinusitis? -- Common symptoms of sinusitis include:  Stuffy or blocked nose  Thick white,  yellow, or green discharge from the nose  Pain in the teeth  Pain or pressure in the face - This often feels worse when a person bends forward.  People with sinusitis can also have other symptoms that include:  Fever  Cough  Trouble smelling  Ear pressure or fullness  Headache  Bad breath  Feeling tired  Most of the time, symptoms start to improve in 7 to 10 days.  Should I see a doctor or nurse? -- See your doctor or nurse if your symptoms last more than 10 days, or if your symptoms first get better but then get worse.  Rarely, sinusitis can lead to serious problems. See your doctor or nurse right away (do not wait 10 days) if you have:  Fever higher than 102°F (38.9°C)  Sudden and severe pain in the face and head  Trouble seeing or seeing double  Trouble thinking clearly  Swelling or redness around one or both eyes  A stiff neck  Is there anything I can do on my own to feel better? -- Yes. To reduce your symptoms, you can:  Take an over-the-counter pain reliever to reduce the pain  Rinse your nose and sinuses with salt water a few times a day - Ask your doctor or nurse about the best way to do this.  Your doctor might also prescribe a steroid nose spray to reduce the swelling in your nose. (These kinds of steroid nose sprays are safe to take, and do not contain the same steroids that some athletes take illegally.)  How is sinusitis treated? -- Most of the time, sinusitis does not need to be treated with antibiotic medicines. This is because most sinusitis is caused by viruses, not bacteria, and antibiotics do not kill viruses. In fact, even sinusitis caused by bacteria will usually get better on its own without antibiotics.   Some people with sinusitis do need treatment with antibiotics. If your symptoms have not improved after 10 days, ask your doctor if you should take antibiotics. Your doctor might recommend that you wait 1 more week to see if your symptoms improve. But if you have symptoms such as a fever or a  "lot of pain, they might prescribe antibiotics. It is important to follow your doctor's instructions about taking your antibiotics.  What if my symptoms do not get better? -- If your symptoms do not get better, talk with your doctor or nurse. They might order tests to figure out why you still have symptoms. These can include:  CT scan or other imaging tests - Imaging tests create pictures of the inside of the body.  A test to look inside the sinuses - For this test, a doctor puts a thin tube with a camera on the end into the nose and up into the sinuses.  Some people get a lot of sinus infections or have symptoms that last at least 3 months. These people can have a different type of sinusitis called "chronic sinusitis." Chronic sinusitis can be caused by different things. For example, some people have growths inside their nose or sinuses that are called "polyps." Other people have allergies that cause their symptoms.  Chronic sinusitis can be treated in different ways. If you have chronic sinusitis, talk with your doctor about which treatments are right for you.  All topics are updated as new evidence becomes available and our peer review process is complete.  This topic retrieved from [employer] on: Sep 21, 2021.  Topic 23735 Version 17.0  Release: 29.4.2 - C29.263  © 2021 UpToDate, Inc. and/or its affiliates. All rights reserved.  figure 1: Sinuses of the face     This drawing shows the sinuses of the face, from the side and front views.  Graphic 205934 Version 1.0    Consumer Information Use and Disclaimer   This information is not specific medical advice and does not replace information you receive from your health care provider. This is only a brief summary of general information. It does NOT include all information about conditions, illnesses, injuries, tests, procedures, treatments, therapies, discharge instructions or life-style choices that may apply to you. You must talk with your health care provider for " complete information about your health and treatment options. This information should not be used to decide whether or not to accept your health care provider's advice, instructions or recommendations. Only your health care provider has the knowledge and training to provide advice that is right for you. The use of this information is governed by the [employer] End User License Agreement, available at https://www.[employer]/en/solutions/[employer]/about/theresa.The use of [employer] content is governed by the [employer] Terms of Use. ©2021 [employer] All rights reserved.  Copyright   © 2021 UpToDate, Inc. and/or its affiliates. All rights reserved.

## 2022-04-08 ENCOUNTER — HOSPITAL ENCOUNTER (OUTPATIENT)
Dept: RADIOLOGY | Facility: HOSPITAL | Age: 67
Discharge: HOME | End: 2022-04-08
Attending: SURGERY
Payer: COMMERCIAL

## 2022-04-08 DIAGNOSIS — Z12.31 BREAST CANCER SCREENING BY MAMMOGRAM: ICD-10-CM

## 2022-04-08 PROCEDURE — 77063 MAMMO DIGITAL SCREENING LEFT WITH TOMO: ICD-10-PCS | Mod: 26,,, | Performed by: RADIOLOGY

## 2022-04-08 PROCEDURE — 77067 MAMMO DIGITAL SCREENING LEFT WITH TOMO: ICD-10-PCS | Mod: 26,,, | Performed by: RADIOLOGY

## 2022-04-08 PROCEDURE — 77067 SCR MAMMO BI INCL CAD: CPT | Mod: TC

## 2022-04-08 PROCEDURE — 77063 BREAST TOMOSYNTHESIS BI: CPT | Mod: TC

## 2022-04-08 PROCEDURE — 77063 BREAST TOMOSYNTHESIS BI: CPT | Mod: 26,,, | Performed by: RADIOLOGY

## 2022-04-08 PROCEDURE — 77067 SCR MAMMO BI INCL CAD: CPT | Mod: 26,,, | Performed by: RADIOLOGY

## 2022-04-09 ENCOUNTER — PATIENT OUTREACH (OUTPATIENT)
Dept: ADMINISTRATIVE | Facility: OTHER | Age: 67
End: 2022-04-09
Payer: COMMERCIAL

## 2022-04-09 NOTE — PROGRESS NOTES
Health Maintenance Due   Topic Date Due    Shingles Vaccine (1 of 2) Never done    DEXA Scan  03/10/2022    Mammogram  05/19/2022     Updates were requested from care everywhere.  Chart was reviewed for overdue Proactive [facility] Encounters (JACKELINE) topics (CRS, Breast Cancer Screening, Eye exam)  Health Maintenance has been updated.  [facility] immunization registry triggered.  Immunizations were reconciled.

## 2022-04-11 ENCOUNTER — OFFICE VISIT (OUTPATIENT)
Dept: SURGERY | Facility: CLINIC | Age: 67
End: 2022-04-11
Payer: COMMERCIAL

## 2022-04-11 VITALS — WEIGHT: 89 LBS | BODY MASS INDEX: 15.77 KG/M2 | HEIGHT: 63 IN

## 2022-04-11 DIAGNOSIS — Z86.000 HISTORY OF DUCTAL CARCINOMA IN SITU (DCIS) OF BREAST: ICD-10-CM

## 2022-04-11 DIAGNOSIS — Z12.31 SCREENING MAMMOGRAM FOR HIGH-RISK PATIENT: Primary | ICD-10-CM

## 2022-04-11 PROCEDURE — 1126F PR PAIN SEVERITY QUANTIFIED, NO PAIN PRESENT: ICD-10-PCS | Mod: CPTII,S$GLB,, | Performed by: SURGERY

## 2022-04-11 PROCEDURE — 4010F PR ACE/ARB THEARPY RXD/TAKEN: ICD-10-PCS | Mod: CPTII,S$GLB,, | Performed by: SURGERY

## 2022-04-11 PROCEDURE — 99999 PR PBB SHADOW E&M-EST. PATIENT-LVL III: CPT | Mod: PBBFAC,,, | Performed by: SURGERY

## 2022-04-11 PROCEDURE — 3008F PR BODY MASS INDEX (BMI) DOCUMENTED: ICD-10-PCS | Mod: CPTII,S$GLB,, | Performed by: SURGERY

## 2022-04-11 PROCEDURE — 99999 PR PBB SHADOW E&M-EST. PATIENT-LVL III: ICD-10-PCS | Mod: PBBFAC,,, | Performed by: SURGERY

## 2022-04-11 PROCEDURE — 4010F ACE/ARB THERAPY RXD/TAKEN: CPT | Mod: CPTII,S$GLB,, | Performed by: SURGERY

## 2022-04-11 PROCEDURE — 3288F PR FALLS RISK ASSESSMENT DOCUMENTED: ICD-10-PCS | Mod: CPTII,S$GLB,, | Performed by: SURGERY

## 2022-04-11 PROCEDURE — 3288F FALL RISK ASSESSMENT DOCD: CPT | Mod: CPTII,S$GLB,, | Performed by: SURGERY

## 2022-04-11 PROCEDURE — 3008F BODY MASS INDEX DOCD: CPT | Mod: CPTII,S$GLB,, | Performed by: SURGERY

## 2022-04-11 PROCEDURE — 99213 PR OFFICE/OUTPT VISIT, EST, LEVL III, 20-29 MIN: ICD-10-PCS | Mod: S$GLB,,, | Performed by: SURGERY

## 2022-04-11 PROCEDURE — 99213 OFFICE O/P EST LOW 20 MIN: CPT | Mod: S$GLB,,, | Performed by: SURGERY

## 2022-04-11 PROCEDURE — 1100F PR PT FALLS ASSESS DOC 2+ FALLS/FALL W/INJURY/YR: ICD-10-PCS | Mod: CPTII,S$GLB,, | Performed by: SURGERY

## 2022-04-11 PROCEDURE — 1126F AMNT PAIN NOTED NONE PRSNT: CPT | Mod: CPTII,S$GLB,, | Performed by: SURGERY

## 2022-04-11 PROCEDURE — 1100F PTFALLS ASSESS-DOCD GE2>/YR: CPT | Mod: CPTII,S$GLB,, | Performed by: SURGERY

## 2022-04-11 NOTE — Clinical Note
Hi, I am so sorry for the confusion on this patient!  I was out last week, so also apologize for the delayed response as well.  When I saw her recently, the mammogram has not been read yet, so I didn't close her note - I think what you were seeing was the resident's initial note attempt (albeit not a very good one).  I am so sorry that this caused confusion.  I went over the plan with her in detail that day - we discussed again that she did not require radiation since she had clear margins and that the benefit of endocrine therapy would be very low - therefore Dr. Miller was ok with her deferring therapy. We would not give chemotherapy for DCIS, so I wouldn't recommend that and didn't find it anywhere in the note.  I am so sorry that this caused confusion and honestly did not even know that you could see the resident note without my signing it. I apologize!    Feel free to call or text anytime if you need something or something doesn't make sense on a patient!  My cell is [phone].  Kaylynn

## 2022-04-18 ENCOUNTER — OFFICE VISIT (OUTPATIENT)
Dept: FAMILY MEDICINE | Facility: CLINIC | Age: 67
End: 2022-04-18
Payer: COMMERCIAL

## 2022-04-18 VITALS
HEART RATE: 80 BPM | OXYGEN SATURATION: 98 % | DIASTOLIC BLOOD PRESSURE: 80 MMHG | BODY MASS INDEX: 16.55 KG/M2 | HEIGHT: 62 IN | TEMPERATURE: 98 F | WEIGHT: 89.94 LBS | SYSTOLIC BLOOD PRESSURE: 124 MMHG

## 2022-04-18 DIAGNOSIS — J30.1 SEASONAL ALLERGIC RHINITIS DUE TO POLLEN: ICD-10-CM

## 2022-04-18 DIAGNOSIS — Z00.00 ROUTINE MEDICAL EXAM: Primary | ICD-10-CM

## 2022-04-18 DIAGNOSIS — R63.6 UNDERWEIGHT: ICD-10-CM

## 2022-04-18 DIAGNOSIS — F41.9 MILD ANXIETY: ICD-10-CM

## 2022-04-18 DIAGNOSIS — M81.0 OSTEOPOROSIS, POSTMENOPAUSAL: ICD-10-CM

## 2022-04-18 DIAGNOSIS — D33.3 ACOUSTIC NEUROMA: ICD-10-CM

## 2022-04-18 DIAGNOSIS — Z23 NEED FOR SHINGLES VACCINE: ICD-10-CM

## 2022-04-18 DIAGNOSIS — I10 ESSENTIAL HYPERTENSION: ICD-10-CM

## 2022-04-18 DIAGNOSIS — E55.9 VITAMIN D DEFICIENCY: ICD-10-CM

## 2022-04-18 DIAGNOSIS — Z86.000 HISTORY OF DUCTAL CARCINOMA IN SITU (DCIS) OF BREAST: ICD-10-CM

## 2022-04-18 PROBLEM — M62.830 SPASM OF MUSCLE OF LOWER BACK: Status: RESOLVED | Noted: 2022-03-29 | Resolved: 2022-04-18

## 2022-04-18 PROCEDURE — 1126F PR PAIN SEVERITY QUANTIFIED, NO PAIN PRESENT: ICD-10-PCS | Mod: CPTII,S$GLB,, | Performed by: INTERNAL MEDICINE

## 2022-04-18 PROCEDURE — 99397 PR PREVENTIVE VISIT,EST,65 & OVER: ICD-10-PCS | Mod: 25,S$GLB,, | Performed by: INTERNAL MEDICINE

## 2022-04-18 PROCEDURE — 3008F PR BODY MASS INDEX (BMI) DOCUMENTED: ICD-10-PCS | Mod: CPTII,S$GLB,, | Performed by: INTERNAL MEDICINE

## 2022-04-18 PROCEDURE — 99999 PR PBB SHADOW E&M-EST. PATIENT-LVL IV: CPT | Mod: PBBFAC,,, | Performed by: INTERNAL MEDICINE

## 2022-04-18 PROCEDURE — 3288F PR FALLS RISK ASSESSMENT DOCUMENTED: ICD-10-PCS | Mod: CPTII,S$GLB,, | Performed by: INTERNAL MEDICINE

## 2022-04-18 PROCEDURE — 1160F RVW MEDS BY RX/DR IN RCRD: CPT | Mod: CPTII,S$GLB,, | Performed by: INTERNAL MEDICINE

## 2022-04-18 PROCEDURE — 3074F SYST BP LT 130 MM HG: CPT | Mod: CPTII,S$GLB,, | Performed by: INTERNAL MEDICINE

## 2022-04-18 PROCEDURE — 90750 ZOSTER RECOMBINANT VACCINE: ICD-10-PCS | Mod: S$GLB,,, | Performed by: INTERNAL MEDICINE

## 2022-04-18 PROCEDURE — 4010F ACE/ARB THERAPY RXD/TAKEN: CPT | Mod: CPTII,S$GLB,, | Performed by: INTERNAL MEDICINE

## 2022-04-18 PROCEDURE — 90750 HZV VACC RECOMBINANT IM: CPT | Mod: S$GLB,,, | Performed by: INTERNAL MEDICINE

## 2022-04-18 PROCEDURE — 1101F PR PT FALLS ASSESS DOC 0-1 FALLS W/OUT INJ PAST YR: ICD-10-PCS | Mod: CPTII,S$GLB,, | Performed by: INTERNAL MEDICINE

## 2022-04-18 PROCEDURE — 1159F PR MEDICATION LIST DOCUMENTED IN MEDICAL RECORD: ICD-10-PCS | Mod: CPTII,S$GLB,, | Performed by: INTERNAL MEDICINE

## 2022-04-18 PROCEDURE — 3079F PR MOST RECENT DIASTOLIC BLOOD PRESSURE 80-89 MM HG: ICD-10-PCS | Mod: CPTII,S$GLB,, | Performed by: INTERNAL MEDICINE

## 2022-04-18 PROCEDURE — 3008F BODY MASS INDEX DOCD: CPT | Mod: CPTII,S$GLB,, | Performed by: INTERNAL MEDICINE

## 2022-04-18 PROCEDURE — 1160F PR REVIEW ALL MEDS BY PRESCRIBER/CLIN PHARMACIST DOCUMENTED: ICD-10-PCS | Mod: CPTII,S$GLB,, | Performed by: INTERNAL MEDICINE

## 2022-04-18 PROCEDURE — 3288F FALL RISK ASSESSMENT DOCD: CPT | Mod: CPTII,S$GLB,, | Performed by: INTERNAL MEDICINE

## 2022-04-18 PROCEDURE — 4010F PR ACE/ARB THEARPY RXD/TAKEN: ICD-10-PCS | Mod: CPTII,S$GLB,, | Performed by: INTERNAL MEDICINE

## 2022-04-18 PROCEDURE — 3079F DIAST BP 80-89 MM HG: CPT | Mod: CPTII,S$GLB,, | Performed by: INTERNAL MEDICINE

## 2022-04-18 PROCEDURE — 90471 ZOSTER RECOMBINANT VACCINE: ICD-10-PCS | Mod: S$GLB,,, | Performed by: INTERNAL MEDICINE

## 2022-04-18 PROCEDURE — 1101F PT FALLS ASSESS-DOCD LE1/YR: CPT | Mod: CPTII,S$GLB,, | Performed by: INTERNAL MEDICINE

## 2022-04-18 PROCEDURE — 1126F AMNT PAIN NOTED NONE PRSNT: CPT | Mod: CPTII,S$GLB,, | Performed by: INTERNAL MEDICINE

## 2022-04-18 PROCEDURE — 1159F MED LIST DOCD IN RCRD: CPT | Mod: CPTII,S$GLB,, | Performed by: INTERNAL MEDICINE

## 2022-04-18 PROCEDURE — 99397 PER PM REEVAL EST PAT 65+ YR: CPT | Mod: 25,S$GLB,, | Performed by: INTERNAL MEDICINE

## 2022-04-18 PROCEDURE — 3074F PR MOST RECENT SYSTOLIC BLOOD PRESSURE < 130 MM HG: ICD-10-PCS | Mod: CPTII,S$GLB,, | Performed by: INTERNAL MEDICINE

## 2022-04-18 PROCEDURE — 99999 PR PBB SHADOW E&M-EST. PATIENT-LVL IV: ICD-10-PCS | Mod: PBBFAC,,, | Performed by: INTERNAL MEDICINE

## 2022-04-18 PROCEDURE — 90471 IMMUNIZATION ADMIN: CPT | Mod: S$GLB,,, | Performed by: INTERNAL MEDICINE

## 2022-04-18 RX ORDER — OLMESARTAN MEDOXOMIL 5 MG/1
5 TABLET ORAL DAILY
Qty: 90 TABLET | Refills: 3 | Status: SHIPPED | OUTPATIENT
Start: 2022-04-18 | End: 2023-06-19 | Stop reason: SDUPTHER

## 2022-04-18 NOTE — PROGRESS NOTES
HISTORY OF PRESENT ILLNESS:  Cathy Reynolds is a 66 y.o. female who presents to the clinic today for a routine physical exam. Her last physical exam was approximately 1 years(s) ago.        PAST MEDICAL HISTORY:  Past Medical History:   Diagnosis Date    AR (allergic rhinitis)     Breast cancer     RIGHT    CHF (congestive heart failure)     Diverticulosis     Ear pain, right     Elevated LFTs     borderline - due to OTC herbals - resolved    History of colon polyps     Hyperlipidemia     borderline with high HDL    Hypertension     Mild anxiety     Osteoporosis, postmenopausal     Postmenopausal status     Ringing in ear, right     Underweight     Vitamin D deficiency        PAST SURGICAL HISTORY:  Past Surgical History:   Procedure Laterality Date    AXILLARY NODE DISSECTION Right 2021    Procedure: LYMPHADENECTOMY, AXILLARY;  Surgeon: Kaylynn Dickinson MD;  Location: [facility] OR;  Service: General;  Laterality: Right;  RN PREOP 21---COVID ON 9/10-NEGATIVE--HAS CARDS CLEARANCE---    BREAST BIOPSY Right      SECTION, LOW TRANSVERSE      COLONOSCOPY      COLONOSCOPY N/A 2020    Procedure: COLONOSCOPY;  Surgeon: STEFFANIE Gordon MD;  Location: [address]);  Service: Endoscopy;  Laterality: N/A;  COVID test on 20 at [facility] - sm    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    INJECTION FOR SENTINEL NODE IDENTIFICATION Right 2021    Procedure: INJECTION, FOR SENTINEL NODE IDENTIFICATION;  Surgeon: Kaylynn Dickinson MD;  Location: [facility] OR;  Service: General;  Laterality: Right;    MASTECTOMY Right     DCIS    MYOMECTOMY      polyp removal from cervix      SENTINEL LYMPH NODE BIOPSY Right 2021    Procedure: BIOPSY, LYMPH NODE, SENTINEL;  Surgeon: Kaylynn Dickinson MD;  Location: [facility] OR;  Service: General;  Laterality: Right;    UNILATERAL MASTECTOMY Right 2021    Procedure: MASTECTOMY, UNILATERAL;  Surgeon: Kaylynn  AMARI Dickinson MD;  Location: [facility] OR;  Service: General;  Laterality: Right;  NEED-CONSENT, H/P, ORDERS       SOCIAL HISTORY:  Social History     Socioeconomic History    Marital status:     Number of children: 2   Tobacco Use    Smoking status: Never Smoker    Smokeless tobacco: Never Used   Substance and Sexual Activity    Alcohol use: No    Drug use: No    Sexual activity: Yes     Partners: Male     Birth control/protection: None   Social History Narrative    Teacher at [employer]           FAMILY HISTORY:  Family History   Problem Relation Age of Onset    Hypertension Mother     Hypertension Sister     Diabetes Sister     Hypertension Brother     Heart attack Brother     Breast cancer Maternal Aunt     Colon cancer Neg Hx     Ovarian cancer Neg Hx        ALLERGIES AND MEDICATIONS: updated and reviewed.  Review of patient's allergies indicates:   Allergen Reactions    Ace inhibitors      Other reaction(s): cough    Diltiazem Other (See Comments)     - rash, lip numb, weak    Norvasc [amlodipine]      MUSCLE TWITCHING     Penicillins Hives     Medication List with Changes/Refills   Current Medications    ALENDRONATE (FOSAMAX) 70 MG TABLET    Take 1 tablet (70 mg total) by mouth every 7 days.    CHOLECALCIFEROL, VITAMIN D3, (VITAMIN D3) 50 MCG (2,000 UNIT) CAP    Take 2 capsules by mouth once daily.     CLOTRIMAZOLE-BETAMETHASONE 1-0.05% (LOTRISONE) CREAM    Apply topically 2 (two) times daily.    CO-ENZYME Q-10 30 MG CAPSULE    Take 30 mg by mouth once daily.    FLUTICASONE PROPIONATE (FLONASE) 50 MCG/ACTUATION NASAL SPRAY    USE 1 SPRAY IN EACH NOSTRIL TWICE A DAY    FOLIC ACID/MULTIVIT-MIN/LUTEIN (CENTRUM SILVER ORAL)    Take by mouth.    KETOCONAZOLE (NIZORAL) 2 % SHAMPOO    Apply topically twice a week.    OLOPATADINE (PATANOL) 0.1 % OPHTHALMIC SOLUTION    INT 1 GTT ON OU BID   Changed and/or Refilled Medications    Modified Medication Previous Medication    OLMESARTAN (BENICAR) 5  MG TAB olmesartan (BENICAR) 5 MG Tab       Take 1 tablet (5 mg total) by mouth once daily.    TAKE 1 TABLET(5 MG) BY MOUTH EVERY DAY   Discontinued Medications    ANASTROZOLE (ARIMIDEX) 1 MG TAB    Take 1 tablet (1 mg total) by mouth once daily.    BACLOFEN (LIORESAL) 10 MG TABLET    Take 1 tablet (10 mg total) by mouth 3 (three) times daily as needed (muscle spasm).          CARE TEAM:  Patient Care Team:  Masha Elizondo MD as PCP - General (Internal Medicine)  Kezia Acuña LPN as Licensed Practical Nurse           SCREENING HISTORY:  Health Maintenance       Date Due Completion Date    DEXA Scan 03/10/2022 3/10/2020    Override on 8/11/2011: Done    Override on 8/11/2011: Done    Shingles Vaccine (2 of 2) 06/13/2022 4/18/2022    Mammogram 04/08/2023 4/8/2022    Override on 2/22/2016: Done    Colorectal Cancer Screening 09/08/2025 9/8/2020    Lipid Panel 02/27/2026 2/27/2021    TETANUS VACCINE 07/17/2029 7/17/2019            REVIEW OF SYSTEMS:   The patient reports: fair dietary habits - she admits to have some family stressors recently and has had some decrease in appetite because of this.  The patient reports : that they do not exercise regularly  Review of Systems   Constitutional: Negative for chills, fatigue, fever and unexpected weight change.   HENT: Negative for congestion and postnasal drip.    Eyes: Negative for pain and visual disturbance.   Respiratory: Negative for cough, shortness of breath and wheezing.    Cardiovascular: Negative for chest pain, palpitations and leg swelling.   Gastrointestinal: Positive for diarrhea (- she reports that she has several bowel movments every morning). Negative for abdominal pain, constipation, nausea and vomiting.   Genitourinary: Negative for dysuria.   Musculoskeletal: Negative for arthralgias and back pain.   Skin: Negative for rash.   Neurological: Negative for weakness and headaches.   Psychiatric/Behavioral: Negative for dysphoric mood and sleep  "disturbance. The patient is nervous/anxious.         She reports some stress in her life at this time due to the recent passing of her brother.      ROS (Optional)-: no pelvic pain  Breast ROS (Optional)-: negative for breast lumps/discharge            Physical Examination:   Vitals:    04/18/22 0840   BP: 124/80   Pulse:    Temp:      Weight: 40.8 kg (89 lb 15.2 oz)   Height: 5' 2.4" (158.5 cm)   Body mass index is 16.24 kg/m².      Patient did not require to have a chaperone present during the exam today.    General appearance - alert, well appearing, and in no distress, thin  Psychiatric - alert, oriented to person, place, and time, normal behavior, speech, dress, motor activity and thought process, mildly anxious (baseline)  Eyes - pupils equal and reactive, extraocular eye movements intact, sclera anicteric  Nose - mild swelling and bogginess of the turbinates bilaterally  Mouth - not examined; patient wearing mask due to Covid 19 pandemic  Neck - supple, no significant adenopathy, carotids upstroke normal bilaterally, no bruits  Lymphatics - no palpable cervical lymphadenopathy  Chest - clear to auscultation, no wheezes, rales or rhonchi, symmetric air entry  Heart - normal rate and regular rhythm, no gallops noted  Neurological - alert, normal speech, no focal findings or movement disorder noted, cranial nerves II through XII intact  Musculoskeletal - no joint tenderness, deformity or swelling, no muscular tenderness noted  Extremities - peripheral pulses normal, no pedal edema, no clubbing or cyanosis  Skin - normal coloration and turgor, no rashes, no suspicious skin lesions noted      Labs:  No labs needed at this time      ASSESSMENT AND PLAN:  1. Routine medical exam  Counseled on age appropriate medical preventative services including age appropriate cancer screenings, age appropriate eye and dental exams, over all nutritional health, need for a consistent exercise regimen, and an over all push " towards maintaining a vigorous and active lifestyle.  Counseled on age appropriate vaccines and discussed upcoming health care needs based on age/gender. Discussed good sleep hygiene and stress management.    2. Essential hypertension  The current medical regimen is effective;  continue present plan and medications. Recommended patient to check home readings to monitor and see me for followup as scheduled or sooner as needed.   Discussed sodium restriction, maintaining ideal body weight and regular exercise program as physiologic means to continue to achieve blood pressure control in addition to medication compliance.  Patient was educated that both decongestant and anti-inflammatory medication may raise blood pressure.  The patient declined participation in the digital hypertension program.  - olmesartan (BENICAR) 5 MG Tab; Take 1 tablet (5 mg total) by mouth once daily.  Dispense: 90 tablet; Refill: 3    3. History of ductal carcinoma in situ (DCIS) of breast  Followed by oncology.    4. Osteoporosis, postmenopausal/5. Vitamin D deficiency  We discussed adequate calcium and vitamin D supplementation. We discussed fall precautions. She is scheduled for her BMD. Continue current regimen (fosamax once a week). Further treatment plan will be based on results of bone density testing.    6. Mild anxiety  Stable. Observe.    7. Seasonal allergic rhinitis due to pollen  We discussed several treatment strategies: antihistamine at bedtime, flonase in the morning. We also discussed saline nasal rinse in the evening as needed. I recommended allergy covers for pillow and mattress. Patient will let me know if symptoms worsen or persist.    8. Acoustic neuroma  Stable. Followed by neurosurgery.      9. Underweight  We discussed healthy dietary habits. She is drinking Boost at this time and we discussed that this should not replace a regular meal (may be contributing to her unwanted bowel habits).    10. Need for shingles  vaccine  First dose given today.  - Zoster Recombinant Vaccine          Orders Placed This Encounter   Procedures    Zoster Recombinant Vaccine      Follow up in about 1 year (around 4/18/2023), or if symptoms worsen or fail to improve, for annual exam. or sooner as needed.

## 2022-04-25 ENCOUNTER — PATIENT MESSAGE (OUTPATIENT)
Dept: FAMILY MEDICINE | Facility: CLINIC | Age: 67
End: 2022-04-25
Payer: COMMERCIAL

## 2022-05-02 DIAGNOSIS — M81.0 OSTEOPOROSIS, POSTMENOPAUSAL: ICD-10-CM

## 2022-05-02 RX ORDER — ALENDRONATE SODIUM 70 MG/1
TABLET ORAL
Qty: 12 TABLET | Refills: 2 | Status: SHIPPED | OUTPATIENT
Start: 2022-05-02 | End: 2022-12-20

## 2022-05-21 DIAGNOSIS — J30.1 ACUTE SEASONAL ALLERGIC RHINITIS DUE TO POLLEN: ICD-10-CM

## 2022-05-21 NOTE — TELEPHONE ENCOUNTER
Refill Routing Note   Medication(s) are not appropriate for processing by [facility] for the following reason(s):      - Patient displays non-adherence to medications (has run out of medication supply over 6 months ago)    [facility] action(s):  Defer Medication-related problems identified: Non-adherence - intentional        Medication reconciliation completed: No     Appointments  past 12m or future 3m with PCP    Date Provider   Last Visit   4/18/2022 Masha Elizondo MD   Next Visit   Visit date not found Masha Elizondo MD   ED visits in past 90 days: 0        Note composed:4:09 PM 05/21/2022

## 2022-05-21 NOTE — TELEPHONE ENCOUNTER
No new care gaps identified.  [facility] Embedded Care Gaps. Reference number: [id number]. 5/21/2022   4:33:24 AM CDT

## 2022-05-23 DIAGNOSIS — J30.1 ACUTE SEASONAL ALLERGIC RHINITIS DUE TO POLLEN: ICD-10-CM

## 2022-05-23 RX ORDER — FLUTICASONE PROPIONATE 50 MCG
SPRAY, SUSPENSION (ML) NASAL
Qty: 16 G | Refills: 0 | Status: SHIPPED | OUTPATIENT
Start: 2022-05-23 | End: 2022-08-23

## 2022-05-23 RX ORDER — FLUTICASONE PROPIONATE 50 MCG
SPRAY, SUSPENSION (ML) NASAL
Qty: 48 G | OUTPATIENT
Start: 2022-05-23

## 2022-05-23 NOTE — TELEPHONE ENCOUNTER
No new care gaps identified.  [facility] Embedded Care Gaps. Reference number: [id number]. 5/23/2022   7:38:13 AM [email or web address]

## 2022-07-12 ENCOUNTER — HOSPITAL ENCOUNTER (OUTPATIENT)
Dept: RADIOLOGY | Facility: CLINIC | Age: 67
Discharge: HOME | End: 2022-07-12
Attending: INTERNAL MEDICINE
Payer: COMMERCIAL

## 2022-07-12 ENCOUNTER — OFFICE VISIT (OUTPATIENT)
Dept: FAMILY MEDICINE | Facility: CLINIC | Age: 67
End: 2022-07-12
Payer: COMMERCIAL

## 2022-07-12 VITALS
BODY MASS INDEX: 16.18 KG/M2 | DIASTOLIC BLOOD PRESSURE: 82 MMHG | OXYGEN SATURATION: 99 % | TEMPERATURE: 99 F | HEART RATE: 78 BPM | SYSTOLIC BLOOD PRESSURE: 144 MMHG | WEIGHT: 89.63 LBS

## 2022-07-12 DIAGNOSIS — Z78.0 POSTMENOPAUSAL: ICD-10-CM

## 2022-07-12 DIAGNOSIS — F41.9 MILD ANXIETY: ICD-10-CM

## 2022-07-12 DIAGNOSIS — Z02.89 OTHER GENERAL MEDICAL EXAMINATION FOR ADMINISTRATIVE PURPOSES: Primary | ICD-10-CM

## 2022-07-12 DIAGNOSIS — I10 ESSENTIAL HYPERTENSION: ICD-10-CM

## 2022-07-12 DIAGNOSIS — J30.1 SEASONAL ALLERGIC RHINITIS DUE TO POLLEN: ICD-10-CM

## 2022-07-12 DIAGNOSIS — M81.0 OSTEOPOROSIS, POSTMENOPAUSAL: ICD-10-CM

## 2022-07-12 PROCEDURE — 3079F DIAST BP 80-89 MM HG: CPT | Mod: CPTII,S$GLB,, | Performed by: NURSE PRACTITIONER

## 2022-07-12 PROCEDURE — 4010F ACE/ARB THERAPY RXD/TAKEN: CPT | Mod: CPTII,S$GLB,, | Performed by: NURSE PRACTITIONER

## 2022-07-12 PROCEDURE — 3008F BODY MASS INDEX DOCD: CPT | Mod: CPTII,S$GLB,, | Performed by: NURSE PRACTITIONER

## 2022-07-12 PROCEDURE — 3288F PR FALLS RISK ASSESSMENT DOCUMENTED: ICD-10-PCS | Mod: CPTII,S$GLB,, | Performed by: NURSE PRACTITIONER

## 2022-07-12 PROCEDURE — 77080 DXA BONE DENSITY AXIAL: CPT | Mod: TC,PO

## 2022-07-12 PROCEDURE — 99999 PR PBB SHADOW E&M-EST. PATIENT-LVL IV: ICD-10-PCS | Mod: PBBFAC,,, | Performed by: NURSE PRACTITIONER

## 2022-07-12 PROCEDURE — 1125F PR PAIN SEVERITY QUANTIFIED, PAIN PRESENT: ICD-10-PCS | Mod: CPTII,S$GLB,, | Performed by: NURSE PRACTITIONER

## 2022-07-12 PROCEDURE — 3077F SYST BP >= 140 MM HG: CPT | Mod: CPTII,S$GLB,, | Performed by: NURSE PRACTITIONER

## 2022-07-12 PROCEDURE — 99213 PR OFFICE/OUTPT VISIT, EST, LEVL III, 20-29 MIN: ICD-10-PCS | Mod: S$GLB,,, | Performed by: NURSE PRACTITIONER

## 2022-07-12 PROCEDURE — 3288F FALL RISK ASSESSMENT DOCD: CPT | Mod: CPTII,S$GLB,, | Performed by: NURSE PRACTITIONER

## 2022-07-12 PROCEDURE — 1101F PT FALLS ASSESS-DOCD LE1/YR: CPT | Mod: CPTII,S$GLB,, | Performed by: NURSE PRACTITIONER

## 2022-07-12 PROCEDURE — 1159F MED LIST DOCD IN RCRD: CPT | Mod: CPTII,S$GLB,, | Performed by: NURSE PRACTITIONER

## 2022-07-12 PROCEDURE — 3008F PR BODY MASS INDEX (BMI) DOCUMENTED: ICD-10-PCS | Mod: CPTII,S$GLB,, | Performed by: NURSE PRACTITIONER

## 2022-07-12 PROCEDURE — 1159F PR MEDICATION LIST DOCUMENTED IN MEDICAL RECORD: ICD-10-PCS | Mod: CPTII,S$GLB,, | Performed by: NURSE PRACTITIONER

## 2022-07-12 PROCEDURE — 99213 OFFICE O/P EST LOW 20 MIN: CPT | Mod: S$GLB,,, | Performed by: NURSE PRACTITIONER

## 2022-07-12 PROCEDURE — 3079F PR MOST RECENT DIASTOLIC BLOOD PRESSURE 80-89 MM HG: ICD-10-PCS | Mod: CPTII,S$GLB,, | Performed by: NURSE PRACTITIONER

## 2022-07-12 PROCEDURE — 4010F PR ACE/ARB THEARPY RXD/TAKEN: ICD-10-PCS | Mod: CPTII,S$GLB,, | Performed by: NURSE PRACTITIONER

## 2022-07-12 PROCEDURE — 3077F PR MOST RECENT SYSTOLIC BLOOD PRESSURE >= 140 MM HG: ICD-10-PCS | Mod: CPTII,S$GLB,, | Performed by: NURSE PRACTITIONER

## 2022-07-12 PROCEDURE — 77080 DXA BONE DENSITY AXIAL: CPT | Mod: 26,,, | Performed by: INTERNAL MEDICINE

## 2022-07-12 PROCEDURE — 1101F PR PT FALLS ASSESS DOC 0-1 FALLS W/OUT INJ PAST YR: ICD-10-PCS | Mod: CPTII,S$GLB,, | Performed by: NURSE PRACTITIONER

## 2022-07-12 PROCEDURE — 99999 PR PBB SHADOW E&M-EST. PATIENT-LVL IV: CPT | Mod: PBBFAC,,, | Performed by: NURSE PRACTITIONER

## 2022-07-12 PROCEDURE — 1125F AMNT PAIN NOTED PAIN PRSNT: CPT | Mod: CPTII,S$GLB,, | Performed by: NURSE PRACTITIONER

## 2022-07-12 PROCEDURE — 77080 DEXA BONE DENSITY SPINE HIP: ICD-10-PCS | Mod: 26,,, | Performed by: INTERNAL MEDICINE

## 2022-07-12 NOTE — ASSESSMENT & PLAN NOTE
-continue current medication regimen  -DASH diet, regular cardiovascular exercises, portion control  - ?weight loss  -f/u with BP logs in 2 weeks if BP is not consistently <140/90

## 2022-07-12 NOTE — PROGRESS NOTES
HPI     Chief Complaint:  Chief Complaint   Patient presents with    Employment Physical       Cathy Reynolds is a 67 y.o. female with multiple medical diagnoses as listed in the medical history and problem list that presents for work form completion.  Pt is known to me with his her last appointment 2022.      Pt requesting form completion for her to return to work.     she is compliant with medications daily without any adverse side effects.    Patient Care Team:  Masha Elizondo MD as PCP - General (Internal Medicine)  Kezia Acuña LPN as Licensed Practical Nurse    History     Past Medical History:  Past Medical History:   Diagnosis Date    AR (allergic rhinitis)     Breast cancer     RIGHT    CHF (congestive heart failure)     Diverticulosis     Ear pain, right     Elevated LFTs     borderline - due to OTC herbals - resolved    History of colon polyps     Hyperlipidemia     borderline with high HDL    Hypertension     Mild anxiety     Osteoporosis, postmenopausal     Postmenopausal status     Ringing in ear, right     Underweight     Vitamin D deficiency        Past Surgical History:  Past Surgical History:   Procedure Laterality Date    AXILLARY NODE DISSECTION Right 2021    Procedure: LYMPHADENECTOMY, AXILLARY;  Surgeon: Kaylynn Dickinson MD;  Location: [facility];  Service: General;  Laterality: Right;  RN PREOP 21---COVID ON 9/10-NEGATIVE--HAS CARDS CLEARANCE---    BREAST BIOPSY Right      SECTION, LOW TRANSVERSE      COLONOSCOPY      COLONOSCOPY N/A 2020    Procedure: COLONOSCOPY;  Surgeon: STEFFANIE Gordon MD;  Location: [address]);  Service: Endoscopy;  Laterality: N/A;  COVID test on 20 at [facility] -     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    INJECTION FOR SENTINEL NODE IDENTIFICATION Right 2021    Procedure: INJECTION, FOR SENTINEL NODE IDENTIFICATION;  Surgeon: Kaylynn Dickinson MD;   Location: [facility] OR;  Service: General;  Laterality: Right;    MASTECTOMY Right 2021    DCIS    MYOMECTOMY      polyp removal from cervix      SENTINEL LYMPH NODE BIOPSY Right 09/13/2021    Procedure: BIOPSY, LYMPH NODE, SENTINEL;  Surgeon: Kaylynn Dickinson MD;  Location: [facility] OR;  Service: General;  Laterality: Right;    UNILATERAL MASTECTOMY Right 09/13/2021    Procedure: MASTECTOMY, UNILATERAL;  Surgeon: Kaylynn Dickinson MD;  Location: [facility] OR;  Service: General;  Laterality: Right;  NEED-CONSENT, H/P, ORDERS       Social History:  Social History     Socioeconomic History    Marital status:     Number of children: 2   Tobacco Use    Smoking status: Never Smoker    Smokeless tobacco: Never Used   Substance and Sexual Activity    Alcohol use: No    Drug use: No    Sexual activity: Yes     Partners: Male     Birth control/protection: None   Social History Narrative    Teacher at [employer]           Family History:  Family History   Problem Relation Age of Onset    Hypertension Mother     Hypertension Sister     Diabetes Sister     Hypertension Brother     Heart attack Brother     Breast cancer Maternal Aunt     Colon cancer Neg Hx     Ovarian cancer Neg Hx        Allergies and Medications: (updated and reviewed)  Review of patient's allergies indicates:   Allergen Reactions    Ace inhibitors      Other reaction(s): cough    Diltiazem Other (See Comments)     - rash, lip numb, weak    Norvasc [amlodipine]      MUSCLE TWITCHING     Penicillins Hives     Current Outpatient Medications   Medication Sig Dispense Refill    alendronate (FOSAMAX) 70 MG tablet TAKE 1 TABLET(70 MG) BY MOUTH EVERY 7 DAYS 12 tablet 2    cholecalciferol, vitamin D3, (VITAMIN D3) 50 mcg (2,000 unit) Cap Take 2 capsules by mouth once daily.       clotrimazole-betamethasone 1-0.05% (LOTRISONE) cream Apply topically 2 (two) times daily.      co-enzyme Q-10 30 mg capsule Take 30 mg by mouth once daily.       fluticasone propionate (FLONASE) 50 mcg/actuation nasal spray SHAKE LIQUID AND USE 1 SPRAY IN EACH NOSTRIL TWICE DAILY 16 g 0    folic acid/multivit-min/lutein (CENTRUM SILVER ORAL) Take by mouth.      olmesartan (BENICAR) 5 MG Tab Take 1 tablet (5 mg total) by mouth once daily. 90 tablet 3    ketoconazole (NIZORAL) 2 % shampoo Apply topically twice a week.       No current facility-administered medications for this visit.       Exam     Review of Systems:  (as noted above)  Review of Systems   Constitutional: Negative for diaphoresis and fever.   HENT: Negative for trouble swallowing and voice change.    Eyes: Negative for visual disturbance.   Respiratory: Negative for chest tightness, shortness of breath and wheezing.    Cardiovascular: Negative for chest pain and palpitations.   Gastrointestinal: Negative for anal bleeding and blood in stool.   Endocrine: Negative for polydipsia and polyphagia.   Genitourinary: Negative for decreased urine volume, hematuria and vaginal pain.   Musculoskeletal: Negative for neck pain.   Skin: Negative for rash and wound.   Neurological: Negative for seizures and speech difficulty.   Psychiatric/Behavioral: Negative for confusion, decreased concentration, self-injury and suicidal ideas.       Physical Exam:   Physical Exam  Constitutional:       General: She is not in acute distress.     Appearance: She is underweight. She is not ill-appearing or diaphoretic.   HENT:      Head: Normocephalic and atraumatic.   Eyes:      General: No scleral icterus.     Pupils: Pupils are equal, round, and reactive to light.   Neck:      Vascular: No carotid bruit.   Cardiovascular:      Rate and Rhythm: Normal rate and regular rhythm.      Pulses: Normal pulses.      Heart sounds: No murmur heard.    No friction rub. No gallop.   Pulmonary:      Effort: No respiratory distress.      Breath sounds: No wheezing.   Chest:      Chest wall: No tenderness.   Musculoskeletal:         General: No signs  of injury.      Cervical back: No rigidity or tenderness.   Lymphadenopathy:      Cervical: No cervical adenopathy.   Skin:     Capillary Refill: Capillary refill takes 2 to 3 seconds.      Findings: No rash.   Neurological:      Mental Status: She is alert.      Cranial Nerves: No cranial nerve deficit.      Sensory: No sensory deficit.      Motor: No weakness.       Vitals:    07/12/22 0950 07/12/22 1009   BP: (!) 150/88 (!) 144/82   BP Location: Right arm    Pulse: 78    Temp: 98.5 °F (36.9 °C)    TempSrc: Oral    SpO2: 99%    Weight: 40.6 kg (89 lb 9.9 oz)       Body mass index is 16.18 kg/m².    Assessment & Plan     Problem List Items Addressed This Visit        Psychiatric    Mild anxiety    Current Assessment & Plan     Encouraged the patient to perform self-calming techniques, such as deep breathing/relaxation techniques and exercise.                ENT    AR (allergic rhinitis)    Current Assessment & Plan     The current medical regimen is effective;  continue present plan and medications.                Cardiac/Vascular    Essential hypertension    Current Assessment & Plan     BP (!) 144/82   Pulse 78   Temp 98.5 °F (36.9 °C) (Oral)   Wt 40.6 kg (89 lb 9.9 oz)   SpO2 99%   BMI 16.18 kg/m²      -pt reports she has a Hx of white coat hypertension. Reports BP at home is controlled averaging 120-130/70-80.   -continue current medication regimen  -DASH diet, regular cardiovascular exercises, portion control  - ?weight loss  -f/u with BP logs in 2 weeks if BP is not consistently <140/90                    Orthopedic    Osteoporosis, postmenopausal    Overview     3/2017 - Dr. Rockwell (endo) recommendation:  Rec: Consider Reclast, Prolia, raloxifene  Patient plans on not taking above and taking Algacal plus strontium  I explained the latter not approved in [place] but used in [place]  BMD repeat with PCP in 2018     My preference is Prolia    4/3/2018 will try Fosamax again     3/2020 - No need for Rx tx at  this time.            Current Assessment & Plan     The current medical regimen is effective;  continue present plan and medications.               Other Visit Diagnoses     Other general medical examination for administrative purposes    -  Primary    Completed return to work form.           --------------------------------------------      Health Maintenance:  Health Maintenance       Date Due Completion Date    DEXA Scan 03/10/2022 3/10/2020    Override on 8/11/2011: Done    Override on 8/11/2011: Done    COVID-19 Vaccine (4 - Booster for Moderna series) 03/30/2022 11/30/2021    Influenza Vaccine (1) 09/01/2022 10/19/2021    Mammogram 04/08/2023 4/8/2022    Override on 2/22/2016: Done    Colorectal Cancer Screening 09/08/2025 9/8/2020    Lipid Panel 02/27/2026 2/27/2021    TETANUS VACCINE 07/17/2029 7/17/2019          Health maintenance reviewed.    Follow Up:  Follow up in about 6 months (around 1/12/2023), or if symptoms worsen or fail to improve.      The patient expressed understanding and no barriers to adherence were identified.      - The patient indicates understanding of these issues and agrees with the plan. Brief care plan is updated and reviewed with the patient as applicable.      - The patient is given an After Visit Summary that lists all medications with directions, allergies, education, orders placed during this encounter and follow-up instructions.      - I have reviewed the patient's medical information including past medical, family, and social history sections including the medications and allergies.      - We discussed the patient's current medications.     This note was created by combination of typed  and MModal dictation.  Transcription errors may be present.  If there are any questions, please contact me.       Michele Karimi NP

## 2022-07-12 NOTE — ASSESSMENT & PLAN NOTE
Encouraged the patient to perform self-calming techniques, such as deep breathing/relaxation techniques and exercise.

## 2022-08-23 DIAGNOSIS — J30.1 ACUTE SEASONAL ALLERGIC RHINITIS DUE TO POLLEN: ICD-10-CM

## 2022-08-23 RX ORDER — FLUTICASONE PROPIONATE 50 MCG
SPRAY, SUSPENSION (ML) NASAL
Qty: 48 G | OUTPATIENT
Start: 2022-08-23

## 2022-08-23 RX ORDER — FLUTICASONE PROPIONATE 50 MCG
SPRAY, SUSPENSION (ML) NASAL
Qty: 16 G | Refills: 1 | Status: SHIPPED | OUTPATIENT
Start: 2022-08-23 | End: 2023-02-13

## 2022-08-23 NOTE — TELEPHONE ENCOUNTER
Refill Decision Note   Cathy Baronejalen  is requesting a refill authorization.  Brief Assessment and Rationale for Refill:  Quick Discontinue     Medication Therapy Plan:       Medication Reconciliation Completed: No   Comments:     No Care Gaps recommended.     Note composed:2:50 PM 08/23/2022

## 2022-08-23 NOTE — TELEPHONE ENCOUNTER
No new care gaps identified.  [facility] Embedded Care Gaps. Reference number: [id number]. 8/23/2022   2:06:03 PM CDT

## 2022-08-23 NOTE — TELEPHONE ENCOUNTER
No new care gaps identified.  [facility] Embedded Care Gaps. Reference number: [id number]. 8/23/2022   4:33:46 AM [id number]

## 2022-08-23 NOTE — TELEPHONE ENCOUNTER
Refill Routing Note   Medication(s) are not appropriate for processing by [facility] for the following reason(s):      - Medication is a new start (<3 months)    [facility] action(s):  Defer          Medication reconciliation completed: No     Appointments  past 12m or future 3m with PCP    Date Provider   Last Visit   4/18/2022 Masha Elizondo MD   Next Visit   Visit date not found Masha Elizondo MD   ED visits in past 90 days: 0        Note composed:7:32 AM 08/23/2022

## 2022-09-27 ENCOUNTER — OFFICE VISIT (OUTPATIENT)
Dept: HEMATOLOGY/ONCOLOGY | Facility: CLINIC | Age: 67
End: 2022-09-27
Payer: COMMERCIAL

## 2022-09-27 ENCOUNTER — TELEPHONE (OUTPATIENT)
Dept: FAMILY MEDICINE | Facility: CLINIC | Age: 67
End: 2022-09-27
Payer: COMMERCIAL

## 2022-09-27 VITALS
WEIGHT: 90.19 LBS | DIASTOLIC BLOOD PRESSURE: 102 MMHG | TEMPERATURE: 98 F | SYSTOLIC BLOOD PRESSURE: 158 MMHG | BODY MASS INDEX: 15.98 KG/M2 | HEART RATE: 97 BPM | HEIGHT: 63 IN | OXYGEN SATURATION: 99 %

## 2022-09-27 DIAGNOSIS — I10 ESSENTIAL HYPERTENSION: ICD-10-CM

## 2022-09-27 DIAGNOSIS — D05.10 DUCTAL CARCINOMA IN SITU (DCIS) OF BREAST, UNSPECIFIED LATERALITY: Primary | ICD-10-CM

## 2022-09-27 DIAGNOSIS — M85.80 OSTEOPENIA, UNSPECIFIED LOCATION: ICD-10-CM

## 2022-09-27 PROCEDURE — 1126F PR PAIN SEVERITY QUANTIFIED, NO PAIN PRESENT: ICD-10-PCS | Mod: CPTII,S$GLB,, | Performed by: INTERNAL MEDICINE

## 2022-09-27 PROCEDURE — 99999 PR PBB SHADOW E&M-EST. PATIENT-LVL IV: CPT | Mod: PBBFAC,,, | Performed by: INTERNAL MEDICINE

## 2022-09-27 PROCEDURE — 99214 OFFICE O/P EST MOD 30 MIN: CPT | Mod: S$GLB,,, | Performed by: INTERNAL MEDICINE

## 2022-09-27 PROCEDURE — 3288F PR FALLS RISK ASSESSMENT DOCUMENTED: ICD-10-PCS | Mod: CPTII,S$GLB,, | Performed by: INTERNAL MEDICINE

## 2022-09-27 PROCEDURE — 3008F BODY MASS INDEX DOCD: CPT | Mod: CPTII,S$GLB,, | Performed by: INTERNAL MEDICINE

## 2022-09-27 PROCEDURE — 3077F SYST BP >= 140 MM HG: CPT | Mod: CPTII,S$GLB,, | Performed by: INTERNAL MEDICINE

## 2022-09-27 PROCEDURE — 4010F ACE/ARB THERAPY RXD/TAKEN: CPT | Mod: CPTII,S$GLB,, | Performed by: INTERNAL MEDICINE

## 2022-09-27 PROCEDURE — 1159F MED LIST DOCD IN RCRD: CPT | Mod: CPTII,S$GLB,, | Performed by: INTERNAL MEDICINE

## 2022-09-27 PROCEDURE — 1126F AMNT PAIN NOTED NONE PRSNT: CPT | Mod: CPTII,S$GLB,, | Performed by: INTERNAL MEDICINE

## 2022-09-27 PROCEDURE — 99999 PR PBB SHADOW E&M-EST. PATIENT-LVL IV: ICD-10-PCS | Mod: PBBFAC,,, | Performed by: INTERNAL MEDICINE

## 2022-09-27 PROCEDURE — 4010F PR ACE/ARB THEARPY RXD/TAKEN: ICD-10-PCS | Mod: CPTII,S$GLB,, | Performed by: INTERNAL MEDICINE

## 2022-09-27 PROCEDURE — 3080F PR MOST RECENT DIASTOLIC BLOOD PRESSURE >= 90 MM HG: ICD-10-PCS | Mod: CPTII,S$GLB,, | Performed by: INTERNAL MEDICINE

## 2022-09-27 PROCEDURE — 3288F FALL RISK ASSESSMENT DOCD: CPT | Mod: CPTII,S$GLB,, | Performed by: INTERNAL MEDICINE

## 2022-09-27 PROCEDURE — 99214 PR OFFICE/OUTPT VISIT, EST, LEVL IV, 30-39 MIN: ICD-10-PCS | Mod: S$GLB,,, | Performed by: INTERNAL MEDICINE

## 2022-09-27 PROCEDURE — 3008F PR BODY MASS INDEX (BMI) DOCUMENTED: ICD-10-PCS | Mod: CPTII,S$GLB,, | Performed by: INTERNAL MEDICINE

## 2022-09-27 PROCEDURE — 1101F PT FALLS ASSESS-DOCD LE1/YR: CPT | Mod: CPTII,S$GLB,, | Performed by: INTERNAL MEDICINE

## 2022-09-27 PROCEDURE — 3080F DIAST BP >= 90 MM HG: CPT | Mod: CPTII,S$GLB,, | Performed by: INTERNAL MEDICINE

## 2022-09-27 PROCEDURE — 3077F PR MOST RECENT SYSTOLIC BLOOD PRESSURE >= 140 MM HG: ICD-10-PCS | Mod: CPTII,S$GLB,, | Performed by: INTERNAL MEDICINE

## 2022-09-27 PROCEDURE — 1159F PR MEDICATION LIST DOCUMENTED IN MEDICAL RECORD: ICD-10-PCS | Mod: CPTII,S$GLB,, | Performed by: INTERNAL MEDICINE

## 2022-09-27 PROCEDURE — 1101F PR PT FALLS ASSESS DOC 0-1 FALLS W/OUT INJ PAST YR: ICD-10-PCS | Mod: CPTII,S$GLB,, | Performed by: INTERNAL MEDICINE

## 2022-09-27 NOTE — TELEPHONE ENCOUNTER
----- Message from Audrey Wakeeney sent at 9/27/2022  3:37 PM CDT -----  .Type: Patient Call Back    Who called: self     What is the request in detail: would like to speak with physician regarding current medication     Can the clinic reply by [facility]?    Would the patient rather a call back or a response via [facility]? Call     Best call back number: .[phone] requesting call back after 5

## 2022-09-27 NOTE — PROGRESS NOTES
Subjective:       Patient ID: Cathy Reynolds is a 67 y.o. female.    Chief Complaint: No chief complaint on file.    Diagnosis: DCIS pTis pN0 rt breast  ERpos SC pos    status post right  mastectomy and SLNB on 20.    HPI: Patient is  a 66 y.o. female  seen today f/u  for Rt breast cancer. She had abnormal screening mammogram 21. Follow-up mammogram () showed Right breast 72 mm calcifications at the posterior 6 o'clock position. A stereotactic biopsy was performed on 21. 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  Patient has not noted a change on breast exam.  Patient denies nipple discharge. Patient denies to previous breast biopsy. 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 Pathology shows DCIS, Grade 2 . Margins uninvolved with distance from 12 mm with pathologic staging pTis pN0. She has declined adjuvant endocrine therapy.    Interval Hx:   No new issues  Doing well  Appetite and weight stalbe  No SOB/CP        GYN History:Age of menarche was 11. Age of menopause was 48.  Last menstrual period was 48. Patient denies hormonal therapy. Patient is . Age of first live birth was 31. Patient did not breast feed.    Fam Hx: Maternal Aunt Breast CA 66     Past Medical History:   Diagnosis Date    AR (allergic rhinitis)     Breast cancer     RIGHT    CHF (congestive heart failure)     Diverticulosis     Ear pain, right     Elevated LFTs     borderline - due to OTC herbals - resolved    History of colon polyps     Hyperlipidemia     borderline with high HDL    Hypertension     Mild anxiety     Osteoporosis, postmenopausal     Postmenopausal status      Ringing in ear, right     Underweight     Vitamin D deficiency          Past Medical History:   Diagnosis Date    AR (allergic rhinitis)     Breast cancer     RIGHT    CHF (congestive heart failure)     Diverticulosis     Ear pain, right     Elevated LFTs     borderline - due to OTC herbals - resolved    History of colon polyps     Hyperlipidemia     borderline with high HDL    Hypertension     Mild anxiety     Osteoporosis, postmenopausal     Postmenopausal status     Ringing in ear, right     Underweight     Vitamin D deficiency        Past Surgical History:   Procedure Laterality Date    AXILLARY NODE DISSECTION Right 2021    Procedure: LYMPHADENECTOMY, AXILLARY;  Surgeon: Kaylynn Dickinson MD;  Location: [facility] OR;  Service: General;  Laterality: Right;  RN PREOP 21---COVID ON 9/10-NEGATIVE--HAS CARDS CLEARANCE---    BREAST BIOPSY Right      SECTION, LOW TRANSVERSE      COLONOSCOPY      COLONOSCOPY N/A 2020    Procedure: COLONOSCOPY;  Surgeon: STEFFANIE Gordon MD;  Location: [address]);  Service: Endoscopy;  Laterality: N/A;  COVID test on 20 at [facility] - sm    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    INJECTION FOR SENTINEL NODE IDENTIFICATION Right 2021    Procedure: INJECTION, FOR SENTINEL NODE IDENTIFICATION;  Surgeon: Kaylynn Dickinson MD;  Location: [facility] OR;  Service: General;  Laterality: Right;    MASTECTOMY Right     DCIS    MYOMECTOMY      polyp removal from cervix      SENTINEL LYMPH NODE BIOPSY Right 2021    Procedure: BIOPSY, LYMPH NODE, SENTINEL;  Surgeon: Kaylynn Dickinson MD;  Location: [facility] OR;  Service: General;  Laterality: Right;    UNILATERAL MASTECTOMY Right 2021    Procedure: MASTECTOMY, UNILATERAL;  Surgeon: Kaylynn Dickinson MD;  Location: [facility] OR;  Service: General;  Laterality: Right;  NEED-CONSENT, H/P, ORDERS       Review of Systems   Constitutional:  Negative for appetite change, fatigue, fever and  "unexpected weight change.   HENT:  Negative for mouth sores.    Eyes:  Negative for visual disturbance.   Respiratory:  Negative for cough and shortness of breath.    Cardiovascular:  Negative for chest pain.   Gastrointestinal:  Negative for abdominal pain and diarrhea.   Genitourinary:  Negative for frequency.   Musculoskeletal:  Negative for back pain.   Integumentary:  Negative for rash.   Neurological:  Negative for headaches.   Hematological:  Negative for adenopathy.   Psychiatric/Behavioral:  The patient is not nervous/anxious.        Objective:       Vitals:    09/27/22 1502 09/27/22 1524   BP: (!) 198/106 (!) 158/102   BP Location: Right arm    Patient Position: Sitting    BP Method: Large (Automatic)    Pulse: 97    Temp: 98.4 °F (36.9 °C)    SpO2: 99%    Weight: 40.9 kg (90 lb 2.7 oz)    Height: 5' 3" (1.6 m)          Physical Exam  Constitutional:       Appearance: She is well-developed.   HENT:      Head: Normocephalic.   Eyes:      General: Lids are normal. No scleral icterus.     Conjunctiva/sclera: Conjunctivae normal.   Neck:      Thyroid: No thyromegaly.   Cardiovascular:      Rate and Rhythm: Normal rate and regular rhythm.      Heart sounds: Normal heart sounds. No murmur heard.  Pulmonary:      Breath sounds: Normal breath sounds. No wheezing or rales.   Chest:   Breasts:     Left: No mass, nipple discharge or skin change.      Comments: Rt chest wall-well-healed incision site  Abdominal:      General: Bowel sounds are normal.      Palpations: Abdomen is soft.      Tenderness: There is no abdominal tenderness. There is no guarding or rebound.   Musculoskeletal:         General: No tenderness. Normal range of motion.      Cervical back: Normal range of motion and neck supple.   Lymphadenopathy:      Cervical: No cervical adenopathy.      Upper Body:      Right upper body: No supraclavicular adenopathy.      Left upper body: No supraclavicular adenopathy.   Skin:     General: Skin is warm and " dry.      Findings: No ecchymosis, erythema, petechiae or rash.   Neurological:      Mental Status: She is alert and oriented to person, place, and time.      Cranial Nerves: No cranial nerve deficit.      Coordination: Coordination normal.     Final pathology showed   Part 1   Lymph nodes (2, submitted as right axillary sentinel node hot 656):   -No evidence of malignancy on H&E or immunohistochemical stains   Part 2   Lymph node (1, submitted as right axillary sentinel node hot 171):   -No evidence of malignancy on H&E or immunohistochemical stains   Part 3   Breast with skin and nipple (total mastectomy, submitted as right breast):   -Ductal carcinoma in situ (DCIS), intermediate nuclear grade, papillary and   cribriform patterns with focal necrosis   -Carcinoma consists of an aggregate of involved ducts microscopically   measuring 21 x 9 mm (2.1 x 0.9 cm) located at 6 o'clock   -Carcinoma is closest to the posterior margin which is 1.3 cm distant.  All   margins, skin, and nipple are free of malignancy.   -Breast biomarkers (based on previous [facility] surgical specimen   [facility]- reported June 9, 2021):        -ER Positive (strong, 90%)        -[place] - Positive (qpek-sx-ghsensowkcar, 20%)   -Uninvolved breast shows prominent fibrocystic changes and focal ductal   epithelial hyperplasia without atypia (UDH)   -AJCC TN: pTis (DCIS) pN0(sn)   -Complete AJCC/CAP synoptic staging form follows:   STAGING FORM for DCIS of the BREAST (CAP/AJCC February 2020 Protocol)   Procedure:  Total mastectomy with skin and nipple   Specimen laterality:  Right   Estimated size (extent) of DCIS is at least:  21 mm   Histologic type: Ductal carcinoma in situ   Nuclear grade: Grade II (intermediate)   Necrosis: Present, focal   Margins: Uninvolved by DCIS        -Distance from closest margin:  12 mm        -Specify closest margin (required only if less than 2 mm):  Posterior   Regional lymph nodes (pN):        -Uninvolved by  tumor cells             -Total Number of Lymph Nodes Examined:  3             -Number of Glady Nodes Examined: 3   Pathologic Stage Classification (pTNM):        -Primary tumor (pT):             -pTis (DCIS)        -Regional lymph nodes (pN):             -Regional Lymph Nodes Modifier: (sn)  (Glady nodes evaluated)             -Category (pN): pN0 (No regional lymph node metastasis identified)   Block for possible molecular studies:  3F     Assessment:       1. Ductal carcinoma in situ (DCIS) of breast, unspecified laterality    2. Osteopenia, unspecified location    3. Essential hypertension          Plan:    1.This is a 66 y.o. female with a stage pTis N0 M0 grade 2 ER + ID + HER2 not reported DCIS of the right breast.  The patient is status post right unilaateral mastectomy and sentinel node biopsy on 9/13/2021.  Previously Discussed adjuvant endocrine therapy in terms of risk reduction and pros versus cons of endocrine therapy.     Following a detailed discussion, pt has elected not to pursue adjuvant endocrine therapy  Plan bone density     2. Osteopenia  Cont Ca and Vit D supp  3. REPEAT BP improved , still elevated  Asymptomatic  Pt with hx of white coat syndrome    F/U 4MOS      All questions posed answered to patient's satisfaction.  Cc: Kaylynn Dickinson MD

## 2022-09-30 ENCOUNTER — PATIENT MESSAGE (OUTPATIENT)
Dept: FAMILY MEDICINE | Facility: CLINIC | Age: 67
End: 2022-09-30
Payer: COMMERCIAL

## 2022-10-01 PROBLEM — M85.80 OSTEOPENIA: Status: ACTIVE | Noted: 2022-10-01

## 2022-10-06 ENCOUNTER — TELEPHONE (OUTPATIENT)
Dept: FAMILY MEDICINE | Facility: CLINIC | Age: 67
End: 2022-10-06
Payer: COMMERCIAL

## 2022-10-06 DIAGNOSIS — M81.0 OSTEOPOROSIS, POSTMENOPAUSAL: ICD-10-CM

## 2022-10-06 NOTE — TELEPHONE ENCOUNTER
Patient states she saw her hematology on 09/27, states she was informed she has been on the (FOSAMAX) for years and was told she shouldn't be on the medication for a long period of time. States the hematology informed her that she couldn't take her off the medication it had to be the provider that prescribed the medication. Patient states she haven't taken the medication this week and didn't have any heartburn, states she has been having upset stomach. Patient states she just wanted to inform provider. Hematology inform her to start taking vitamin D and calcium C medication. Patient states she will go buy that medication. Wants to know if she should stop taking medication.  Please advise.

## 2022-10-06 NOTE — TELEPHONE ENCOUNTER
----- Message from Soha Pablo sent at 10/6/2022  4:23 PM CDT -----  Type: Patient Call Back    Who called:pt     What is the request in detail:pt requesting to discuss medications with the nurse. Call pt     Can the clinic reply by [facility]?    Would the patient rather a call back or a response via [facility]? call    Best call back number:[phone] (home)       Additional Information:

## 2022-10-07 ENCOUNTER — TELEPHONE (OUTPATIENT)
Dept: FAMILY MEDICINE | Facility: CLINIC | Age: 67
End: 2022-10-07
Payer: COMMERCIAL

## 2022-10-07 NOTE — TELEPHONE ENCOUNTER
As per my last clinic note in April of this year: were going to determine further treatment based on BMD which was done in July by Oncology (this is why I did not get the result). I know there were times in the past that she did not take the medication, but likely by now she has taken it for about 5 years total, so I agree with her stopping the medication now and rechecking the BMD in 2 years.  She should continue with adequate calcium (1500 mg daily preferably from food) and vitamin D (2000 units supplement) daily.

## 2022-10-07 NOTE — TELEPHONE ENCOUNTER
----- Message from Manasa Sweeney sent at 10/7/2022  1:33 PM CDT -----  .Type:  Patient Returning Call    Who Called: self     Who Left Message for Patient: Elizabeth     Does the patient know what this is regarding?: No     Would the patient rather a call back or a response via [facility]? Call     Best Call Back Number:.[phone]

## 2022-11-12 ENCOUNTER — HOSPITAL ENCOUNTER (EMERGENCY)
Facility: HOSPITAL | Age: 67
Discharge: HOME | End: 2022-11-12
Attending: EMERGENCY MEDICINE
Payer: COMMERCIAL

## 2022-11-12 VITALS
WEIGHT: 90 LBS | HEART RATE: 93 BPM | BODY MASS INDEX: 15.94 KG/M2 | SYSTOLIC BLOOD PRESSURE: 174 MMHG | TEMPERATURE: 99 F | DIASTOLIC BLOOD PRESSURE: 89 MMHG | RESPIRATION RATE: 18 BRPM | OXYGEN SATURATION: 96 %

## 2022-11-12 DIAGNOSIS — R68.83 SHIVERING: Primary | ICD-10-CM

## 2022-11-12 DIAGNOSIS — I10 HYPERTENSION, UNSPECIFIED TYPE: ICD-10-CM

## 2022-11-12 LAB
ANION GAP SERPL CALC-SCNC: 11 MMOL/L (ref 8–16)
BASOPHILS # BLD AUTO: 0.02 K/UL (ref 0–0.2)
BASOPHILS NFR BLD: 0.4 % (ref 0–1.9)
BUN SERPL-MCNC: 15 MG/DL (ref 8–23)
CALCIUM SERPL-MCNC: 10 MG/DL (ref 8.7–10.5)
CHLORIDE SERPL-SCNC: 101 MMOL/L (ref 95–110)
CO2 SERPL-SCNC: 28 MMOL/L (ref 23–29)
CREAT SERPL-MCNC: 0.7 MG/DL (ref 0.5–1.4)
DIFFERENTIAL METHOD: ABNORMAL
EOSINOPHIL # BLD AUTO: 0 K/UL (ref 0–0.5)
EOSINOPHIL NFR BLD: 0.4 % (ref 0–8)
ERYTHROCYTE [DISTWIDTH] IN BLOOD BY AUTOMATED COUNT: 13.2 % (ref 11.5–14.5)
EST. GFR  (NO RACE VARIABLE): >60 ML/MIN/1.73 M^2
GLUCOSE SERPL-MCNC: 116 MG/DL (ref 70–110)
HCT VFR BLD AUTO: 41.7 % (ref 37–48.5)
HCV AB SERPL QL IA: NORMAL
HGB BLD-MCNC: 12.8 G/DL (ref 12–16)
HIV 1+2 AB+HIV1 P24 AG SERPL QL IA: NORMAL
IMM GRANULOCYTES # BLD AUTO: 0.01 K/UL (ref 0–0.04)
IMM GRANULOCYTES NFR BLD AUTO: 0.2 % (ref 0–0.5)
INFLUENZA A, MOLECULAR: NOT DETECTED
INFLUENZA B, MOLECULAR: NOT DETECTED
LYMPHOCYTES # BLD AUTO: 1.6 K/UL (ref 1–4.8)
LYMPHOCYTES NFR BLD: 30 % (ref 18–48)
MCH RBC QN AUTO: 26.9 PG (ref 27–31)
MCHC RBC AUTO-ENTMCNC: 30.7 G/DL (ref 32–36)
MCV RBC AUTO: 88 FL (ref 82–98)
MONOCYTES # BLD AUTO: 0.4 K/UL (ref 0.3–1)
MONOCYTES NFR BLD: 8.1 % (ref 4–15)
NEUTROPHILS # BLD AUTO: 3.3 K/UL (ref 1.8–7.7)
NEUTROPHILS NFR BLD: 60.9 % (ref 38–73)
NRBC BLD-RTO: 0 /100 WBC
PLATELET # BLD AUTO: 219 K/UL (ref 150–450)
PMV BLD AUTO: 10 FL (ref 9.2–12.9)
POTASSIUM SERPL-SCNC: 4.2 MMOL/L (ref 3.5–5.1)
RBC # BLD AUTO: 4.75 M/UL (ref 4–5.4)
RSV AG BY MOLECULAR METHOD: NOT DETECTED
SARS-COV-2 RNA RESP QL NAA+PROBE: NOT DETECTED
SODIUM SERPL-SCNC: 140 MMOL/L (ref 136–145)
WBC # BLD AUTO: 5.34 K/UL (ref 3.9–12.7)

## 2022-11-12 PROCEDURE — 93005 ELECTROCARDIOGRAM TRACING: CPT

## 2022-11-12 PROCEDURE — 93010 ELECTROCARDIOGRAM REPORT: CPT | Mod: ,,, | Performed by: INTERNAL MEDICINE

## 2022-11-12 PROCEDURE — 80048 BASIC METABOLIC PNL TOTAL CA: CPT | Performed by: EMERGENCY MEDICINE

## 2022-11-12 PROCEDURE — 99284 EMERGENCY DEPT VISIT MOD MDM: CPT

## 2022-11-12 PROCEDURE — 0241U SARS-COV2 (COVID) WITH FLU/RSV BY PCR: CPT | Performed by: EMERGENCY MEDICINE

## 2022-11-12 PROCEDURE — 87389 HIV-1 AG W/HIV-1&-2 AB AG IA: CPT | Performed by: PHYSICIAN ASSISTANT

## 2022-11-12 PROCEDURE — 85025 COMPLETE CBC W/AUTO DIFF WBC: CPT | Performed by: EMERGENCY MEDICINE

## 2022-11-12 PROCEDURE — 86803 HEPATITIS C AB TEST: CPT | Performed by: PHYSICIAN ASSISTANT

## 2022-11-12 PROCEDURE — 99284 EMERGENCY DEPT VISIT MOD MDM: CPT | Mod: CS,,, | Performed by: EMERGENCY MEDICINE

## 2022-11-12 PROCEDURE — 93010 EKG 12-LEAD: ICD-10-PCS | Mod: ,,, | Performed by: INTERNAL MEDICINE

## 2022-11-12 PROCEDURE — 99284 PR EMERGENCY DEPT VISIT,LEVEL IV: ICD-10-PCS | Mod: CS,,, | Performed by: EMERGENCY MEDICINE

## 2022-11-12 NOTE — DISCHARGE INSTRUCTIONS
Be sure to take your blood pressure medications as prescribed.  No emergent cause of your shivering was found today.   Be sure to see your PCP for further evaluation.

## 2022-11-12 NOTE — PROVIDER PROGRESS NOTES - EMERGENCY DEPT.
Encounter Date: 11/12/2022    ED Physician Progress Notes        Physician Note:   AOC @ 0700. Patient presented w/ intermittent chills and shaking since COVID vaccine 2-1/2 weeks ago. Concern for viral infection versus anxiety    Pending studies include viral respiratory panel        Likely disposition is home    Anderson Dominguez    8:21 AM  Patient declined to wait for viral respiratory panel.  Given improvement in symptoms.  Patient elected to leave.  Patient did not want to wait for discharge instructions.

## 2022-11-12 NOTE — ED PROVIDER NOTES
"Encounter Date: 11/12/2022       History     Chief Complaint   Patient presents with    Shivering     Feeling "shivery" x 2.5 weeks since getting covid booster. +intermittent nausea     66 yo W with pmhx HTN, HLD, diverticulosis, anxiety, brain tumor s/p gamma knife presents with chief complaint of shivering. Patient notes that for the past 1-2 weeks she will wake up early in the morning at approximately 4:00 a.m. with shivering.  Shivering sometimes lasts minutes, sometimes hours.  She complains to her  about it every morning but has never seen a physician for it.  Today she got concerned and came to the ER.  She reports that currently they have mostly resolved but are still slightly present.  She gets her temperature checked every day at work and she has never had a fever.  She did have a recent exposure to influenza.  She does report some cough and sinus congestion.  No chest pain, shortness breath, abdominal pain, diarrhea, vomiting, dysuria, urinary frequency, rashes.  Patient does admit to being anxious.  Her anxiety limits the history somewhat and her  had to assist by repeating questions to her. She notes she received her flu vaccine and covid booster prior to these episodes.    Review of patient's allergies indicates:   Allergen Reactions    Ace inhibitors      Other reaction(s): cough    Diltiazem Other (See Comments)     - rash, lip numb, weak    Norvasc [amlodipine]      MUSCLE TWITCHING     Penicillins Hives     Past Medical History:   Diagnosis Date    AR (allergic rhinitis)     Breast cancer     RIGHT    CHF (congestive heart failure)     Diverticulosis     Ear pain, right     Elevated LFTs     borderline - due to OTC herbals - resolved    History of colon polyps     Hyperlipidemia     borderline with high HDL    Hypertension     Mild anxiety     Osteoporosis, postmenopausal     Postmenopausal status     Ringing in ear, right     Underweight     Vitamin D deficiency      Past Surgical " History:   Procedure Laterality Date    AXILLARY NODE DISSECTION Right 2021    Procedure: LYMPHADENECTOMY, AXILLARY;  Surgeon: Kaylynn Dickinson MD;  Location: [facility] OR;  Service: General;  Laterality: Right;  RN PREOP 21---COVID ON 9/10-NEGATIVE--HAS CARDS CLEARANCE---    BREAST BIOPSY Right      SECTION, LOW TRANSVERSE      COLONOSCOPY      COLONOSCOPY N/A 2020    Procedure: COLONOSCOPY;  Surgeon: STEFFANIE Gordon MD;  Location: [facility] ENDO [address]);  Service: Endoscopy;  Laterality: N/A;  COVID test on 20 at [facility] urgent - sm    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    INJECTION FOR SENTINEL NODE IDENTIFICATION Right 2021    Procedure: INJECTION, FOR SENTINEL NODE IDENTIFICATION;  Surgeon: Kaylynn Dickinson MD;  Location: [facility] OR;  Service: General;  Laterality: Right;    MASTECTOMY Right     DCIS    MYOMECTOMY      polyp removal from cervix      SENTINEL LYMPH NODE BIOPSY Right 2021    Procedure: BIOPSY, LYMPH NODE, SENTINEL;  Surgeon: Kaylynn Dickinson MD;  Location: [facility] OR;  Service: General;  Laterality: Right;    UNILATERAL MASTECTOMY Right 2021    Procedure: MASTECTOMY, UNILATERAL;  Surgeon: Kaylynn Dickinson MD;  Location: [facility] OR;  Service: General;  Laterality: Right;  NEED-CONSENT, H/P, ORDERS     Family History   Problem Relation Age of Onset    Hypertension Mother     Hypertension Sister     Diabetes Sister     Hypertension Brother     Heart attack Brother     Breast cancer Maternal Aunt     Colon cancer Neg Hx     Ovarian cancer Neg Hx      Social History     Tobacco Use    Smoking status: Never    Smokeless tobacco: Never   Substance Use Topics    Alcohol use: No    Drug use: No     Review of Systems   Constitutional:  Positive for chills. Negative for fever.   HENT:  Negative for sore throat.    Respiratory:  Negative for shortness of breath.    Cardiovascular:  Negative for chest pain.   Gastrointestinal:  Negative for nausea.    Genitourinary:  Negative for dysuria.   Musculoskeletal:  Negative for back pain.   Skin:  Negative for rash.   Neurological:  Negative for weakness.   Hematological:  Does not bruise/bleed easily.   Psychiatric/Behavioral:  The patient is nervous/anxious.      Physical Exam     Initial Vitals   BP Pulse Resp Temp SpO2   11/12/22 0523 11/12/22 0522 11/12/22 0522 11/12/22 0522 11/12/22 0522   (!) 190/91 100 16 98.4 °F (36.9 °C) 99 %      MAP       --                Physical Exam    Nursing note and vitals reviewed.  Constitutional: She appears well-developed. She is not diaphoretic. No distress.   thin   HENT:   Head: Normocephalic and atraumatic.   Eyes: Right eye exhibits no discharge. Left eye exhibits no discharge. No scleral icterus.   Neck: Neck supple. No JVD present.   Normal range of motion.  Cardiovascular:  Regular rhythm.   Tachycardia present.   Exam reveals no gallop and no friction rub.       No murmur heard.  Pulmonary/Chest: Breath sounds normal. No respiratory distress. She has no wheezes. She has no rhonchi. She has no rales. She exhibits no tenderness.   Abdominal: Abdomen is soft. Bowel sounds are normal. She exhibits no distension and no mass. There is no abdominal tenderness. There is no rebound and no guarding.   Musculoskeletal:         General: No tenderness or edema. Normal range of motion.      Cervical back: Normal range of motion and neck supple.     Neurological: She is alert and oriented to person, place, and time. She has normal strength. No sensory deficit.   Skin: Skin is warm and dry. Capillary refill takes less than 2 seconds.   Psychiatric: Thought content normal. Her mood appears anxious. She is slowed.       ED Course   Procedures  Labs Reviewed   CBC W/ AUTO DIFFERENTIAL - Abnormal; Notable for the following components:       Result Value    MCH 26.9 (*)     MCHC 30.7 (*)     All other components within normal limits   BASIC METABOLIC PANEL - Abnormal; Notable for the  following components:    Glucose 116 (*)     All other components within normal limits   HIV 1 / 2 ANTIBODY   HEPATITIS C ANTIBODY   SARS-COV2 (COVID) WITH FLU/RSV BY PCR     EKG Readings: (Independently Interpreted)   Initial Reading: No STEMI. Rhythm: Normal Sinus Rhythm. Heart Rate: 88. Conduction: RBBB (incomplete). ST Segments: Normal ST Segments. T Waves Flipped: AVL. Axis: Normal.     Imaging Results    None          Medications - No data to display  Medical Decision Making:   History:   I obtained history from: someone other than patient.  Old Medical Records: I decided to obtain old medical records.  Initial Assessment:   68 yo W with pmhx HTN, HLD, diverticulosis, anxiety, brain tumor s/p gamma knife presents with chief complaint of shivering.  Differential Diagnosis:   Electrolyte abnormalities, arrhythmia, anxiety, flu, COVID  Clinical Tests:   Lab Tests: Ordered  Medical Tests: Ordered  ED Management:  No fevers, doubt bacteremia.  Will obtain labs, ECG.    Reassessment:  ECG with unchanged incomplete right bundle-branch block but no arrhythmia.  CBC without leukocytosis or anemia.  BMP unremarkable.  On repeat assessment, patient appears more comfortable.  At this time, my shift is coming to a close and the patient was signed out to incoming MD.  Patient was offered discharge and call results with viral swabs but wishes to stay.  Anticipate discharge.                        Clinical Impression:   Final diagnoses:  [R68.83] Shivering (Primary)  [I10] Hypertension, unspecified type        ED Disposition Condition    Discharge Stable          ED Prescriptions    None       Follow-up Information       Follow up With Specialties Details Why Contact Info    Masha Elizondo MD Internal Medicine, Pediatrics Schedule an appointment as soon as possible for a visit   [address] [place] [address]  Beto GELLER [id number] [phone]      [facility] - Emergency Dept Emergency Medicine  As needed, If symptoms worsen [facility]  [facility] [address]  [phone]             Maulik Palomares MD  11/12/22 0702

## 2022-11-14 ENCOUNTER — TELEPHONE (OUTPATIENT)
Dept: FAMILY MEDICINE | Facility: CLINIC | Age: 67
End: 2022-11-14
Payer: COMMERCIAL

## 2022-11-14 NOTE — TELEPHONE ENCOUNTER
----- Message from Chelsy Torre sent at 11/14/2022  1:58 PM CST -----  ..Type:  Sooner Appointment Request    Patient is requesting a sooner appointment.  Patient declined first available appointment listed as well as another facility and provider .  Patient will not accept being placed on the waitlist and is requesting a message be sent to doctor.    Name of Caller: self    When is the first available appointment? 3/2023    Symptoms: hospital follow up    Would the patient rather a call back or a response via [facility]? Call    Best Call Back Number: .[phone] (home)         Additional Information:

## 2022-11-15 ENCOUNTER — TELEPHONE (OUTPATIENT)
Dept: NEUROSURGERY | Facility: CLINIC | Age: 67
End: 2022-11-15
Payer: COMMERCIAL

## 2022-11-15 DIAGNOSIS — D33.3 ACOUSTIC NEUROMA: Primary | ICD-10-CM

## 2022-11-15 NOTE — TELEPHONE ENCOUNTER
Spoke with patient and confirmed with her order for MRI will be sent to [facility] .   Patient agrees and I have advised her to contact their facility for scheduling. Patient v/u and denies any further questions.         ----- Message from Zak Montes De Oca MA sent at 11/15/2022  3:45 PM CST -----  Regarding: pt advice  Contact: pt   Patient Called to speak  with Someone in Dr Woods office. States that she wanted MRI order sent to [facility] Not [facility] please call

## 2022-11-15 NOTE — TELEPHONE ENCOUNTER
Returned patient call . Unfortunately, patient as not available. Lvm requesting a call back.   Mri order has been signed and faxed to patient preference for location of imaging ( [facility].)               ----- Message from Dora Thakkar sent at 11/15/2022  1:56 PM CST -----  Regarding: Advice  Contact: Self   Pt is schedule  for a f/u on 12/23/22 and will need MRI orders sent to [facility].  Pt says that you all have the fax info.  Please call her after it's been sent.

## 2022-11-22 ENCOUNTER — TELEPHONE (OUTPATIENT)
Dept: NEUROSURGERY | Facility: CLINIC | Age: 67
End: 2022-11-22
Payer: COMMERCIAL

## 2022-11-22 NOTE — TELEPHONE ENCOUNTER
"CB and SW pt, phone connection was very poor and it was difficult to understand the conversation. To the best of my knowledge, pt was concerned about contrast needed for her MRI due to having chills and panic recently. I explained the contrast should not make her "chills" worse. She discussed being anxious about outside factors but was not anxious about the MRI, just wanted to check with office about side effects. Pt happy with call and did not have any further questions at this time.     ----- Message -----  From: Tremontana Chevalier  Sent: 11/21/2022   4:11 PM CST  To: Peggy SHANONN Staff  Subject: pt advice                                        # pt calling regarding upcoming MRI and med that will be injected. Pls cll pt @ [phone]      "

## 2022-11-23 ENCOUNTER — TELEPHONE (OUTPATIENT)
Dept: HEMATOLOGY/ONCOLOGY | Facility: CLINIC | Age: 67
End: 2022-11-23
Payer: COMMERCIAL

## 2022-11-23 NOTE — TELEPHONE ENCOUNTER
What is the nature of the call?:   Patient states that she is experiencing severe shivers for about a month and it is keeping her from sleeping.  Would like to speak with the clinic to discuss.     Tc to pt   No response  Message left oN VM for her tontact her PCP re: this matters as we do not work patient's up for shivers

## 2022-12-09 ENCOUNTER — PATIENT MESSAGE (OUTPATIENT)
Dept: NEUROSURGERY | Facility: CLINIC | Age: 67
End: 2022-12-09
Payer: COMMERCIAL

## 2022-12-15 ENCOUNTER — TELEPHONE (OUTPATIENT)
Dept: NEUROSURGERY | Facility: CLINIC | Age: 67
End: 2022-12-15
Payer: COMMERCIAL

## 2022-12-15 NOTE — TELEPHONE ENCOUNTER
Faxed req to Alex at [facility] for MRI to be [facility] to us

## 2022-12-20 ENCOUNTER — OFFICE VISIT (OUTPATIENT)
Dept: NEUROSURGERY | Facility: CLINIC | Age: 67
End: 2022-12-20
Payer: COMMERCIAL

## 2022-12-20 VITALS
SYSTOLIC BLOOD PRESSURE: 187 MMHG | BODY MASS INDEX: 16.21 KG/M2 | DIASTOLIC BLOOD PRESSURE: 95 MMHG | HEIGHT: 63 IN | WEIGHT: 91.5 LBS | TEMPERATURE: 98 F | HEART RATE: 100 BPM

## 2022-12-20 DIAGNOSIS — D33.3 ACOUSTIC NEUROMA: Primary | ICD-10-CM

## 2022-12-20 PROCEDURE — 1159F MED LIST DOCD IN RCRD: CPT | Mod: CPTII,S$GLB,, | Performed by: NEUROLOGICAL SURGERY

## 2022-12-20 PROCEDURE — 3080F PR MOST RECENT DIASTOLIC BLOOD PRESSURE >= 90 MM HG: ICD-10-PCS | Mod: CPTII,S$GLB,, | Performed by: NEUROLOGICAL SURGERY

## 2022-12-20 PROCEDURE — 3008F BODY MASS INDEX DOCD: CPT | Mod: CPTII,S$GLB,, | Performed by: NEUROLOGICAL SURGERY

## 2022-12-20 PROCEDURE — 4010F ACE/ARB THERAPY RXD/TAKEN: CPT | Mod: CPTII,S$GLB,, | Performed by: NEUROLOGICAL SURGERY

## 2022-12-20 PROCEDURE — 1160F RVW MEDS BY RX/DR IN RCRD: CPT | Mod: CPTII,S$GLB,, | Performed by: NEUROLOGICAL SURGERY

## 2022-12-20 PROCEDURE — 99214 PR OFFICE/OUTPT VISIT, EST, LEVL IV, 30-39 MIN: ICD-10-PCS | Mod: S$GLB,,, | Performed by: NEUROLOGICAL SURGERY

## 2022-12-20 PROCEDURE — 3077F PR MOST RECENT SYSTOLIC BLOOD PRESSURE >= 140 MM HG: ICD-10-PCS | Mod: CPTII,S$GLB,, | Performed by: NEUROLOGICAL SURGERY

## 2022-12-20 PROCEDURE — 99214 OFFICE O/P EST MOD 30 MIN: CPT | Mod: S$GLB,,, | Performed by: NEUROLOGICAL SURGERY

## 2022-12-20 PROCEDURE — 1101F PT FALLS ASSESS-DOCD LE1/YR: CPT | Mod: CPTII,S$GLB,, | Performed by: NEUROLOGICAL SURGERY

## 2022-12-20 PROCEDURE — 1160F PR REVIEW ALL MEDS BY PRESCRIBER/CLIN PHARMACIST DOCUMENTED: ICD-10-PCS | Mod: CPTII,S$GLB,, | Performed by: NEUROLOGICAL SURGERY

## 2022-12-20 PROCEDURE — 4010F PR ACE/ARB THEARPY RXD/TAKEN: ICD-10-PCS | Mod: CPTII,S$GLB,, | Performed by: NEUROLOGICAL SURGERY

## 2022-12-20 PROCEDURE — 3288F FALL RISK ASSESSMENT DOCD: CPT | Mod: CPTII,S$GLB,, | Performed by: NEUROLOGICAL SURGERY

## 2022-12-20 PROCEDURE — 99999 PR PBB SHADOW E&M-EST. PATIENT-LVL III: CPT | Mod: PBBFAC,,, | Performed by: NEUROLOGICAL SURGERY

## 2022-12-20 PROCEDURE — 1159F PR MEDICATION LIST DOCUMENTED IN MEDICAL RECORD: ICD-10-PCS | Mod: CPTII,S$GLB,, | Performed by: NEUROLOGICAL SURGERY

## 2022-12-20 PROCEDURE — 3080F DIAST BP >= 90 MM HG: CPT | Mod: CPTII,S$GLB,, | Performed by: NEUROLOGICAL SURGERY

## 2022-12-20 PROCEDURE — 1125F PR PAIN SEVERITY QUANTIFIED, PAIN PRESENT: ICD-10-PCS | Mod: CPTII,S$GLB,, | Performed by: NEUROLOGICAL SURGERY

## 2022-12-20 PROCEDURE — 1101F PR PT FALLS ASSESS DOC 0-1 FALLS W/OUT INJ PAST YR: ICD-10-PCS | Mod: CPTII,S$GLB,, | Performed by: NEUROLOGICAL SURGERY

## 2022-12-20 PROCEDURE — 3008F PR BODY MASS INDEX (BMI) DOCUMENTED: ICD-10-PCS | Mod: CPTII,S$GLB,, | Performed by: NEUROLOGICAL SURGERY

## 2022-12-20 PROCEDURE — 1125F AMNT PAIN NOTED PAIN PRSNT: CPT | Mod: CPTII,S$GLB,, | Performed by: NEUROLOGICAL SURGERY

## 2022-12-20 PROCEDURE — 99999 PR PBB SHADOW E&M-EST. PATIENT-LVL III: ICD-10-PCS | Mod: PBBFAC,,, | Performed by: NEUROLOGICAL SURGERY

## 2022-12-20 PROCEDURE — 3077F SYST BP >= 140 MM HG: CPT | Mod: CPTII,S$GLB,, | Performed by: NEUROLOGICAL SURGERY

## 2022-12-20 PROCEDURE — 3288F PR FALLS RISK ASSESSMENT DOCUMENTED: ICD-10-PCS | Mod: CPTII,S$GLB,, | Performed by: NEUROLOGICAL SURGERY

## 2022-12-20 NOTE — PROGRESS NOTES
Subjective:   I, Margarita Lui, attest that this documentation has been prepared under the direction and in the presence of Orion Woods MD.     Patient ID: Cathy Reynolds is a 67 y.o. female     Chief Complaint: Follow-up      HPI  MsRenaldo Reynolds is a 67 y.o. woman with an acoustic neuroma, treated with Gamma Knife Radiosurgery on 1/25/2019, who presents today for 1 year follow up with MRI brain. Today the pt reports she remains functional at baseline and is working as normal. Of note, pt reports a recent ED visit last month at which time she was evaluated for persistent tremors. Otherwise, she is doing well in the interim with no new complaints.    Review of Systems   Constitutional:  Negative for activity change, appetite change, fatigue, fever and unexpected weight change.   HENT:  Negative for facial swelling.    Eyes: Negative.    Respiratory: Negative.     Cardiovascular: Negative.    Gastrointestinal:  Negative for diarrhea, nausea and vomiting.   Endocrine: Negative.    Genitourinary: Negative.    Musculoskeletal:  Negative for back pain, joint swelling, myalgias and neck pain.   Neurological:  Negative for dizziness, seizures, weakness, numbness and headaches.   Psychiatric/Behavioral: Negative.        Past Medical History:   Diagnosis Date    AR (allergic rhinitis)     Breast cancer     RIGHT    CHF (congestive heart failure)     Diverticulosis     Ear pain, right     Elevated LFTs     borderline - due to OTC herbals - resolved    History of colon polyps     Hyperlipidemia     borderline with high HDL    Hypertension     Mild anxiety     Osteoporosis, postmenopausal     Postmenopausal status     Ringing in ear, right     Underweight     Vitamin D deficiency        Objective:      Vitals:    12/20/22 1053   BP: (!) 187/95   Pulse: 100   Temp: 97.7 °F (36.5 °C)      Physical Exam  Constitutional:       General: She is not in acute distress.     Appearance: Normal appearance.   HENT:      Head:  Normocephalic and atraumatic.   Pulmonary:      Effort: Pulmonary effort is normal.   Musculoskeletal:      Cervical back: Neck supple.   Neurological:      Mental Status: She is alert and oriented to person, place, and time.      GCS: GCS eye subscore is 4. GCS verbal subscore is 5. GCS motor subscore is 6.      Cranial Nerves: No cranial nerve deficit.     IMAGING:  MRI Previous (11/23/2022):  Right acoustic neuroma that is stable in size. No significant change when compared to prior imaging.    I have personally reviewed the images with the pt.      I, Dr. Orion Woods, personally performed the services described in this documentation. All medical record entries made by the scribe, Margarita Lui, were at my direction and in my presence.  I have reviewed the chart and agree that the record reflects my personal performance and is accurate and complete. Oroin Woods MD. 12/20/2022    Assessment:       Acoustic neuroma.      Plan:   I have personally reviewed the MRI previous with the pt which shows right acoustic neuroma that is stable in size. No significant change when compared to prior imaging.    I will schedule the patient for 1 year follow up with MRI brain.

## 2022-12-20 NOTE — PATIENT INSTRUCTIONS
I have personally reviewed the MRI previous with the pt which shows right acoustic neuroma that is stable in size. No significant change when compared to prior imaging.    I will schedule the patient for 1 year follow up with MRI brain.

## 2023-01-17 ENCOUNTER — TELEPHONE (OUTPATIENT)
Dept: HEMATOLOGY/ONCOLOGY | Facility: CLINIC | Age: 68
End: 2023-01-17
Payer: COMMERCIAL

## 2023-01-18 ENCOUNTER — TELEPHONE (OUTPATIENT)
Dept: HEMATOLOGY/ONCOLOGY | Facility: CLINIC | Age: 68
End: 2023-01-18
Payer: COMMERCIAL

## 2023-01-19 ENCOUNTER — TELEPHONE (OUTPATIENT)
Dept: HEMATOLOGY/ONCOLOGY | Facility: CLINIC | Age: 68
End: 2023-01-19
Payer: COMMERCIAL

## 2023-01-19 ENCOUNTER — TELEPHONE (OUTPATIENT)
Dept: FAMILY MEDICINE | Facility: CLINIC | Age: 68
End: 2023-01-19
Payer: COMMERCIAL

## 2023-01-19 NOTE — TELEPHONE ENCOUNTER
----- Message from Arabella Yo sent at 1/19/2023  8:29 AM CST -----  Regarding: Self/  [phone]  Type: Patient Call Back    Who called:  Patient    What is the request in detail:  Patient tested positive for Covid, patient would like to know if there's something she need to take or just let it run it's course.  Thank you    Would the patient rather a call back or a response via [facility]?  Call back    Best call back number:  [phone]        Thank you

## 2023-01-19 NOTE — TELEPHONE ENCOUNTER
Patient called to push back her appointment because she tested positive for covid. Patient also requested for her labs to be sent at [facility].   17

## 2023-01-19 NOTE — TELEPHONE ENCOUNTER
Patient was informed that e-visit was already sent to her mychart. She needs to complete it per Dr. Elizondo request. RIKI.

## 2023-01-19 NOTE — TELEPHONE ENCOUNTER
Spoke to patient and she sts that she tested positive for covid on 1/18. Her sypmtoms consists of runny nose, scratchy throat and body aches. Patient is asking for advice on what to take for her symptoms. Patient is active on the [facility] patient portal.

## 2023-01-19 NOTE — TELEPHONE ENCOUNTER
----- Message from Manasa Sweeney sent at 1/19/2023 11:25 AM CST -----  Regarding: patient call back  Type: Patient Call Back    Who called: Self     What is the request in detail: She wanting to let the nurse know about her covid symptoms. She forgot to tell her that she's coughing     Can the clinic reply by [facility]? No     Would the patient rather a call back or a response via [facility]? Call     Best call back number: .[phone]

## 2023-02-13 DIAGNOSIS — J30.1 ACUTE SEASONAL ALLERGIC RHINITIS DUE TO POLLEN: ICD-10-CM

## 2023-02-13 RX ORDER — FLUTICASONE PROPIONATE 50 MCG
SPRAY, SUSPENSION (ML) NASAL
Qty: 48 G | OUTPATIENT
Start: 2023-02-13

## 2023-02-13 NOTE — TELEPHONE ENCOUNTER
No new care gaps identified.  [facility] Embedded Care Gaps. Reference number: [id number]. 2/13/2023   1:31:39 PM CST

## 2023-02-14 NOTE — TELEPHONE ENCOUNTER
Left message on voice mail to return call to clinic to set up yearly appointment with Health maintenance.

## 2023-03-08 ENCOUNTER — OFFICE VISIT (OUTPATIENT)
Dept: HEMATOLOGY/ONCOLOGY | Facility: CLINIC | Age: 68
End: 2023-03-08
Payer: COMMERCIAL

## 2023-03-08 VITALS
DIASTOLIC BLOOD PRESSURE: 103 MMHG | BODY MASS INDEX: 16.32 KG/M2 | HEART RATE: 89 BPM | SYSTOLIC BLOOD PRESSURE: 170 MMHG | HEIGHT: 63 IN | WEIGHT: 92.13 LBS

## 2023-03-08 DIAGNOSIS — M85.80 OSTEOPENIA, UNSPECIFIED LOCATION: ICD-10-CM

## 2023-03-08 DIAGNOSIS — D05.10 DUCTAL CARCINOMA IN SITU (DCIS) OF BREAST, UNSPECIFIED LATERALITY: Primary | ICD-10-CM

## 2023-03-08 DIAGNOSIS — I10 ESSENTIAL HYPERTENSION: ICD-10-CM

## 2023-03-08 PROCEDURE — 3008F PR BODY MASS INDEX (BMI) DOCUMENTED: ICD-10-PCS | Mod: CPTII,S$GLB,, | Performed by: INTERNAL MEDICINE

## 2023-03-08 PROCEDURE — 1126F AMNT PAIN NOTED NONE PRSNT: CPT | Mod: CPTII,S$GLB,, | Performed by: INTERNAL MEDICINE

## 2023-03-08 PROCEDURE — 4010F PR ACE/ARB THEARPY RXD/TAKEN: ICD-10-PCS | Mod: CPTII,S$GLB,, | Performed by: INTERNAL MEDICINE

## 2023-03-08 PROCEDURE — 1159F MED LIST DOCD IN RCRD: CPT | Mod: CPTII,S$GLB,, | Performed by: INTERNAL MEDICINE

## 2023-03-08 PROCEDURE — 99214 PR OFFICE/OUTPT VISIT, EST, LEVL IV, 30-39 MIN: ICD-10-PCS | Mod: S$GLB,,, | Performed by: INTERNAL MEDICINE

## 2023-03-08 PROCEDURE — 3080F PR MOST RECENT DIASTOLIC BLOOD PRESSURE >= 90 MM HG: ICD-10-PCS | Mod: CPTII,S$GLB,, | Performed by: INTERNAL MEDICINE

## 2023-03-08 PROCEDURE — 3077F SYST BP >= 140 MM HG: CPT | Mod: CPTII,S$GLB,, | Performed by: INTERNAL MEDICINE

## 2023-03-08 PROCEDURE — 3008F BODY MASS INDEX DOCD: CPT | Mod: CPTII,S$GLB,, | Performed by: INTERNAL MEDICINE

## 2023-03-08 PROCEDURE — 1159F PR MEDICATION LIST DOCUMENTED IN MEDICAL RECORD: ICD-10-PCS | Mod: CPTII,S$GLB,, | Performed by: INTERNAL MEDICINE

## 2023-03-08 PROCEDURE — 3077F PR MOST RECENT SYSTOLIC BLOOD PRESSURE >= 140 MM HG: ICD-10-PCS | Mod: CPTII,S$GLB,, | Performed by: INTERNAL MEDICINE

## 2023-03-08 PROCEDURE — 1126F PR PAIN SEVERITY QUANTIFIED, NO PAIN PRESENT: ICD-10-PCS | Mod: CPTII,S$GLB,, | Performed by: INTERNAL MEDICINE

## 2023-03-08 PROCEDURE — 4010F ACE/ARB THERAPY RXD/TAKEN: CPT | Mod: CPTII,S$GLB,, | Performed by: INTERNAL MEDICINE

## 2023-03-08 PROCEDURE — 3080F DIAST BP >= 90 MM HG: CPT | Mod: CPTII,S$GLB,, | Performed by: INTERNAL MEDICINE

## 2023-03-08 PROCEDURE — 99999 PR PBB SHADOW E&M-EST. PATIENT-LVL III: CPT | Mod: PBBFAC,,, | Performed by: INTERNAL MEDICINE

## 2023-03-08 PROCEDURE — 99214 OFFICE O/P EST MOD 30 MIN: CPT | Mod: S$GLB,,, | Performed by: INTERNAL MEDICINE

## 2023-03-08 PROCEDURE — 99999 PR PBB SHADOW E&M-EST. PATIENT-LVL III: ICD-10-PCS | Mod: PBBFAC,,, | Performed by: INTERNAL MEDICINE

## 2023-03-08 NOTE — PROGRESS NOTES
Subjective:       Patient ID: Cathy Reynolds is a 67 y.o. female.    Chief Complaint: Breast Cancer      Diagnosis: DCIS pTis pN0 rt breast  ERpos FL pos    status post right  mastectomy and SLNB on 20.    HPI: Patient is  a 67 y.o. female  seen today f/u  for Rt breast cancer. She had abnormal screening mammogram 21. Follow-up mammogram () showed Right breast 72 mm calcifications at the posterior 6 o'clock position. A stereotactic biopsy was performed on 21. 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  Patient has not noted a change on breast exam.  Patient denies nipple discharge. Patient denies to previous breast biopsy. 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 Pathology shows DCIS, Grade 2 . Margins uninvolved with distance from 12 mm with pathologic staging pTis pN0. She has declined adjuvant endocrine therapy.    Interval Hx:   She was diagnosed with COVID infection in January  She was asymptomatic   Doing well  Appetite and weight stable  No SOB/CP        GYN History:Age of menarche was 11. Age of menopause was 48.  Last menstrual period was 48. Patient denies hormonal therapy. Patient is . Age of first live birth was 31. Patient did not breast feed.    Fam Hx: Maternal Aunt Breast CA 66     Past Medical History:   Diagnosis Date    AR (allergic rhinitis)     Breast cancer     RIGHT    CHF (congestive heart failure)     Diverticulosis     Ear pain, right     Elevated LFTs     borderline - due to OTC herbals - resolved    History of colon polyps     Hyperlipidemia     borderline with high HDL    Hypertension     Mild anxiety     Osteoporosis,  postmenopausal     Postmenopausal status     Ringing in ear, right     Underweight     Vitamin D deficiency          Past Medical History:   Diagnosis Date    AR (allergic rhinitis)     Breast cancer     RIGHT    CHF (congestive heart failure)     Diverticulosis     Ear pain, right     Elevated LFTs     borderline - due to OTC herbals - resolved    History of colon polyps     Hyperlipidemia     borderline with high HDL    Hypertension     Mild anxiety     Osteoporosis, postmenopausal     Postmenopausal status     Ringing in ear, right     Underweight     Vitamin D deficiency        Past Surgical History:   Procedure Laterality Date    AXILLARY NODE DISSECTION Right 2021    Procedure: LYMPHADENECTOMY, AXILLARY;  Surgeon: Kaylynn Dickinson MD;  Location: [facility] OR;  Service: General;  Laterality: Right;  RN PREOP 21---COVID ON 9/10-NEGATIVE--HAS CARDS CLEARANCE---    BREAST BIOPSY Right      SECTION, LOW TRANSVERSE      COLONOSCOPY      COLONOSCOPY N/A 2020    Procedure: COLONOSCOPY;  Surgeon: STEFFANIE Gordon MD;  Location: [address]);  Service: Endoscopy;  Laterality: N/A;  COVID test on 20 at [facility]    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    INJECTION FOR SENTINEL NODE IDENTIFICATION Right 2021    Procedure: INJECTION, FOR SENTINEL NODE IDENTIFICATION;  Surgeon: Kaylynn Dickinson MD;  Location: [facility] OR;  Service: General;  Laterality: Right;    MASTECTOMY Right     DCIS    MYOMECTOMY      polyp removal from cervix      SENTINEL LYMPH NODE BIOPSY Right 2021    Procedure: BIOPSY, LYMPH NODE, SENTINEL;  Surgeon: Kaylynn Dickinson MD;  Location: [facility] OR;  Service: General;  Laterality: Right;    UNILATERAL MASTECTOMY Right 2021    Procedure: MASTECTOMY, UNILATERAL;  Surgeon: Kaylynn Dickinson MD;  Location: [facility] OR;  Service: General;  Laterality: Right;  NEED-CONSENT, H/P, ORDERS       Review of Systems   Constitutional:  Negative  "for appetite change, fatigue, fever and unexpected weight change.   HENT:  Negative for mouth sores.    Eyes:  Negative for visual disturbance.   Respiratory:  Negative for cough and shortness of breath.    Cardiovascular:  Negative for chest pain.   Gastrointestinal:  Negative for abdominal pain and diarrhea.   Genitourinary:  Negative for frequency.   Musculoskeletal:  Negative for back pain.   Integumentary:  Negative for rash.   Neurological:  Negative for headaches.   Hematological:  Negative for adenopathy.   Psychiatric/Behavioral:  The patient is not nervous/anxious.        Objective:           Vitals:    03/08/23 1445   BP: (!) 170/103   BP Location: Right arm   Patient Position: Sitting   BP Method: Large (Automatic)   Pulse: 89   Weight: 41.8 kg (92 lb 2.4 oz)   Height: 5' 3" (1.6 m)           Physical Exam  Constitutional:       Appearance: She is well-developed.   HENT:      Head: Normocephalic.   Eyes:      General: Lids are normal. No scleral icterus.     Conjunctiva/sclera: Conjunctivae normal.   Neck:      Thyroid: No thyromegaly.   Cardiovascular:      Rate and Rhythm: Normal rate and regular rhythm.      Heart sounds: Normal heart sounds. No murmur heard.  Pulmonary:      Breath sounds: Normal breath sounds. No wheezing or rales.   Chest:   Breasts:     Left: No mass, nipple discharge or skin change.      Comments: Rt chest wall-well-healed incision site  Abdominal:      General: Bowel sounds are normal.      Palpations: Abdomen is soft.      Tenderness: There is no abdominal tenderness. There is no guarding or rebound.   Musculoskeletal:         General: No tenderness. Normal range of motion.      Cervical back: Normal range of motion and neck supple.   Lymphadenopathy:      Cervical: No cervical adenopathy.      Upper Body:      Right upper body: No supraclavicular adenopathy.      Left upper body: No supraclavicular adenopathy.   Skin:     General: Skin is warm and dry.      Findings: No " ecchymosis, erythema, petechiae or rash.   Neurological:      Mental Status: She is alert and oriented to person, place, and time.      Cranial Nerves: No cranial nerve deficit.      Coordination: Coordination normal.     Final pathology showed   Part 1   Lymph nodes (2, submitted as right axillary sentinel node hot 656):   -No evidence of malignancy on H&E or immunohistochemical stains   Part 2   Lymph node (1, submitted as right axillary sentinel node hot 171):   -No evidence of malignancy on H&E or immunohistochemical stains   Part 3   Breast with skin and nipple (total mastectomy, submitted as right breast):   -Ductal carcinoma in situ (DCIS), intermediate nuclear grade, papillary and   cribriform patterns with focal necrosis   -Carcinoma consists of an aggregate of involved ducts microscopically   measuring 21 x 9 mm (2.1 x 0.9 cm) located at 6 o'clock   -Carcinoma is closest to the posterior margin which is 1.3 cm distant.  All   margins, skin, and nipple are free of malignancy.   -Breast biomarkers (based on previous [facility] surgical specimen   WBS- reported June 9, 2021):        -ER Positive (strong, 90%)        -GA - Positive (doks-nk-jrbnsbysplvw, 20%)   -Uninvolved breast shows prominent fibrocystic changes and focal ductal   epithelial hyperplasia without atypia (UDH)   -AJCC TN: pTis (DCIS) pN0(sn)   -Complete AJCC/CAP synoptic staging form follows:   STAGING FORM for DCIS of the BREAST (CAP/AJCC February 2020 Protocol)   Procedure:  Total mastectomy with skin and nipple   Specimen laterality:  Right   Estimated size (extent) of DCIS is at least:  21 mm   Histologic type: Ductal carcinoma in situ   Nuclear grade: Grade II (intermediate)   Necrosis: Present, focal   Margins: Uninvolved by DCIS        -Distance from closest margin:  12 mm        -Specify closest margin (required only if less than 2 mm):  Posterior   Regional lymph nodes (pN):        -Uninvolved by tumor cells              -Total Number of Lymph Nodes Examined:  3             -Number of [place] Nodes Examined: 3   Pathologic Stage Classification (pTNM):        -Primary tumor (pT):             -pTis (DCIS)        -Regional lymph nodes (pN):             -Regional Lymph Nodes Modifier: (sn)  ([place] nodes evaluated)             -Category (pN): pN0 (No regional lymph node metastasis identified)   Block for possible molecular studies:  3F     Assessment:       1. Ductal carcinoma in situ (DCIS) of breast, unspecified laterality    2. Osteopenia, unspecified location    3. Essential hypertension            Plan:    1.This is a 66 y.o. female with a stage pTis N0 M0 grade 2 ER + CT + HER2 not reported DCIS of the right breast.  The patient is status post right unilaateral mastectomy and sentinel node biopsy on 9/13/2021.  Previously Discussed adjuvant endocrine therapy in terms of risk reduction and pros versus cons of endocrine therapy.     Following a detailed discussion, pt has elected not to pursue adjuvant endocrine therapy  [facility] labs reviewed    2. Osteopenia  Cont Ca and Vit D supp    3. Elevated BP  Asymptomatic  Pt with hx of white coat syndrome    F/U 4MOS with cbc,cmp at [facility]      All questions posed answered to patient's satisfaction.  Cc: Kaylynn Dickinson MD

## 2023-04-04 DIAGNOSIS — Z12.31 SCREENING MAMMOGRAM FOR HIGH-RISK PATIENT: Primary | ICD-10-CM

## 2023-04-13 ENCOUNTER — HOSPITAL ENCOUNTER (OUTPATIENT)
Dept: RADIOLOGY | Facility: HOSPITAL | Age: 68
Discharge: HOME | End: 2023-04-13
Attending: SURGERY
Payer: COMMERCIAL

## 2023-04-13 ENCOUNTER — TELEPHONE (OUTPATIENT)
Dept: HEMATOLOGY/ONCOLOGY | Facility: CLINIC | Age: 68
End: 2023-04-13
Payer: COMMERCIAL

## 2023-04-13 DIAGNOSIS — Z12.31 SCREENING MAMMOGRAM FOR HIGH-RISK PATIENT: ICD-10-CM

## 2023-04-13 PROCEDURE — 77063 MAMMO DIGITAL SCREENING LEFT WITH TOMO: ICD-10-PCS | Mod: 26,52,, | Performed by: RADIOLOGY

## 2023-04-13 PROCEDURE — 77067 MAMMO DIGITAL SCREENING LEFT WITH TOMO: ICD-10-PCS | Mod: 26,52,, | Performed by: RADIOLOGY

## 2023-04-13 PROCEDURE — 77063 BREAST TOMOSYNTHESIS BI: CPT | Mod: 26,52,, | Performed by: RADIOLOGY

## 2023-04-13 PROCEDURE — 77067 SCR MAMMO BI INCL CAD: CPT | Mod: 26,52,, | Performed by: RADIOLOGY

## 2023-04-13 PROCEDURE — 77067 SCR MAMMO BI INCL CAD: CPT | Mod: TC,52

## 2023-04-13 NOTE — TELEPHONE ENCOUNTER
----- Message from Jessi Avalos RN sent at 4/11/2023  8:15 AM CDT -----  Can you please assist?    ----- Message -----  From: Pearl Huddleston  Sent: 4/10/2023   8:27 AM CDT  To: Teena Chaidez Staff    .Type: Patient Call Back    Who called:self     What is the request in detail: needs to reschedule mammogram closer to [facility]     Can the clinic reply by [facility]? call    Would the patient rather a call back or a response via [facility]? call    Best call back number: [phone]     Additional Information:

## 2023-05-01 ENCOUNTER — OFFICE VISIT (OUTPATIENT)
Dept: SURGERY | Facility: CLINIC | Age: 68
End: 2023-05-01
Payer: COMMERCIAL

## 2023-05-01 VITALS
HEART RATE: 100 BPM | BODY MASS INDEX: 15.95 KG/M2 | SYSTOLIC BLOOD PRESSURE: 189 MMHG | WEIGHT: 90 LBS | DIASTOLIC BLOOD PRESSURE: 90 MMHG | HEIGHT: 63 IN

## 2023-05-01 DIAGNOSIS — Z86.000 HISTORY OF DUCTAL CARCINOMA IN SITU (DCIS) OF BREAST: Primary | ICD-10-CM

## 2023-05-01 DIAGNOSIS — Z12.31 SCREENING MAMMOGRAM FOR HIGH-RISK PATIENT: ICD-10-CM

## 2023-05-01 PROCEDURE — 3077F SYST BP >= 140 MM HG: CPT | Mod: CPTII,S$GLB,, | Performed by: SURGERY

## 2023-05-01 PROCEDURE — 1159F MED LIST DOCD IN RCRD: CPT | Mod: CPTII,S$GLB,, | Performed by: SURGERY

## 2023-05-01 PROCEDURE — 99999 PR PBB SHADOW E&M-EST. PATIENT-LVL III: CPT | Mod: PBBFAC,,, | Performed by: SURGERY

## 2023-05-01 PROCEDURE — 99213 OFFICE O/P EST LOW 20 MIN: CPT | Mod: S$GLB,,, | Performed by: SURGERY

## 2023-05-01 PROCEDURE — 3080F PR MOST RECENT DIASTOLIC BLOOD PRESSURE >= 90 MM HG: ICD-10-PCS | Mod: CPTII,S$GLB,, | Performed by: SURGERY

## 2023-05-01 PROCEDURE — 99213 PR OFFICE/OUTPT VISIT, EST, LEVL III, 20-29 MIN: ICD-10-PCS | Mod: S$GLB,,, | Performed by: SURGERY

## 2023-05-01 PROCEDURE — 1101F PR PT FALLS ASSESS DOC 0-1 FALLS W/OUT INJ PAST YR: ICD-10-PCS | Mod: CPTII,S$GLB,, | Performed by: SURGERY

## 2023-05-01 PROCEDURE — 1101F PT FALLS ASSESS-DOCD LE1/YR: CPT | Mod: CPTII,S$GLB,, | Performed by: SURGERY

## 2023-05-01 PROCEDURE — 3288F FALL RISK ASSESSMENT DOCD: CPT | Mod: CPTII,S$GLB,, | Performed by: SURGERY

## 2023-05-01 PROCEDURE — 1126F AMNT PAIN NOTED NONE PRSNT: CPT | Mod: CPTII,S$GLB,, | Performed by: SURGERY

## 2023-05-01 PROCEDURE — 3008F PR BODY MASS INDEX (BMI) DOCUMENTED: ICD-10-PCS | Mod: CPTII,S$GLB,, | Performed by: SURGERY

## 2023-05-01 PROCEDURE — 4010F ACE/ARB THERAPY RXD/TAKEN: CPT | Mod: CPTII,S$GLB,, | Performed by: SURGERY

## 2023-05-01 PROCEDURE — 3077F PR MOST RECENT SYSTOLIC BLOOD PRESSURE >= 140 MM HG: ICD-10-PCS | Mod: CPTII,S$GLB,, | Performed by: SURGERY

## 2023-05-01 PROCEDURE — 99999 PR PBB SHADOW E&M-EST. PATIENT-LVL III: ICD-10-PCS | Mod: PBBFAC,,, | Performed by: SURGERY

## 2023-05-01 PROCEDURE — 3008F BODY MASS INDEX DOCD: CPT | Mod: CPTII,S$GLB,, | Performed by: SURGERY

## 2023-05-01 PROCEDURE — 1126F PR PAIN SEVERITY QUANTIFIED, NO PAIN PRESENT: ICD-10-PCS | Mod: CPTII,S$GLB,, | Performed by: SURGERY

## 2023-05-01 PROCEDURE — 3080F DIAST BP >= 90 MM HG: CPT | Mod: CPTII,S$GLB,, | Performed by: SURGERY

## 2023-05-01 PROCEDURE — 4010F PR ACE/ARB THEARPY RXD/TAKEN: ICD-10-PCS | Mod: CPTII,S$GLB,, | Performed by: SURGERY

## 2023-05-01 PROCEDURE — 3288F PR FALLS RISK ASSESSMENT DOCUMENTED: ICD-10-PCS | Mod: CPTII,S$GLB,, | Performed by: SURGERY

## 2023-05-01 PROCEDURE — 1159F PR MEDICATION LIST DOCUMENTED IN MEDICAL RECORD: ICD-10-PCS | Mod: CPTII,S$GLB,, | Performed by: SURGERY

## 2023-05-01 NOTE — PROGRESS NOTES
[facility]                                                                            Surveillance Visit           REFERRING PHYSICIAN:                 Masha Elizondo MD     MEDICAL ONCOLOGIST:                 Dr. Miller  RADIATION ONCOLOGIST:             none       DIAGNOSIS:    This is a 66 y.o. female with a stage pTis N0 M0 grade 2 ER + DC + DCIS of the right breast.        TREATMENT SUMMARY:    The patient is status post right unilateral mastectomy and sentinel node biopsy on 9/13/2021. All lymph nodes negative for malignancy. All margins negative for DCIS.      Final pathology showed   Part 1   Lymph nodes (2, submitted as right axillary sentinel node hot 656):   -No evidence of malignancy on H&E or immunohistochemical stains   Part 2   Lymph node (1, submitted as right axillary sentinel node hot 171):   -No evidence of malignancy on H&E or immunohistochemical stains   Part 3   Breast with skin and nipple (total mastectomy, submitted as right breast):   -Ductal carcinoma in situ (DCIS), intermediate nuclear grade, papillary and   cribriform patterns with focal necrosis   -Carcinoma consists of an aggregate of involved ducts microscopically   measuring 21 x 9 mm (2.1 x 0.9 cm) located at 6 o'clock   -Carcinoma is closest to the posterior margin which is 1.3 cm distant.  All   margins, skin, and nipple are free of malignancy.   -Breast biomarkers (based on previous [facility] surgical specimen   WBS- reported June 9, 2021):        -ER Positive (strong, 90%)        -DC - Positive ([id number]-dk-fbszqveerxjm, 20%)   -Uninvolved breast shows prominent fibrocystic changes and focal ductal   epithelial hyperplasia without atypia (UDH)   -AJCC TN: pTis (DCIS) pN0(sn)   -Complete AJCC/CAP synoptic staging form follows:   STAGING FORM for DCIS of the BREAST (CAP/AJCC February 2020 Protocol)   Procedure:  Total mastectomy with skin and nipple   Specimen laterality:  Right   Estimated size  (extent) of DCIS is at least:  21 mm   Histologic type: Ductal carcinoma in situ   Nuclear grade: Grade II (intermediate)   Necrosis: Present, focal   Margins: Uninvolved by DCIS        -Distance from closest margin:  12 mm        -Specify closest margin (required only if less than 2 mm):  Posterior   Regional lymph nodes (pN):        -Uninvolved by tumor cells             -Total Number of Lymph Nodes Examined:  3             -Number of [place] Nodes Examined: 3   Pathologic Stage Classification (pTNM):        -Primary tumor (pT):             -pTis (DCIS)        -Regional lymph nodes (pN):             -Regional Lymph Nodes Modifier: (sn)  ([place] nodes evaluated)             -Category (pN): pN0 (No regional lymph node metastasis identified)   Block for possible molecular studies:  3F      INTERVAL HISTORY (10/04/21):   Cathy Reynolds comes in for a post-op check.  She denies fever, chills, chest pain or shortness of breath.  Her pain is well controlled.      INTERVAL HISTORY (04/10/22):   Patient presents for surveillance follow up. Mammogram 04/08/22 results pending.   She is doing well with good ROM, no new HA, vision change, bony or back pain, SOB, and has gained a few pounds, which she struggles with.  Overall well and fully recovered.  Declined endocrine therapy    Interval Hx 5/01/2023:  Patient presents for surveillance. Mammogram on 04/2023 - [facility]IRADS 1  She is doing well overall. Denies unwanted weight loss, she is desiring weight gain but is dealing with the death of her brother.   She continues to work as a teacher.  Not on endocrine therapy.      MEDICATIONS:  Current Medications          Current Outpatient Medications   Medication Sig Dispense Refill    alendronate (FOSAMAX) 70 MG tablet Take 1 tablet by mouth once a week 12 tablet 1    cholecalciferol, vitamin D3, (VITAMIN D3) 2,000 unit Cap Take 2 capsules by mouth once daily.         clotrimazole-betamethasone 1-0.05% (LOTRISONE) cream Apply  topically 2 (two) times daily.        co-enzyme Q-10 30 mg capsule Take 30 mg by mouth once daily.        fluticasone propionate (FLONASE) 50 mcg/actuation nasal spray USE 1 SPRAY IN EACH NOSTRIL TWICE A DAY 16 g 2    folic acid/multivit-min/lutein (CENTRUM SILVER ORAL) Take by mouth.        HYDROcodone-acetaminophen (NORCO) 5-325 mg per tablet Take 1 tablet by mouth every 6 (six) hours as needed for Pain. 20 tablet 0    ketoconazole (NIZORAL) 2 % shampoo Apply topically twice a week.        olmesartan (BENICAR) 5 MG Tab Take 1 tablet (5 mg total) by mouth once daily. 90 tablet 3    olopatadine (PATANOL) 0.1 % ophthalmic solution INT 1 GTT ON OU BID   4      No current facility-administered medications for this visit.            ALLERGIES:         Review of patient's allergies indicates:   Allergen Reactions    Ace inhibitors         Other reaction(s): cough    Diltiazem Other (See Comments)       - rash, lip numb, weak    Norvasc [amlodipine]         MUSCLE TWITCHING     Penicillins Hives        PHYSICAL EXAMINATION:   Physical Exam   Constitutional: She appears well-developed.   HENT:   Head: Normocephalic.   Eyes: No scleral icterus.   Neck: No tracheal deviation present.   Cardiovascular:  Normal rate and regular rhythm.            Pulmonary/Chest: Breath sounds normal. No respiratory distress. Left breast exhibits no inverted nipple, no mass, no nipple discharge and no skin change.       Abdominal: Soft. She exhibits no mass. There is no abdominal tenderness.   Musculoskeletal: Lymphadenopathy:      Cervical: No cervical adenopathy.     Neurological: She is alert.   Skin: No rash noted. No erythema.        IMPRESSION:   This is a 66 y.o. female with a stage pTis N0 M0 grade 2 ER + [place] + DCIS of the right breast.     PLAN:   1. return in 1 year for a follow up office visit and breast exam  2. MMG 4/2024  3. The patient is advised in continued exam of the breast chest wall and to report to this office sooner  should she note any areas of abnormality or concern.   4. Last seen by Dr. Miller on 3/08/23-. She has declined adjuvant endocrine therapy.      25 minutes were spent on this encounter, 15 of which was face to face counseling and 10 minutes were spent on chart review and coordination of care.

## 2023-05-27 DIAGNOSIS — J30.1 ACUTE SEASONAL ALLERGIC RHINITIS DUE TO POLLEN: ICD-10-CM

## 2023-05-27 NOTE — TELEPHONE ENCOUNTER
Care Due:                  Date            Visit Type   Department     Provider  --------------------------------------------------------------------------------                                EP [facility] FAMILY                              PRIMARY      MED/ INTERNAL  Anirudh Mendez  Last Visit: 04-      CARE ([facility])   MED/ PEDS      Thong Elizondo  Next Visit: None Scheduled  None         None Found                                                            Last  Test          Frequency    Reason                     Performed    Due Date  --------------------------------------------------------------------------------    Office Visit  12 months..  olmesartan...............  04- 04-    Health [facility] Embedded Care Due Messages. Reference number: [id number].   5/27/2023 4:30:03 AM CDT

## 2023-05-29 RX ORDER — FLUTICASONE PROPIONATE 50 MCG
SPRAY, SUSPENSION (ML) NASAL
Qty: 16 G | Refills: 0 | Status: SHIPPED | OUTPATIENT
Start: 2023-05-29

## 2023-05-30 DIAGNOSIS — J30.1 ACUTE SEASONAL ALLERGIC RHINITIS DUE TO POLLEN: ICD-10-CM

## 2023-05-30 RX ORDER — FLUTICASONE PROPIONATE 50 MCG
SPRAY, SUSPENSION (ML) NASAL
Qty: 48 G | OUTPATIENT
Start: 2023-05-30

## 2023-05-30 NOTE — TELEPHONE ENCOUNTER
No care due was identified.  Health [facility] Embedded Care Due Messages. Reference number: [id number].   5/30/2023 7:15:26 AM CDT

## 2023-06-19 ENCOUNTER — OFFICE VISIT (OUTPATIENT)
Dept: PRIMARY CARE CLINIC | Facility: CLINIC | Age: 68
End: 2023-06-19
Payer: COMMERCIAL

## 2023-06-19 VITALS
DIASTOLIC BLOOD PRESSURE: 79 MMHG | HEIGHT: 63 IN | WEIGHT: 91.5 LBS | RESPIRATION RATE: 20 BRPM | SYSTOLIC BLOOD PRESSURE: 171 MMHG | HEART RATE: 89 BPM | BODY MASS INDEX: 16.21 KG/M2

## 2023-06-19 DIAGNOSIS — F41.9 ANXIETY: ICD-10-CM
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
## 2023-06-19 DIAGNOSIS — Q67.0 FACIAL ASYMMETRY: ICD-10-CM

## 2023-06-19 DIAGNOSIS — Z86.018 HISTORY OF ACOUSTIC NEUROMA: ICD-10-CM

## 2023-06-19 DIAGNOSIS — I10 ESSENTIAL HYPERTENSION: ICD-10-CM

## 2023-06-19 DIAGNOSIS — M54.2 NECK PAIN: ICD-10-CM

## 2023-06-19 DIAGNOSIS — Z00.00 WELL ADULT EXAM: Primary | ICD-10-CM

## 2023-06-19 PROBLEM — D33.3 ACOUSTIC NEUROMA: Status: RESOLVED | Noted: 2022-03-29 | Resolved: 2023-06-19

## 2023-06-19 PROCEDURE — 3288F FALL RISK ASSESSMENT DOCD: CPT | Mod: CPTII,S$GLB,, | Performed by: FAMILY MEDICINE

## 2023-06-19 PROCEDURE — 3078F PR MOST RECENT DIASTOLIC BLOOD PRESSURE < 80 MM HG: ICD-10-PCS | Mod: CPTII,S$GLB,, | Performed by: FAMILY MEDICINE

## 2023-06-19 PROCEDURE — 1101F PT FALLS ASSESS-DOCD LE1/YR: CPT | Mod: CPTII,S$GLB,, | Performed by: FAMILY MEDICINE

## 2023-06-19 PROCEDURE — 3078F DIAST BP <80 MM HG: CPT | Mod: CPTII,S$GLB,, | Performed by: FAMILY MEDICINE

## 2023-06-19 PROCEDURE — 99397 PER PM REEVAL EST PAT 65+ YR: CPT | Mod: S$GLB,,, | Performed by: FAMILY MEDICINE

## 2023-06-19 PROCEDURE — 4010F PR ACE/ARB THEARPY RXD/TAKEN: ICD-10-PCS | Mod: CPTII,S$GLB,, | Performed by: FAMILY MEDICINE

## 2023-06-19 PROCEDURE — 1126F PR PAIN SEVERITY QUANTIFIED, NO PAIN PRESENT: ICD-10-PCS | Mod: CPTII,S$GLB,, | Performed by: FAMILY MEDICINE

## 2023-06-19 PROCEDURE — 1160F RVW MEDS BY RX/DR IN RCRD: CPT | Mod: CPTII,S$GLB,, | Performed by: FAMILY MEDICINE

## 2023-06-19 PROCEDURE — 1126F AMNT PAIN NOTED NONE PRSNT: CPT | Mod: CPTII,S$GLB,, | Performed by: FAMILY MEDICINE

## 2023-06-19 PROCEDURE — 99397 PR PREVENTIVE VISIT,EST,65 & OVER: ICD-10-PCS | Mod: S$GLB,,, | Performed by: FAMILY MEDICINE

## 2023-06-19 PROCEDURE — 3008F PR BODY MASS INDEX (BMI) DOCUMENTED: ICD-10-PCS | Mod: CPTII,S$GLB,, | Performed by: FAMILY MEDICINE

## 2023-06-19 PROCEDURE — 1159F MED LIST DOCD IN RCRD: CPT | Mod: CPTII,S$GLB,, | Performed by: FAMILY MEDICINE

## 2023-06-19 PROCEDURE — 3077F SYST BP >= 140 MM HG: CPT | Mod: CPTII,S$GLB,, | Performed by: FAMILY MEDICINE

## 2023-06-19 PROCEDURE — 1160F PR REVIEW ALL MEDS BY PRESCRIBER/CLIN PHARMACIST DOCUMENTED: ICD-10-PCS | Mod: CPTII,S$GLB,, | Performed by: FAMILY MEDICINE

## 2023-06-19 PROCEDURE — 3008F BODY MASS INDEX DOCD: CPT | Mod: CPTII,S$GLB,, | Performed by: FAMILY MEDICINE

## 2023-06-19 PROCEDURE — 3077F PR MOST RECENT SYSTOLIC BLOOD PRESSURE >= 140 MM HG: ICD-10-PCS | Mod: CPTII,S$GLB,, | Performed by: FAMILY MEDICINE

## 2023-06-19 PROCEDURE — 3288F PR FALLS RISK ASSESSMENT DOCUMENTED: ICD-10-PCS | Mod: CPTII,S$GLB,, | Performed by: FAMILY MEDICINE

## 2023-06-19 PROCEDURE — 1101F PR PT FALLS ASSESS DOC 0-1 FALLS W/OUT INJ PAST YR: ICD-10-PCS | Mod: CPTII,S$GLB,, | Performed by: FAMILY MEDICINE

## 2023-06-19 PROCEDURE — 1159F PR MEDICATION LIST DOCUMENTED IN MEDICAL RECORD: ICD-10-PCS | Mod: CPTII,S$GLB,, | Performed by: FAMILY MEDICINE

## 2023-06-19 PROCEDURE — 4010F ACE/ARB THERAPY RXD/TAKEN: CPT | Mod: CPTII,S$GLB,, | Performed by: FAMILY MEDICINE

## 2023-06-19 PROCEDURE — 99999 PR PBB SHADOW E&M-EST. PATIENT-LVL IV: CPT | Mod: PBBFAC,,, | Performed by: FAMILY MEDICINE

## 2023-06-19 PROCEDURE — 99999 PR PBB SHADOW E&M-EST. PATIENT-LVL IV: ICD-10-PCS | Mod: PBBFAC,,, | Performed by: FAMILY MEDICINE

## 2023-06-19 RX ORDER — OLMESARTAN MEDOXOMIL 5 MG/1
5 TABLET ORAL DAILY
Qty: 90 TABLET | Refills: 1 | Status: SHIPPED | OUTPATIENT
Start: 2023-06-19 | End: 2024-01-08

## 2023-06-19 RX ORDER — HYDROXYZINE PAMOATE 25 MG/1
25 CAPSULE ORAL EVERY 8 HOURS PRN
Qty: 30 CAPSULE | Refills: 0 | Status: SHIPPED | OUTPATIENT
Start: 2023-06-19 | End: 2023-11-08
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
## 2023-06-20 ENCOUNTER — HOSPITAL ENCOUNTER (OUTPATIENT)
Dept: RADIOLOGY | Facility: HOSPITAL | Age: 68
Discharge: HOME | End: 2023-06-20
Attending: FAMILY MEDICINE
Payer: COMMERCIAL

## 2023-06-20 DIAGNOSIS — Q67.0 FACIAL ASYMMETRY: ICD-10-CM

## 2023-06-20 PROCEDURE — 70160 XR NASAL BONES: ICD-10-PCS | Mod: 26,,, | Performed by: RADIOLOGY

## 2023-06-20 PROCEDURE — 70160 X-RAY EXAM OF NASAL BONES: CPT | Mod: TC,PN

## 2023-06-20 PROCEDURE — 70160 X-RAY EXAM OF NASAL BONES: CPT | Mod: 26,,, | Performed by: RADIOLOGY

## 2023-06-27 ENCOUNTER — TELEPHONE (OUTPATIENT)
Dept: HEMATOLOGY/ONCOLOGY | Facility: CLINIC | Age: 68
End: 2023-06-27
Payer: COMMERCIAL

## 2023-06-27 NOTE — TELEPHONE ENCOUNTER
----- Message from Jessi Avalos RN sent at 6/26/2023  5:07 PM CDT -----  Regarding: request new medical oncologist  Hey   This patient is not wanting to go the [facility] anymore. She is wanting to transition care to a [facility] or [facility] [place] medical oncologist.    Can you assist?     Thank you,   Reena

## 2023-06-27 NOTE — TELEPHONE ENCOUNTER
Patient returned my call, we discussed options for medical oncology at [facility]. Scheduled with Dr. Dc for Monday 7/10 per patient request. Reviewed location of appt, patient verbalized understanding of all information

## 2023-06-27 NOTE — TELEPHONE ENCOUNTER
Called patient to discuss her request to transfer medical oncologists. MARY on mobile, spoke with family member on home number who states she is at work but he will relay message to call me back when she gets off.

## 2023-07-10 ENCOUNTER — OFFICE VISIT (OUTPATIENT)
Dept: HEMATOLOGY/ONCOLOGY | Facility: CLINIC | Age: 68
End: 2023-07-10
Payer: COMMERCIAL

## 2023-07-10 VITALS
WEIGHT: 91.69 LBS | HEART RATE: 88 BPM | SYSTOLIC BLOOD PRESSURE: 160 MMHG | OXYGEN SATURATION: 100 % | RESPIRATION RATE: 18 BRPM | HEIGHT: 63 IN | DIASTOLIC BLOOD PRESSURE: 92 MMHG | BODY MASS INDEX: 16.25 KG/M2

## 2023-07-10 DIAGNOSIS — D05.10 DUCTAL CARCINOMA IN SITU (DCIS) OF BREAST, UNSPECIFIED LATERALITY: Primary | ICD-10-CM

## 2023-07-10 DIAGNOSIS — I10 ESSENTIAL HYPERTENSION: ICD-10-CM

## 2023-07-10 PROCEDURE — 99999 PR PBB SHADOW E&M-EST. PATIENT-LVL III: CPT | Mod: PBBFAC,,, | Performed by: INTERNAL MEDICINE

## 2023-07-10 PROCEDURE — 99214 PR OFFICE/OUTPT VISIT, EST, LEVL IV, 30-39 MIN: ICD-10-PCS | Mod: S$GLB,,, | Performed by: INTERNAL MEDICINE

## 2023-07-10 PROCEDURE — 3008F BODY MASS INDEX DOCD: CPT | Mod: CPTII,S$GLB,, | Performed by: INTERNAL MEDICINE

## 2023-07-10 PROCEDURE — 99214 OFFICE O/P EST MOD 30 MIN: CPT | Mod: S$GLB,,, | Performed by: INTERNAL MEDICINE

## 2023-07-10 PROCEDURE — 4010F PR ACE/ARB THEARPY RXD/TAKEN: ICD-10-PCS | Mod: CPTII,S$GLB,, | Performed by: INTERNAL MEDICINE

## 2023-07-10 PROCEDURE — 4010F ACE/ARB THERAPY RXD/TAKEN: CPT | Mod: CPTII,S$GLB,, | Performed by: INTERNAL MEDICINE

## 2023-07-10 PROCEDURE — 3288F FALL RISK ASSESSMENT DOCD: CPT | Mod: CPTII,S$GLB,, | Performed by: INTERNAL MEDICINE

## 2023-07-10 PROCEDURE — 1126F AMNT PAIN NOTED NONE PRSNT: CPT | Mod: CPTII,S$GLB,, | Performed by: INTERNAL MEDICINE

## 2023-07-10 PROCEDURE — 3077F SYST BP >= 140 MM HG: CPT | Mod: CPTII,S$GLB,, | Performed by: INTERNAL MEDICINE

## 2023-07-10 PROCEDURE — 1159F MED LIST DOCD IN RCRD: CPT | Mod: CPTII,S$GLB,, | Performed by: INTERNAL MEDICINE

## 2023-07-10 PROCEDURE — 3080F PR MOST RECENT DIASTOLIC BLOOD PRESSURE >= 90 MM HG: ICD-10-PCS | Mod: CPTII,S$GLB,, | Performed by: INTERNAL MEDICINE

## 2023-07-10 PROCEDURE — 3080F DIAST BP >= 90 MM HG: CPT | Mod: CPTII,S$GLB,, | Performed by: INTERNAL MEDICINE

## 2023-07-10 PROCEDURE — 1159F PR MEDICATION LIST DOCUMENTED IN MEDICAL RECORD: ICD-10-PCS | Mod: CPTII,S$GLB,, | Performed by: INTERNAL MEDICINE

## 2023-07-10 PROCEDURE — 3008F PR BODY MASS INDEX (BMI) DOCUMENTED: ICD-10-PCS | Mod: CPTII,S$GLB,, | Performed by: INTERNAL MEDICINE

## 2023-07-10 PROCEDURE — 1126F PR PAIN SEVERITY QUANTIFIED, NO PAIN PRESENT: ICD-10-PCS | Mod: CPTII,S$GLB,, | Performed by: INTERNAL MEDICINE

## 2023-07-10 PROCEDURE — 3077F PR MOST RECENT SYSTOLIC BLOOD PRESSURE >= 140 MM HG: ICD-10-PCS | Mod: CPTII,S$GLB,, | Performed by: INTERNAL MEDICINE

## 2023-07-10 PROCEDURE — 1101F PT FALLS ASSESS-DOCD LE1/YR: CPT | Mod: CPTII,S$GLB,, | Performed by: INTERNAL MEDICINE

## 2023-07-10 PROCEDURE — 1101F PR PT FALLS ASSESS DOC 0-1 FALLS W/OUT INJ PAST YR: ICD-10-PCS | Mod: CPTII,S$GLB,, | Performed by: INTERNAL MEDICINE

## 2023-07-10 PROCEDURE — 99999 PR PBB SHADOW E&M-EST. PATIENT-LVL III: ICD-10-PCS | Mod: PBBFAC,,, | Performed by: INTERNAL MEDICINE

## 2023-07-10 PROCEDURE — 3288F PR FALLS RISK ASSESSMENT DOCUMENTED: ICD-10-PCS | Mod: CPTII,S$GLB,, | Performed by: INTERNAL MEDICINE

## 2023-07-10 NOTE — PROGRESS NOTES
[facility]/ [facility] Note:      Chief Complaint:   Encounter Diagnoses   Name Primary?    Ductal carcinoma in situ (DCIS) of breast, unspecified laterality Yes    Essential hypertension         Cancer Staging   No matching staging information was found for the patient.    HPI:  Cathy Reynolds is a 68 y.o. female who presents today for transfer of care from [facility]. She was previously seen by Dr Miller but does not want to drive across the [place]. She was diagnosed with DCIS in 2020 and has been on observation    Oncology History   Abnormal screening mammogram 21   Follow-up mammogram (21) showed Right breast 72 mm calcifications at the posterior 6 o'clock position.   Stereotactic biopsy 21: DCIS, grade 2, ER 90%/ HI 30%    Right mastectomy and SLNB on 21: DCIS, grade 2; 0/1 LN+   Discussion of adjuvant endocrine therapy for contralateral ppx, but patient opted against      GYN History:  Age of menarche was 11.   Age of menopause was 48.  Last menstrual period was 48. Patient denies hormonal therapy.   Patient is . Age of first live birth was 31. Patient did not breast feed.      Social History     Tobacco Use    Smoking status: Never    Smokeless tobacco: Never   Substance Use Topics    Alcohol use: No    Drug use: No     Family History   Problem Relation Age of Onset    Hypertension Mother     Hypertension Sister     Diabetes Sister     Hypertension Brother     Heart attack Brother     Breast cancer Maternal Aunt     Colon cancer Neg Hx     Ovarian cancer Neg Hx      Past Medical History:   Diagnosis Date    AR (allergic rhinitis)     Breast cancer     RIGHT    CHF (congestive heart failure)     Diverticulosis     Ear pain, right     Elevated LFTs     borderline - due to OTC herbals - resolved    History of colon polyps     Hyperlipidemia     borderline with high HDL    Hypertension     Mild anxiety      Osteoporosis, postmenopausal     Postmenopausal status     Ringing in ear, right     Underweight     Vitamin D deficiency      Past Surgical History:   Procedure Laterality Date    AXILLARY NODE DISSECTION Right 2021    Procedure: LYMPHADENECTOMY, AXILLARY;  Surgeon: Kaylynn Dickinson MD;  Location: [facility] OR;  Service: General;  Laterality: Right;  RN PREOP 21---COVID ON 9/10-NEGATIVE--HAS CARDS CLEARANCE---    BREAST BIOPSY Right      SECTION, LOW TRANSVERSE      COLONOSCOPY      COLONOSCOPY N/A 2020    Procedure: COLONOSCOPY;  Surgeon: STEFFANIE Gordon MD;  Location: [facility] ENDO [address]);  Service: Endoscopy;  Laterality: N/A;  COVID test on 20 at [facility] urgent -     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    INJECTION FOR SENTINEL NODE IDENTIFICATION Right 2021    Procedure: INJECTION, FOR SENTINEL NODE IDENTIFICATION;  Surgeon: Kaylynn Dickinson MD;  Location: [facility] OR;  Service: General;  Laterality: Right;    MASTECTOMY Right     DCIS    MYOMECTOMY      polyp removal from cervix      SENTINEL LYMPH NODE BIOPSY Right 2021    Procedure: BIOPSY, LYMPH NODE, SENTINEL;  Surgeon: Kaylynn Dickinson MD;  Location: [facility] OR;  Service: General;  Laterality: Right;    UNILATERAL MASTECTOMY Right 2021    Procedure: MASTECTOMY, UNILATERAL;  Surgeon: Kaylynn Dickinson MD;  Location: [facility] OR;  Service: General;  Laterality: Right;  NEED-CONSENT, H/P, ORDERS       Patient Active Problem List   Diagnosis    Osteoporosis, postmenopausal    Postmenopausal status    Diverticulosis    Mild anxiety    Underweight    AR (allergic rhinitis)    Essential hypertension    Vitamin D deficiency    H/O brain tumor    History of ductal carcinoma in situ (DCIS) of breast    Osteopenia    History of acoustic neuroma       Current Outpatient Medications   Medication Instructions    cholecalciferol, vitamin D3, (VITAMIN D3) 50 mcg (2,000 unit) Cap 2 capsules, Oral, Daily     "clotrimazole-betamethasone 1-0.05% (LOTRISONE) cream Topical (Top), 2 times daily    fluticasone propionate (FLONASE) 50 mcg/actuation nasal spray SHAKE LIQUID AND USE 1 SPRAY IN EACH NOSTRIL TWICE DAILY    folic acid/multivit-min/lutein (CENTRUM SILVER ORAL) Oral    hydrOXYzine pamoate (VISTARIL) 25 mg, Oral, Every 8 hours PRN    olmesartan (BENICAR) 5 mg, Oral, Daily       Review of Systems:   Review of Systems   Constitutional: Negative.    HENT: Negative.     Respiratory: Negative.     Cardiovascular: Negative.    Gastrointestinal: Negative.    Musculoskeletal: Negative.    All other systems reviewed and are negative.    PHYSICAL EXAM:  BP (!) 160/92   Pulse 88   Resp 18   Ht 5' 3" (1.6 m)   Wt 41.6 kg (91 lb 11.4 oz)   SpO2 100%   BMI 16.25 kg/m²     General Appearance:    Alert, cooperative, no distress, appears stated age   Lungs:     Clear to auscultation bilaterally, respirations unlabored    Heart:    Regular rate and rhythm, S1 and S2 normal   Breast Exam:    S/p right mastectomy, no chest wall mass or nodularity. Left breast without mass, nodule or skin changes. Nipple without inversion. No axillary or supraclavicular adenopathy.   Abdomen:     Soft, non-tender, bowel sounds active, no masses, no organomegaly   Extremities:   Extremities normal, atraumatic, no cyanosis or edema   Pulses:   2+ and symmetric all extremities   Skin:   Skin color, texture, turgor normal, no rashes or lesions   Lymph nodes:   Cervical, supraclavicular, and axillary nodes normal   Neurologic:   CNII-XII intact, normal strength, sensation and reflexes     throughout           Pertinent Labs & Imaging:  Specimen (730h ago, onward)      None            No results found for this or any previous visit (from the past 24 hour(s)).    Assessment & Plan:    1. Ductal carcinoma in situ (DCIS) of breast, unspecified laterality    2. Essential hypertension      Reviewed patients referring notes, imaging and pathology. Discussed " diagnosis, staging, and treatment in detail with patient.   Patient with history of DCIS s/p R mastectomy Sept 2021  Currently on observation  Will plan for 6 mos follow up  L breast mammogram April 2023; repeat April 2024    Per NCCN Guidelines, breast cancer patients should be followed every 3-12 months for the first five years and then annually thereafter with annual mammography if mastectomy or breast reconstruction was not undertaken. At this time, there is no indication for routine laboratory or imaging in the surveillance for metastatic disease, unless clinical signs or symptoms arise.    Healthy diet, low alcohol intake, and an active lifestyle, with a goal of an ideal BMI (20-25) is also encouraged to reduce the risk of breast cancer occurrences and recurrences, as well as improve side effects to anti-estrogen medications      Route Chart for Scheduling    Med Onc Chart Routing      Follow up with physician 6 months.   Follow up with VANDANA    Infusion scheduling note    Injection scheduling note    Labs None   Scheduling:  Preferred lab:  Lab interval:     Imaging None      Pharmacy appointment    Other referrals                 Total time of this visit, including time spent face to face with patient and/or via video/audio, and also in preparing for today's visit for MDM and documentation. (Medical Decision Making, including consideration of possible diagnoses, management options, complex medical record review, review of diagnostic tests and information, consideration and discussion of significant complications based on comorbidities, and discussion with providers involved with the care of the patient) 45 minutes. Greater than 50% was spent face to face with the patient counseling and coordinating care.          Jaja Dc MD   07/10/2023

## 2023-08-01 ENCOUNTER — TELEPHONE (OUTPATIENT)
Dept: OTOLARYNGOLOGY | Facility: CLINIC | Age: 68
End: 2023-08-01
Payer: COMMERCIAL

## 2023-08-01 NOTE — TELEPHONE ENCOUNTER
----- Message from Gretta Obrien sent at 7/28/2023  9:32 AM CDT -----  Type: Patient Call Back    Who called:Self     What is the request in detail: Asking to see provider states she seen her back in 2018     Can the clinic reply by [facility]? No     Would the patient rather a call back or a response via [facility]? Call     Best call back number: [phone] (cell)

## 2023-08-10 ENCOUNTER — TELEPHONE (OUTPATIENT)
Dept: OTOLARYNGOLOGY | Facility: CLINIC | Age: 68
End: 2023-08-10
Payer: COMMERCIAL

## 2023-08-10 NOTE — TELEPHONE ENCOUNTER
Spoke with patient hasn't seen ENT since 2018  but having a buzzing in ears / patient stated it may be caused by her stress level / told it is protocol to have hearing tested for that so agreed and put her on for 10/25 at 3:30pm and 4:00pm for Segundo Karimi and Edouard / also put a letter in mail with date and times

## 2023-09-15 NOTE — PROGRESS NOTES
PHYSICAL / OCCUPATIONAL THERAPY - DAILY TREATMENT NOTE (updated )    Patient Name: Ron Kemp    Date: 9/15/2023    : 1964  Insurance: Payor: Brad Armstronge / Plan: Clare S. Gloria / Product Type: *No Product type* /      Patient  verified yes     Visit #   Current / Total 4 18   Time   In / Out 9:21 10:10   Pain   In / Out 4 4   Subjective Functional Status/Changes: Pt states she is feeling sore today. Next PN/ RC due 10/8/2023  Auth due JONATHAN, med Lina Comp, 2023    TREATMENT AREA =  Pain in right knee [M25.561]    OBJECTIVE    Vasopneumatic Device (UNTIMED), press/temp: MED/32 DEGREES    Involved limb - Pre-Treatment Girth: 48.5 cm   Post-Treatment Girth:47.8 cm  Kouts: superior patella  -Pt supine with LE wedge   Min Rationale   10 decrease edema, decrease inflammation, and decrease pain to improve patient's ability to progress to PLOF and address remaining functional goals. Skin assessment post-treatment:   Intact        Therapeutic Procedures: Tx Min Billable or 1:1 Min (if diff from Tx Min) Procedure, Rationale, Specifics   17  85666 Therapeutic Exercise (timed):  increase ROM, strength, coordination, balance, and proprioception to improve patient's ability to progress to PLOF and address remaining functional goals. (see flow sheet as applicable)     Details if applicable:       12  31744 Therapeutic Activity (timed):  use of dynamic activities replicating functional movements to increase ROM, strength, coordination, balance, and proprioception in order to improve patient's ability to progress to PLOF and address remaining functional goals.   (see flow sheet as applicable)     Details if applicable:     10  51481 Neuromuscular Re-Education (timed):  improve balance, coordination, kinesthetic sense, posture, core stability and proprioception to improve patient's ability to develop conscious control of individual muscles and awareness of position of extremities in order to Subjective:       Patient ID: Cathy Reynolds is a 62 y.o. female.    Chief Complaint: Cyst (Right arm   6 days)    62-year-old female presents to the clinic today with complaint of an abscess to right axilla for the past 6 days.  She states that she knows it's from shaving.  She denies any fever or chills.  She denies any headaches, dizziness, or blurred vision.  She has a follow-up blood pressure checkup already scheduled.       Past Medical History:   Diagnosis Date    AR (allergic rhinitis)     Diverticulosis     Elevated LFTs     borderline - due to OTC herbals - resolved    History of colon polyps     Hyperlipidemia     borderline with high HDL    Hypertension     Mild anxiety     Osteoporosis, postmenopausal     Postmenopausal status     Underweight     Vitamin D deficiency      Past Surgical History:   Procedure Laterality Date     SECTION, LOW TRANSVERSE      COLONOSCOPY      MYOMECTOMY      polyp removal from cervix        reports that she has never smoked. She has never used smokeless tobacco. She reports that she does not drink alcohol or use drugs.  Review of Systems   Respiratory: Negative for cough, shortness of breath and wheezing.    Cardiovascular: Negative for chest pain, palpitations and leg swelling.   Gastrointestinal: Negative for abdominal pain, blood in stool, constipation, diarrhea, nausea and vomiting.   Musculoskeletal: Negative for gait problem.   Skin: Negative for rash.        Abscess right axilla    Neurological: Negative for dizziness, light-headedness and headaches.       Objective:      Physical Exam   Constitutional: She is oriented to person, place, and time. She appears well-developed and well-nourished. No distress.   Eyes: Conjunctivae and EOM are normal. Pupils are equal, round, and reactive to light. Right eye exhibits no discharge. Left eye exhibits no discharge. No scleral icterus.   Neck: Normal range of motion. Neck supple. No JVD present.    Cardiovascular: Normal rate, regular rhythm and normal heart sounds.    No murmur heard.  Pulmonary/Chest: Effort normal and breath sounds normal. No respiratory distress. She has no wheezes. She has no rales.   Abdominal: Soft. Bowel sounds are normal. There is no tenderness.   Musculoskeletal: Normal range of motion. She exhibits no edema.   Neurological: She is alert and oriented to person, place, and time.   Skin: Skin is warm and dry. She is not diaphoretic.   Right axilla small abscess noted with a small amount of yellowish purulent drainage noted    Psychiatric: She has a normal mood and affect.       Assessment:       1. Abscess of right axilla    2. Essential hypertension        Plan:         Abscess of right axilla  -     sulfamethoxazole-trimethoprim 800-160mg (BACTRIM DS) 800-160 mg Tab; Take 1 tablet by mouth 2 (two) times daily.  Dispense: 20 tablet; Refill: 0    Essential hypertension  - continue current blood pressure medication

## 2023-09-20 ENCOUNTER — PATIENT MESSAGE (OUTPATIENT)
Dept: PRIMARY CARE CLINIC | Facility: CLINIC | Age: 68
End: 2023-09-20
Payer: COMMERCIAL

## 2023-11-01 ENCOUNTER — TELEPHONE (OUTPATIENT)
Dept: OTOLARYNGOLOGY | Facility: CLINIC | Age: 68
End: 2023-11-01
Payer: COMMERCIAL

## 2023-11-01 NOTE — TELEPHONE ENCOUNTER
LM on patients VM that I received cancellations for Wednesday did she want me to put her there left number to please call me back 11/1 left several messages to call me back on my extension to schedule.

## 2023-11-01 NOTE — TELEPHONE ENCOUNTER
----- Message from Baljeet Castillo sent at 10/30/2023  4:04 PM CDT -----      Name of Who is Calling: GAYLE VIDES [[id number]]      What is the request in detail: Pt called in response of sooner appt pt said she cannot make tomorrow at 10am.Please contact to further discuss and advise.          Can the clinic reply by [facility]: Y      What Number to Call Back if not in [facility]: [phone]

## 2023-11-08 ENCOUNTER — OFFICE VISIT (OUTPATIENT)
Dept: PRIMARY CARE CLINIC | Facility: CLINIC | Age: 68
End: 2023-11-08
Payer: COMMERCIAL

## 2023-11-08 VITALS
BODY MASS INDEX: 16.08 KG/M2 | WEIGHT: 90.75 LBS | SYSTOLIC BLOOD PRESSURE: 152 MMHG | DIASTOLIC BLOOD PRESSURE: 80 MMHG | HEIGHT: 63 IN | HEART RATE: 94 BPM | OXYGEN SATURATION: 99 %

## 2023-11-08 DIAGNOSIS — M54.32 SCIATIC PAIN, LEFT: Primary | ICD-10-CM

## 2023-11-08 DIAGNOSIS — I10 ESSENTIAL HYPERTENSION: ICD-10-CM

## 2023-11-08 DIAGNOSIS — I10 ELEVATED BLOOD PRESSURE READING IN OFFICE WITH WHITE COAT SYNDROME, WITH DIAGNOSIS OF HYPERTENSION: ICD-10-CM

## 2023-11-08 PROCEDURE — 3288F PR FALLS RISK ASSESSMENT DOCUMENTED: ICD-10-PCS | Mod: CPTII,S$GLB,, | Performed by: NURSE PRACTITIONER

## 2023-11-08 PROCEDURE — 3079F DIAST BP 80-89 MM HG: CPT | Mod: CPTII,S$GLB,, | Performed by: NURSE PRACTITIONER

## 2023-11-08 PROCEDURE — 3077F PR MOST RECENT SYSTOLIC BLOOD PRESSURE >= 140 MM HG: ICD-10-PCS | Mod: CPTII,S$GLB,, | Performed by: NURSE PRACTITIONER

## 2023-11-08 PROCEDURE — 99214 OFFICE O/P EST MOD 30 MIN: CPT | Mod: 25,S$GLB,, | Performed by: NURSE PRACTITIONER

## 2023-11-08 PROCEDURE — 4010F PR ACE/ARB THEARPY RXD/TAKEN: ICD-10-PCS | Mod: CPTII,S$GLB,, | Performed by: NURSE PRACTITIONER

## 2023-11-08 PROCEDURE — 96372 THER/PROPH/DIAG INJ SC/IM: CPT | Mod: S$GLB,,, | Performed by: NURSE PRACTITIONER

## 2023-11-08 PROCEDURE — 3288F FALL RISK ASSESSMENT DOCD: CPT | Mod: CPTII,S$GLB,, | Performed by: NURSE PRACTITIONER

## 2023-11-08 PROCEDURE — 1101F PR PT FALLS ASSESS DOC 0-1 FALLS W/OUT INJ PAST YR: ICD-10-PCS | Mod: CPTII,S$GLB,, | Performed by: NURSE PRACTITIONER

## 2023-11-08 PROCEDURE — 1125F PR PAIN SEVERITY QUANTIFIED, PAIN PRESENT: ICD-10-PCS | Mod: CPTII,S$GLB,, | Performed by: NURSE PRACTITIONER

## 2023-11-08 PROCEDURE — 99214 PR OFFICE/OUTPT VISIT, EST, LEVL IV, 30-39 MIN: ICD-10-PCS | Mod: 25,S$GLB,, | Performed by: NURSE PRACTITIONER

## 2023-11-08 PROCEDURE — 4010F ACE/ARB THERAPY RXD/TAKEN: CPT | Mod: CPTII,S$GLB,, | Performed by: NURSE PRACTITIONER

## 2023-11-08 PROCEDURE — 3077F SYST BP >= 140 MM HG: CPT | Mod: CPTII,S$GLB,, | Performed by: NURSE PRACTITIONER

## 2023-11-08 PROCEDURE — 1125F AMNT PAIN NOTED PAIN PRSNT: CPT | Mod: CPTII,S$GLB,, | Performed by: NURSE PRACTITIONER

## 2023-11-08 PROCEDURE — 3008F BODY MASS INDEX DOCD: CPT | Mod: CPTII,S$GLB,, | Performed by: NURSE PRACTITIONER

## 2023-11-08 PROCEDURE — 99999 PR PBB SHADOW E&M-EST. PATIENT-LVL IV: ICD-10-PCS | Mod: PBBFAC,,, | Performed by: NURSE PRACTITIONER

## 2023-11-08 PROCEDURE — 96372 PR INJECTION,THERAP/PROPH/DIAG2ST, IM OR SUBCUT: ICD-10-PCS | Mod: S$GLB,,, | Performed by: NURSE PRACTITIONER

## 2023-11-08 PROCEDURE — 1159F PR MEDICATION LIST DOCUMENTED IN MEDICAL RECORD: ICD-10-PCS | Mod: CPTII,S$GLB,, | Performed by: NURSE PRACTITIONER

## 2023-11-08 PROCEDURE — 1159F MED LIST DOCD IN RCRD: CPT | Mod: CPTII,S$GLB,, | Performed by: NURSE PRACTITIONER

## 2023-11-08 PROCEDURE — 3008F PR BODY MASS INDEX (BMI) DOCUMENTED: ICD-10-PCS | Mod: CPTII,S$GLB,, | Performed by: NURSE PRACTITIONER

## 2023-11-08 PROCEDURE — 3079F PR MOST RECENT DIASTOLIC BLOOD PRESSURE 80-89 MM HG: ICD-10-PCS | Mod: CPTII,S$GLB,, | Performed by: NURSE PRACTITIONER

## 2023-11-08 PROCEDURE — 1101F PT FALLS ASSESS-DOCD LE1/YR: CPT | Mod: CPTII,S$GLB,, | Performed by: NURSE PRACTITIONER

## 2023-11-08 PROCEDURE — 99999 PR PBB SHADOW E&M-EST. PATIENT-LVL IV: CPT | Mod: PBBFAC,,, | Performed by: NURSE PRACTITIONER

## 2023-11-08 RX ORDER — DEXAMETHASONE SODIUM PHOSPHATE 4 MG/ML
4 INJECTION, SOLUTION INTRA-ARTICULAR; INTRALESIONAL; INTRAMUSCULAR; INTRAVENOUS; SOFT TISSUE
Status: COMPLETED | OUTPATIENT
Start: 2023-11-08 | End: 2023-11-08

## 2023-11-08 RX ORDER — KETOROLAC TROMETHAMINE 30 MG/ML
30 INJECTION, SOLUTION INTRAMUSCULAR; INTRAVENOUS
Status: COMPLETED | OUTPATIENT
Start: 2023-11-08 | End: 2023-11-08

## 2023-11-08 RX ADMIN — DEXAMETHASONE SODIUM PHOSPHATE 4 MG: 4 INJECTION, SOLUTION INTRA-ARTICULAR; INTRALESIONAL; INTRAMUSCULAR; INTRAVENOUS; SOFT TISSUE at 04:11

## 2023-11-08 RX ADMIN — KETOROLAC TROMETHAMINE 30 MG: 30 INJECTION, SOLUTION INTRAMUSCULAR; INTRAVENOUS at 04:11
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
## 2023-11-09 ENCOUNTER — TELEPHONE (OUTPATIENT)
Dept: NEUROSURGERY | Facility: CLINIC | Age: 68
End: 2023-11-09
Payer: COMMERCIAL

## 2023-11-09 ENCOUNTER — PATIENT MESSAGE (OUTPATIENT)
Dept: NEUROSURGERY | Facility: CLINIC | Age: 68
End: 2023-11-09
Payer: COMMERCIAL

## 2023-11-09 DIAGNOSIS — D33.3 ACOUSTIC NEUROMA: Primary | ICD-10-CM

## 2023-11-10 ENCOUNTER — PATIENT MESSAGE (OUTPATIENT)
Dept: NEUROSURGERY | Facility: CLINIC | Age: 68
End: 2023-11-10
Payer: COMMERCIAL

## 2023-11-15 ENCOUNTER — TELEPHONE (OUTPATIENT)
Dept: OTOLARYNGOLOGY | Facility: CLINIC | Age: 68
End: 2023-11-15
Payer: COMMERCIAL

## 2023-11-15 NOTE — TELEPHONE ENCOUNTER
----- Message from Michael Santiago sent at 11/14/2023  1:31 PM CST -----  Regarding: appt  Contact: @[phone]  Pt calling to get appt she missed on 10/25 reshed and would like 11/21 at around 1 pm ... Pls call and [phone] or email [email or web address]

## 2023-11-15 NOTE — TELEPHONE ENCOUNTER
LM on patients VM that we only have 10:00am and 10:30am on 12/20 available otherwise she would have to have to go to after new year.  Sent letter with date and time of appointment scheduled.

## 2023-11-16 ENCOUNTER — TELEPHONE (OUTPATIENT)
Dept: OTOLARYNGOLOGY | Facility: CLINIC | Age: 68
End: 2023-11-16
Payer: COMMERCIAL

## 2023-11-16 NOTE — TELEPHONE ENCOUNTER
----- Message from Eulalia Cid MA sent at 11/16/2023  2:08 PM CST -----  Regarding: FW: Return Call    ----- Message -----  From: Cynthia Sharma MA  Sent: 11/16/2023   1:36 PM CST  To: [facility] Sanon Staff  Subject: Return Call                                      Message Type: Return Call           Who Called:GAYLE VIDES [[id number]]              Who Left Message for Patient:Eulalia Cid MA                Does the patient know what this is regarding?  yes              Best Call Back Number: [phone]               Additional Information: Patient is returning a call.  Please assist.

## 2023-11-16 NOTE — TELEPHONE ENCOUNTER
LM on VM that I have her appointments set for 12/20 at 10:00am and 10:30am for audio and office visit if not that day will have to be in February of next year.

## 2023-11-16 NOTE — TELEPHONE ENCOUNTER
----- Message from Madalyn Corral sent at 11/16/2023 11:57 AM CST -----  Regarding: Returning Cakk              Name of Who is Calling:  Cathy Reynolds    Who Left The Message:  Cathy Reynolds      What is the request in detail:          Patient called stating she's returning the Office's call and would like you to please call again.  Please further advise.      Reply by [facility]: YES       Preferred Call Back :  [phone] (M)

## 2023-11-16 NOTE — TELEPHONE ENCOUNTER
11/16 LM on VM for the 2nd time today, finally spoke with patient requested a little later, I told was completely booked that was only time I had, patient accepted.

## 2023-11-17 ENCOUNTER — PATIENT MESSAGE (OUTPATIENT)
Dept: ADMINISTRATIVE | Facility: HOSPITAL | Age: 68
End: 2023-11-17
Payer: COMMERCIAL

## 2023-12-20 ENCOUNTER — CLINICAL SUPPORT (OUTPATIENT)
Dept: OTOLARYNGOLOGY | Facility: CLINIC | Age: 68
End: 2023-12-20
Payer: COMMERCIAL

## 2023-12-20 DIAGNOSIS — H93.299 REDUCED SPEECH DISCRIMINATION: ICD-10-CM

## 2023-12-20 DIAGNOSIS — H90.3 ASYMMETRICAL SENSORINEURAL HEARING LOSS: Primary | ICD-10-CM

## 2023-12-20 DIAGNOSIS — H91.90 HEARING DISORDER, UNSPECIFIED LATERALITY: Primary | ICD-10-CM

## 2023-12-20 DIAGNOSIS — R29.2 ABNORMAL ACOUSTIC REFLEX: ICD-10-CM

## 2023-12-20 DIAGNOSIS — H93.19 TINNITUS AURIUM, UNSPECIFIED LATERALITY: ICD-10-CM

## 2023-12-20 PROCEDURE — 92557 PR COMPREHENSIVE HEARING TEST: ICD-10-PCS | Mod: S$GLB,,, | Performed by: AUDIOLOGIST-HEARING AID FITTER

## 2023-12-20 PROCEDURE — 92550 TYMPANOMETRY & REFLEX THRESH: CPT | Mod: S$GLB,,, | Performed by: AUDIOLOGIST-HEARING AID FITTER

## 2023-12-20 PROCEDURE — 92557 COMPREHENSIVE HEARING TEST: CPT | Mod: S$GLB,,, | Performed by: AUDIOLOGIST-HEARING AID FITTER

## 2023-12-20 PROCEDURE — 92550 PR TYMPANOMETRY AND REFLEX THRESHOLD MEASUREMENTS: ICD-10-PCS | Mod: S$GLB,,, | Performed by: AUDIOLOGIST-HEARING AID FITTER

## 2023-12-20 NOTE — Clinical Note
Your patient, Cathy Reynolds, was recently seen for an audiogram.  My assessment and recommendations are enclosed.  If you should have any questions or concerns, please contact me at [phone].   Sincerely, Alfred Dobbs, CCC-A Audiologist [facility]

## 2023-12-28 NOTE — PROGRESS NOTES
Alfred Dobbs, CCC-A  Audiologist - [facility] [address] [place] [address]  [email or web address]  [phone]    Patient: Cathy Reynolds   MRN: [id number]  [id number]   Home Phone [phone]   Work Phone Not on file.   Mobile [phone]   : 1955  ROSEN: 2023      AUDIOLOGICAL EVALUATION      RECOMMENDATIONS:   It is recommended that Cathy Reynolds:  Follow up medically with Dr. Nunn.    Consider a BiCROS hearing aid system to improve speech understanding.  Continue to receive audiological monitoring annually.  Use precaution and/or hearing protection in noisy environments.    If you should have any questions or concerns regarding the above information, please do not hesitate to contact me at [phone].      _______________________________  Alfred Dobbs, GILLES-A  Audiologist

## 2024-01-02 ENCOUNTER — OFFICE VISIT (OUTPATIENT)
Dept: NEUROSURGERY | Facility: CLINIC | Age: 69
End: 2024-01-02
Payer: COMMERCIAL

## 2024-01-02 VITALS
HEART RATE: 104 BPM | WEIGHT: 93.5 LBS | BODY MASS INDEX: 16.57 KG/M2 | SYSTOLIC BLOOD PRESSURE: 161 MMHG | HEIGHT: 63 IN | DIASTOLIC BLOOD PRESSURE: 91 MMHG

## 2024-01-02 DIAGNOSIS — D33.3 ACOUSTIC NEUROMA: Primary | ICD-10-CM

## 2024-01-02 PROCEDURE — 3080F DIAST BP >= 90 MM HG: CPT | Mod: CPTII,S$GLB,, | Performed by: NEUROLOGICAL SURGERY

## 2024-01-02 PROCEDURE — 99214 OFFICE O/P EST MOD 30 MIN: CPT | Mod: S$GLB,,, | Performed by: NEUROLOGICAL SURGERY

## 2024-01-02 PROCEDURE — 1126F AMNT PAIN NOTED NONE PRSNT: CPT | Mod: CPTII,S$GLB,, | Performed by: NEUROLOGICAL SURGERY

## 2024-01-02 PROCEDURE — 3077F SYST BP >= 140 MM HG: CPT | Mod: CPTII,S$GLB,, | Performed by: NEUROLOGICAL SURGERY

## 2024-01-02 PROCEDURE — 99999 PR PBB SHADOW E&M-EST. PATIENT-LVL III: CPT | Mod: PBBFAC,,, | Performed by: NEUROLOGICAL SURGERY

## 2024-01-02 PROCEDURE — 1101F PT FALLS ASSESS-DOCD LE1/YR: CPT | Mod: CPTII,S$GLB,, | Performed by: NEUROLOGICAL SURGERY

## 2024-01-02 PROCEDURE — 3008F BODY MASS INDEX DOCD: CPT | Mod: CPTII,S$GLB,, | Performed by: NEUROLOGICAL SURGERY

## 2024-01-02 PROCEDURE — 1159F MED LIST DOCD IN RCRD: CPT | Mod: CPTII,S$GLB,, | Performed by: NEUROLOGICAL SURGERY

## 2024-01-02 PROCEDURE — 1160F RVW MEDS BY RX/DR IN RCRD: CPT | Mod: CPTII,S$GLB,, | Performed by: NEUROLOGICAL SURGERY

## 2024-01-02 PROCEDURE — 3288F FALL RISK ASSESSMENT DOCD: CPT | Mod: CPTII,S$GLB,, | Performed by: NEUROLOGICAL SURGERY

## 2024-01-02 NOTE — PATIENT INSTRUCTIONS
I have personally reviewed the MRI brain with the pt which shows postsurgical changes of prior radiosurgery. The schwannoma is stable in size. There is no associated vasogenic edema.    I will schedule the patient for 1 year follow up with MRI brain.   normal (ped)...

## 2024-01-02 NOTE — PROGRESS NOTES
Subjective:   I, Margarita Lui, attest that this documentation has been prepared under the direction and in the presence of Orion Woods MD.     Patient ID: Cathy Reynolds is a 68 y.o. female     Chief Complaint: No chief complaint on file.      HPI  MsRenaldo Reynolds is a 68 y.o. woman with an acoustic neuroma, treated with Gamma Knife Radiosurgery on 1/25/2019, who presents today for 1 year follow up with MRI brain. At the time of our last clinic visit on 12/20/2023, the pt reports she remains functional at baseline and is working as normal. Of note, pt reports a recent ED visit last month at which time she was evaluated for persistent tremors. Otherwise, she is doing well in the interim with no new complaints.    Today the pt reports she is doing well overall. She has been actively exercising and maintaining normal function without complication.    Review of Systems   Constitutional:  Negative for activity change, appetite change, fatigue, fever and unexpected weight change.   HENT:  Negative for facial swelling.    Eyes: Negative.    Respiratory: Negative.     Cardiovascular: Negative.    Gastrointestinal:  Negative for diarrhea, nausea and vomiting.   Endocrine: Negative.    Genitourinary: Negative.    Musculoskeletal:  Negative for back pain, joint swelling, myalgias and neck pain.   Neurological:  Negative for dizziness, seizures, weakness, numbness and headaches.   Psychiatric/Behavioral: Negative.          Past Medical History:   Diagnosis Date    AR (allergic rhinitis)     Breast cancer     RIGHT    CHF (congestive heart failure)     Diverticulosis     Ear pain, right     Elevated LFTs     borderline - due to OTC herbals - resolved    History of colon polyps     Hyperlipidemia     borderline with high HDL    Hypertension     Mild anxiety     Osteoporosis, postmenopausal     Postmenopausal status     Ringing in ear, right     Underweight     Vitamin D deficiency        Objective:      Vitals:     01/02/24 1354   BP: (!) 161/91   Pulse: 104      Physical Exam  Constitutional:       General: She is not in acute distress.     Appearance: Normal appearance.   HENT:      Head: Normocephalic and atraumatic.   Pulmonary:      Effort: Pulmonary effort is normal.   Musculoskeletal:      Cervical back: Neck supple.   Neurological:      Mental Status: She is alert and oriented to person, place, and time.      GCS: GCS eye subscore is 4. GCS verbal subscore is 5. GCS motor subscore is 6.      Cranial Nerves: No cranial nerve deficit.          IMAGING:  MRI Brain (outside disc):  Postsurgical changes of prior radiosurgery. The schwannoma is stable in size. There is no associated vasogenic edema.      I have personally reviewed the images with the pt.      I, Dr. Orion Woods, personally performed the services described in this documentation. All medical record entries made by the scribe, Margarita Lui, were at my direction and in my presence.  I have reviewed the chart and agree that the record reflects my personal performance and is accurate and complete. Orion Woods MD. 01/02/2024    Assessment:       Acoustic neuroma.      Plan:   I have personally reviewed the MRI brain with the pt which shows postsurgical changes of prior radiosurgery. The schwannoma is stable in size. There is no associated vasogenic edema.    I will schedule the patient for 1 year follow up with MRI brain.

## 2024-01-05 ENCOUNTER — TELEPHONE (OUTPATIENT)
Dept: NEUROSURGERY | Facility: CLINIC | Age: 69
End: 2024-01-05
Payer: COMMERCIAL

## 2024-01-07 DIAGNOSIS — I10 ESSENTIAL HYPERTENSION: ICD-10-CM

## 2024-01-07 NOTE — TELEPHONE ENCOUNTER
Care Due:                  Date            Visit Type   Department     Provider  --------------------------------------------------------------------------------                                NP -                              PRIMARY      [facility] PRIMARY  Last Visit: 06-      CARE ([facility])   MANAN Quigley  Next Visit: None Scheduled  None         None Found                                                            Last  Test          Frequency    Reason                     Performed    Due Date  --------------------------------------------------------------------------------    CMP.........  12 months..  olmesartan...............  03- 11-    [facility] Embedded Care Due Messages. Reference number: [id number].   1/07/2024 4:29:46 AM CST

## 2024-01-08 DIAGNOSIS — I10 ESSENTIAL HYPERTENSION: ICD-10-CM

## 2024-01-08 RX ORDER — OLMESARTAN MEDOXOMIL 5 MG/1
5 TABLET ORAL
Qty: 30 TABLET | Refills: 0 | Status: SHIPPED | OUTPATIENT
Start: 2024-01-08 | End: 2024-01-09

## 2024-01-08 NOTE — TELEPHONE ENCOUNTER
No care due was identified.  [facility] Embedded Care Due Messages. Reference number: [id number].   1/08/2024 3:18:49 PM CST   The patient is a 28y Male complaining of cough.

## 2024-01-08 NOTE — TELEPHONE ENCOUNTER
Refill Routing Note   Medication(s) are not appropriate for processing by [facility] for the following reason(s):      Required vitals abnormal    [facility] action(s):  Defer Care Due:  Labs due            Appointments  past 12m or future 3m with PCP    Date Provider   Last Visit   6/19/2023 Jane Quigley MD   Next Visit   Visit date not found Jane Quigley MD   ED visits in past 90 days: 0        Note composed:12:51 PM 01/08/2024

## 2024-01-09 RX ORDER — OLMESARTAN MEDOXOMIL 5 MG/1
5 TABLET ORAL
Qty: 90 TABLET | Refills: 0 | Status: SHIPPED | OUTPATIENT
Start: 2024-01-09

## 2024-01-09 NOTE — TELEPHONE ENCOUNTER
Refill Routing Note   Medication(s) are not appropriate for processing by [facility] for the following reason(s):        Required vitals abnormal    [facility] action(s):  Defer             Appointments  past 12m or future 3m with PCP    Date Provider   Last Visit   6/19/2023 Jane Quigley MD   Next Visit   Visit date not found Jane Quigley MD   ED visits in past 90 days: 0        Note composed:10:58 AM 01/09/2024

## 2024-01-31 NOTE — Clinical Note
-SBP goal <  140  -PRN labetalol/hydralazine  01/30: hold bp lowering agents while in process of developing SIRS/sepsis  01/31: dilt added mostly due to refractory RVR   F/U 4MOS with cbc,cmp at [facility]

## 2024-02-22 ENCOUNTER — TELEPHONE (OUTPATIENT)
Dept: OTOLARYNGOLOGY | Facility: CLINIC | Age: 69
End: 2024-02-22
Payer: COMMERCIAL

## 2024-02-22 NOTE — TELEPHONE ENCOUNTER
----- Message from Eulalia Cid MA sent at 2/22/2024  1:59 PM CST -----  Regarding: FW: call back    ----- Message -----  From: Kaylynn Karimi AU.D  Sent: 2/22/2024   1:51 PM CST  To: Eulalia Cid MA  Subject: RE: call back                                    She wants HAC?  I can do March 26th @ 3?  ----- Message -----  From: Eulalia Cid MA  Sent: 2/22/2024   1:49 PM CST  To: NEYDA Dobbs  Subject: RE: call back                                    Saw both of you'll in December she's BC  ----- Message -----  From: Hafsa Akers  Sent: 2/22/2024   1:37 PM CST  To: Edouard Sanon Staff  Subject: call back                                        Type: Patient Call Back    Who called:pt    What is the request in detail:requesting a call back to discuss getting a hearing aid and potentially set up an appt to be seen prior to orders being submitted?     Can the clinic reply by [facility]?no    Would the patient rather a call back or a response via [facility]? call    Best call back number: [phone]     Additional Information:

## 2024-02-26 ENCOUNTER — TELEPHONE (OUTPATIENT)
Dept: OTOLARYNGOLOGY | Facility: CLINIC | Age: 69
End: 2024-02-26
Payer: COMMERCIAL

## 2024-02-26 NOTE — TELEPHONE ENCOUNTER
Patient left message on machine that had to cancel her 3/26 @ 3:00pm / left her message to see when does she want to RS for / appointment was for HAC.

## 2024-03-06 ENCOUNTER — TELEPHONE (OUTPATIENT)
Dept: OTOLARYNGOLOGY | Facility: CLINIC | Age: 69
End: 2024-03-06
Payer: COMMERCIAL

## 2024-03-06 NOTE — TELEPHONE ENCOUNTER
LM on patient's VM that an appointment has been made for HAC for 4/3  9:30am put a letter in mail with date and time.

## 2024-03-08 ENCOUNTER — PATIENT MESSAGE (OUTPATIENT)
Dept: SURGERY | Facility: CLINIC | Age: 69
End: 2024-03-08
Payer: COMMERCIAL

## 2024-04-03 ENCOUNTER — CLINICAL SUPPORT (OUTPATIENT)
Dept: OTOLARYNGOLOGY | Facility: CLINIC | Age: 69
End: 2024-04-03
Payer: COMMERCIAL

## 2024-04-03 ENCOUNTER — PATIENT MESSAGE (OUTPATIENT)
Dept: ADMINISTRATIVE | Facility: HOSPITAL | Age: 69
End: 2024-04-03
Payer: COMMERCIAL

## 2024-04-03 DIAGNOSIS — Z71.89 ENCOUNTER FOR HEARING AID CONSULTATION: Primary | ICD-10-CM

## 2024-04-03 PROCEDURE — 99499 UNLISTED E&M SERVICE: CPT | Mod: ,,, | Performed by: AUDIOLOGIST-HEARING AID FITTER

## 2024-04-03 NOTE — PROGRESS NOTES
Alfred Dobbs, CCC-A  Audiologist - [facility] [address] [place] [address]  [email or web address]  [phone]    Patient: Cathy Reynolds   MRN: [id number]   [address]  Home Phone [phone]   Work Phone Not on file.   Mobile [phone]   : 1955  ROSEN: 4/3/2024      HEARING AID CONSULT    Cathy Reynolds was seen today with her spouse for a Hearing Aid Consultation.  We briefly discussed her hearing loss, and she feels her hearing has declined and is ready to pursue hearing amplification at this time.  A BiCROS hearing aid system is recommended.  She has not worn hearing aids before, but has concerns about the cost of the devices.  She acknowledged understanding of the fact that we do not file insurance on behalf of the patient for hearing aid purchases.  She would like to consider her financial options with her insurance.  She was given a copy of her hearing test and it was recommended that she contact the in-network provider for her insurance company ([facility]) regarding her hearing aid benefits.  Cathy Reynolds was encouraged to continue her appointments with Dr. Nunn for regular ear exams, annual audiograms, and to reach out to the clinic with any questions she may have regarding today's visit.  She verbalized understanding and satisfaction with her visit.     _______________________________  Alfred Dobbs, GILLES-A  Audiologist

## 2024-04-04 ENCOUNTER — PATIENT MESSAGE (OUTPATIENT)
Dept: OTOLARYNGOLOGY | Facility: CLINIC | Age: 69
End: 2024-04-04
Payer: COMMERCIAL

## 2024-04-06 DIAGNOSIS — J30.1 ACUTE SEASONAL ALLERGIC RHINITIS DUE TO POLLEN: ICD-10-CM

## 2024-04-06 NOTE — TELEPHONE ENCOUNTER
Care Due:                  Date            Visit Type   Department     Provider  --------------------------------------------------------------------------------                                NP -                              PRIMARY      [facility] PRIMARY  Last Visit: 06-      CARE ([facility])   MANAN Quigley  Next Visit: None Scheduled  None         None Found                                                            Last  Test          Frequency    Reason                     Performed    Due Date  --------------------------------------------------------------------------------    CMP.........  12 months..  olmesartan...............  Not Found    Overdue    [employer] Embedded Care Due Messages. Reference number: [id number].   4/06/2024 4:33:33 AM CDT

## 2024-04-07 NOTE — TELEPHONE ENCOUNTER
Refill Routing Note   Medication(s) are not appropriate for processing by [facility] for the following reason(s):        No active prescription written by provider    [facility] action(s):  Defer   Requires labs : Yes             Appointments  past 12m or future 3m with PCP    Date Provider   Last Visit   6/19/2023 Jane Quigley MD   Next Visit   Visit date not found Jane Quigley MD   ED visits in past 90 days: 0        Note composed:11:24 PM 04/06/2024

## 2024-04-09 RX ORDER — FLUTICASONE PROPIONATE 50 MCG
SPRAY, SUSPENSION (ML) NASAL
Qty: 48 G | Refills: 0 | Status: SHIPPED | OUTPATIENT
Start: 2024-04-09 | End: 2024-05-01 | Stop reason: SDUPTHER

## 2024-04-15 ENCOUNTER — HOSPITAL ENCOUNTER (OUTPATIENT)
Dept: RADIOLOGY | Facility: OTHER | Age: 69
Discharge: HOME | End: 2024-04-15
Attending: SURGERY
Payer: COMMERCIAL

## 2024-04-15 DIAGNOSIS — Z12.31 SCREENING MAMMOGRAM FOR HIGH-RISK PATIENT: ICD-10-CM

## 2024-04-15 PROCEDURE — 77063 BREAST TOMOSYNTHESIS BI: CPT | Mod: 26,52,, | Performed by: RADIOLOGY

## 2024-04-15 PROCEDURE — 77067 SCR MAMMO BI INCL CAD: CPT | Mod: 26,52,, | Performed by: RADIOLOGY

## 2024-04-15 PROCEDURE — 77063 BREAST TOMOSYNTHESIS BI: CPT | Mod: TC,52

## 2024-05-01 ENCOUNTER — OFFICE VISIT (OUTPATIENT)
Dept: PRIMARY CARE CLINIC | Facility: CLINIC | Age: 69
End: 2024-05-01
Payer: COMMERCIAL

## 2024-05-01 VITALS
OXYGEN SATURATION: 96 % | SYSTOLIC BLOOD PRESSURE: 144 MMHG | HEART RATE: 85 BPM | HEIGHT: 63 IN | WEIGHT: 90.63 LBS | DIASTOLIC BLOOD PRESSURE: 94 MMHG | TEMPERATURE: 99 F | BODY MASS INDEX: 16.06 KG/M2

## 2024-05-01 DIAGNOSIS — I10 ESSENTIAL HYPERTENSION: ICD-10-CM

## 2024-05-01 DIAGNOSIS — J30.1 ACUTE SEASONAL ALLERGIC RHINITIS DUE TO POLLEN: ICD-10-CM

## 2024-05-01 DIAGNOSIS — M54.16 LUMBAR RADICULOPATHY: Primary | ICD-10-CM

## 2024-05-01 PROCEDURE — 1125F AMNT PAIN NOTED PAIN PRSNT: CPT | Mod: CPTII,S$GLB,, | Performed by: STUDENT IN AN ORGANIZED HEALTH CARE EDUCATION/TRAINING PROGRAM

## 2024-05-01 PROCEDURE — 99214 OFFICE O/P EST MOD 30 MIN: CPT | Mod: S$GLB,,, | Performed by: STUDENT IN AN ORGANIZED HEALTH CARE EDUCATION/TRAINING PROGRAM

## 2024-05-01 PROCEDURE — 1101F PT FALLS ASSESS-DOCD LE1/YR: CPT | Mod: CPTII,S$GLB,, | Performed by: STUDENT IN AN ORGANIZED HEALTH CARE EDUCATION/TRAINING PROGRAM

## 2024-05-01 PROCEDURE — 1159F MED LIST DOCD IN RCRD: CPT | Mod: CPTII,S$GLB,, | Performed by: STUDENT IN AN ORGANIZED HEALTH CARE EDUCATION/TRAINING PROGRAM

## 2024-05-01 PROCEDURE — 3288F FALL RISK ASSESSMENT DOCD: CPT | Mod: CPTII,S$GLB,, | Performed by: STUDENT IN AN ORGANIZED HEALTH CARE EDUCATION/TRAINING PROGRAM

## 2024-05-01 PROCEDURE — 99999 PR PBB SHADOW E&M-EST. PATIENT-LVL IV: CPT | Mod: PBBFAC,,, | Performed by: STUDENT IN AN ORGANIZED HEALTH CARE EDUCATION/TRAINING PROGRAM

## 2024-05-01 PROCEDURE — 4010F ACE/ARB THERAPY RXD/TAKEN: CPT | Mod: CPTII,S$GLB,, | Performed by: STUDENT IN AN ORGANIZED HEALTH CARE EDUCATION/TRAINING PROGRAM

## 2024-05-01 PROCEDURE — 3077F SYST BP >= 140 MM HG: CPT | Mod: CPTII,S$GLB,, | Performed by: STUDENT IN AN ORGANIZED HEALTH CARE EDUCATION/TRAINING PROGRAM

## 2024-05-01 PROCEDURE — 3080F DIAST BP >= 90 MM HG: CPT | Mod: CPTII,S$GLB,, | Performed by: STUDENT IN AN ORGANIZED HEALTH CARE EDUCATION/TRAINING PROGRAM

## 2024-05-01 PROCEDURE — 3008F BODY MASS INDEX DOCD: CPT | Mod: CPTII,S$GLB,, | Performed by: STUDENT IN AN ORGANIZED HEALTH CARE EDUCATION/TRAINING PROGRAM

## 2024-05-01 RX ORDER — AZELASTINE 1 MG/ML
1 SPRAY, METERED NASAL DAILY PRN
Qty: 30 ML | Refills: 0 | Status: SHIPPED | OUTPATIENT
Start: 2024-05-01

## 2024-05-01 RX ORDER — OLMESARTAN MEDOXOMIL 5 MG/1
5 TABLET ORAL DAILY
Qty: 90 TABLET | Refills: 0 | Status: SHIPPED | OUTPATIENT
Start: 2024-05-01 | End: 2024-07-30

## 2024-05-01 RX ORDER — FLUTICASONE PROPIONATE 50 MCG
SPRAY, SUSPENSION (ML) NASAL
Qty: 48 G | Refills: 1 | Status: SHIPPED | OUTPATIENT
Start: 2024-05-01
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
## 2024-05-08 ENCOUNTER — TELEPHONE (OUTPATIENT)
Dept: HEMATOLOGY/ONCOLOGY | Facility: CLINIC | Age: 69
End: 2024-05-08
Payer: COMMERCIAL

## 2024-05-08 NOTE — TELEPHONE ENCOUNTER
Pt was scheduled for her appt, she was suppose to return in 4 months, but hasn't been seen since 03/2023. Can new orders for labs be put in so I can fax them to [email or web address]?     Thanks,  Gloria

## 2024-05-08 NOTE — TELEPHONE ENCOUNTER
----- Message from Karan Contreras RN sent at 5/8/2024  1:49 PM CDT -----  Regarding: FW: Appt  Contact: Pt  [phone]   Can you assist her with scheduling her appointment and enter an order for [facility]. Thank you Karan  ----- Message -----  From: Nimisha Blevins  Sent: 5/8/2024   1:40 PM CDT  To: Angela Meier Staff  Subject: Appt                                                         Appt Type:  Ep and Lab      Date/Time Preference:   Monday's around 3     Treating Provider:      Caller Name:   Cathy      Contact Prefer:  [phone]    Comments/Notes:   Called to schedule appt and labs if needed  please send orders to [facility] on [address]

## 2024-05-09 DIAGNOSIS — D05.10 DUCTAL CARCINOMA IN SITU (DCIS) OF BREAST, UNSPECIFIED LATERALITY: Primary | ICD-10-CM

## 2024-06-04 ENCOUNTER — TELEPHONE (OUTPATIENT)
Dept: HEMATOLOGY/ONCOLOGY | Facility: CLINIC | Age: 69
End: 2024-06-04
Payer: COMMERCIAL

## 2024-06-04 NOTE — TELEPHONE ENCOUNTER
----- Message from Alisha Lyles sent at 6/4/2024  3:20 PM CDT -----  Regarding: Requesting Order  Contact: pt [phone]  Pt calling to request orders to be sent to [facility] on [facility] in order to get blood work done. Pt called [facility] and was told no orders or appt  was in the system for her to be seen tomorrow  6/5. Pls call [phone]

## 2024-06-12 ENCOUNTER — TELEPHONE (OUTPATIENT)
Dept: ORTHOPEDICS | Facility: CLINIC | Age: 69
End: 2024-06-12
Payer: COMMERCIAL

## 2024-06-13 ENCOUNTER — OFFICE VISIT (OUTPATIENT)
Dept: HEMATOLOGY/ONCOLOGY | Facility: CLINIC | Age: 69
End: 2024-06-13
Payer: COMMERCIAL

## 2024-06-13 VITALS
DIASTOLIC BLOOD PRESSURE: 88 MMHG | OXYGEN SATURATION: 97 % | SYSTOLIC BLOOD PRESSURE: 181 MMHG | WEIGHT: 90.38 LBS | TEMPERATURE: 98 F | HEIGHT: 63 IN | HEART RATE: 90 BPM | BODY MASS INDEX: 16.02 KG/M2

## 2024-06-13 DIAGNOSIS — Z90.11 S/P RIGHT MASTECTOMY: Primary | ICD-10-CM

## 2024-06-13 DIAGNOSIS — Z86.000 HISTORY OF DUCTAL CARCINOMA IN SITU (DCIS) OF BREAST: ICD-10-CM

## 2024-06-13 DIAGNOSIS — I10 ESSENTIAL HYPERTENSION: ICD-10-CM

## 2024-06-13 DIAGNOSIS — D05.10 DUCTAL CARCINOMA IN SITU (DCIS) OF BREAST, UNSPECIFIED LATERALITY: Primary | ICD-10-CM

## 2024-06-13 PROCEDURE — 99214 OFFICE O/P EST MOD 30 MIN: CPT | Mod: S$GLB,,, | Performed by: INTERNAL MEDICINE

## 2024-06-13 PROCEDURE — G2211 COMPLEX E/M VISIT ADD ON: HCPCS | Mod: S$GLB,,, | Performed by: INTERNAL MEDICINE

## 2024-06-13 PROCEDURE — 1126F AMNT PAIN NOTED NONE PRSNT: CPT | Mod: CPTII,S$GLB,, | Performed by: INTERNAL MEDICINE

## 2024-06-13 PROCEDURE — 1159F MED LIST DOCD IN RCRD: CPT | Mod: CPTII,S$GLB,, | Performed by: INTERNAL MEDICINE

## 2024-06-13 PROCEDURE — 4010F ACE/ARB THERAPY RXD/TAKEN: CPT | Mod: CPTII,S$GLB,, | Performed by: INTERNAL MEDICINE

## 2024-06-13 PROCEDURE — 3008F BODY MASS INDEX DOCD: CPT | Mod: CPTII,S$GLB,, | Performed by: INTERNAL MEDICINE

## 2024-06-13 PROCEDURE — 99999 PR PBB SHADOW E&M-EST. PATIENT-LVL III: CPT | Mod: PBBFAC,,, | Performed by: INTERNAL MEDICINE

## 2024-06-13 PROCEDURE — 3079F DIAST BP 80-89 MM HG: CPT | Mod: CPTII,S$GLB,, | Performed by: INTERNAL MEDICINE

## 2024-06-13 PROCEDURE — 3077F SYST BP >= 140 MM HG: CPT | Mod: CPTII,S$GLB,, | Performed by: INTERNAL MEDICINE

## 2024-06-13 NOTE — PROGRESS NOTES
[facility]/ [facility] Note:      Chief Complaint:   Encounter Diagnoses   Name Primary?    Ductal carcinoma in situ (DCIS) of breast, unspecified laterality Yes    Essential hypertension         Cancer Staging   Ductal carcinoma in situ (DCIS) of breast  Staging form: Breast, AJCC 8th Edition  - Pathologic stage from 2021: Stage 0 (pTis (DCIS), pN0, cM0, G2, ER+, WV+, HER2: Not Assessed) - Signed by Jaja Dc MD on 2024      HPI:  Cathy Reynolds is a 68 y.o. female who presents today for follow up. She was seen last year as transfer of care from [facility]. She has been doing well. No new complaints     Oncology History   Abnormal screening mammogram 21   Follow-up mammogram (21) showed Right breast 72 mm calcifications at the posterior 6 o'clock position.   Stereotactic biopsy 21: DCIS, grade 2, ER 90%/ WV 30%    Right mastectomy and SLNB on 21: DCIS, grade 2; 0/1 LN+   Discussion of adjuvant endocrine therapy for contralateral ppx, but patient opted against      GYN History:  Age of menarche was 11.   Age of menopause was 48.  Last menstrual period was 48. Patient denies hormonal therapy.   Patient is . Age of first live birth was 31. Patient did not breast feed.      Patient Active Problem List   Diagnosis    Osteoporosis, postmenopausal    Postmenopausal status    Diverticulosis    Mild anxiety    Underweight    AR (allergic rhinitis)    Essential hypertension    Vitamin D deficiency    H/O brain tumor    History of ductal carcinoma in situ (DCIS) of breast    Osteopenia    History of acoustic neuroma    Ductal carcinoma in situ (DCIS) of breast       Current Outpatient Medications   Medication Instructions    azelastine (ASTELIN) 137 mcg, Nasal, Daily PRN    cholecalciferol, vitamin D3, (VITAMIN D3) 50 mcg (2,000 unit) Cap 2 capsules, Oral, Daily    clotrimazole-betamethasone 1-0.05%  "(LOTRISONE) cream Topical (Top), 2 times daily    cyanocobalamin (VITAMIN B-12) 100 mcg, Oral, Daily PRN    fluticasone propionate (FLONASE) 50 mcg/actuation nasal spray SHAKE LIQUID AND USE 1 SPRAY IN EACH NOSTRIL TWICE DAILY    folic acid/multivit-min/lutein (CENTRUM SILVER ORAL) Oral    olmesartan (BENICAR) 5 mg, Oral, Daily       Review of Systems:   Review of Systems   Constitutional: Negative.    HENT: Negative.     Respiratory: Negative.     Cardiovascular: Negative.    Gastrointestinal: Negative.    Musculoskeletal: Negative.    All other systems reviewed and are negative.      PHYSICAL EXAM:  BP (!) 181/88   Pulse 90   Temp 98.2 °F (36.8 °C) (Oral)   Ht 5' 3" (1.6 m)   Wt 41 kg (90 lb 6.2 oz)   SpO2 97%   BMI 16.01 kg/m²     General Appearance:    Alert, cooperative, no distress, appears stated age   Lungs:     Clear to auscultation bilaterally, respirations unlabored    Heart:    Regular rate and rhythm, S1 and S2 normal   Breast Exam:    S/p right mastectomy, no chest wall mass or nodularity. Left breast without mass, nodule or skin changes. Nipple without inversion. No axillary or supraclavicular adenopathy.   Abdomen:     Soft, non-tender, bowel sounds active, no masses, no organomegaly   Extremities:   Extremities normal, atraumatic, no cyanosis or edema   Pulses:   2+ and symmetric all extremities   Skin:   Skin color, texture, turgor normal, no rashes or lesions   Lymph nodes:   Cervical, supraclavicular, and axillary nodes normal   Neurologic:   CNII-XII intact, normal strength, sensation and reflexes     throughout           Pertinent Labs & Imaging:  Pathology Results  (Last 30 days)      None            No results found for this or any previous visit (from the past 24 hour(s)).    Assessment & Plan:    1. Ductal carcinoma in situ (DCIS) of breast, unspecified laterality    2. Essential hypertension    Patient with history of DCIS s/p R mastectomy Sept 2021  Currently on observation  L " breast mammogram April 2024; repeat April 2025  Will plan for 6 mos follow up      Per NCCN Guidelines, breast cancer patients should be followed every 3-12 months for the first five years and then annually thereafter with annual mammography if mastectomy or breast reconstruction was not undertaken. At this time, there is no indication for routine laboratory or imaging in the surveillance for metastatic disease, unless clinical signs or symptoms arise.    Healthy diet, low alcohol intake, and an active lifestyle, with a goal of an ideal BMI (20-25) is also encouraged to reduce the risk of breast cancer occurrences and recurrences, as well as improve side effects to anti-estrogen medications      Route Chart for Scheduling    Med Onc Chart Routing      Follow up with physician 1 year.   Follow up with VANDANA 6 months.   Infusion scheduling note    Injection scheduling note    Labs    Imaging    Pharmacy appointment    Other referrals                       MDM includes  :    - Acute or chronic illness or injury that poses a threat to life or bodily function  - Review of prior external notes from unique source  - Independent review and explanation of 1 results from unique tests  - Extensive discussion of treatment and management          Jaja Dc MD   06/13/2024

## 2024-06-14 ENCOUNTER — TELEPHONE (OUTPATIENT)
Dept: ORTHOPEDICS | Facility: CLINIC | Age: 69
End: 2024-06-14
Payer: COMMERCIAL

## 2024-06-14 NOTE — TELEPHONE ENCOUNTER
Spoke with pt, pt states she will call back to schedule an appointment if she does not have to have any imaging done.   ----- Message from Olya Brannon sent at 6/14/2024  4:04 PM CDT -----  Type:  Appointment Request         Name of Caller:pt  When is the first available appointment?No access  Symptoms:Lumbar radiculopathy  Would the patient rather a call back or a response via [facility]? call  Best Call Back Number:[phone] please message if no answer

## 2024-07-02 ENCOUNTER — TELEPHONE (OUTPATIENT)
Dept: HEMATOLOGY/ONCOLOGY | Facility: CLINIC | Age: 69
End: 2024-07-02
Payer: COMMERCIAL

## 2024-07-02 DIAGNOSIS — D05.10 DUCTAL CARCINOMA IN SITU (DCIS) OF BREAST, UNSPECIFIED LATERALITY: Primary | ICD-10-CM

## 2024-07-02 NOTE — TELEPHONE ENCOUNTER
----- Message from Keri Bolton sent at 7/2/2024  2:40 PM CDT -----  Regarding: Consult/Advisory  Contact: [employer]  Consult/Advisory    Name Of Caller: Xavier Ahmadi Obsorb      Contact Preference: [phone]     Fax:  [phone]    Nature of call: Patient fell in love with prosthetic bra and would like a script for it. Requesting a fax attn Daiana.

## 2024-07-11 ENCOUNTER — PATIENT MESSAGE (OUTPATIENT)
Dept: OTOLARYNGOLOGY | Facility: CLINIC | Age: 69
End: 2024-07-11
Payer: COMMERCIAL

## 2024-07-28 DIAGNOSIS — I10 ESSENTIAL HYPERTENSION: ICD-10-CM

## 2024-07-28 NOTE — TELEPHONE ENCOUNTER
Care Due:                  Date            Visit Type   Department     Provider  --------------------------------------------------------------------------------                                             [facility] PRIMARY  Last Visit: 05-      [facility]           Elias Marin  Next Visit: None Scheduled  None         None Found                                                            Last  Test          Frequency    Reason                     Performed    Due Date  --------------------------------------------------------------------------------    CMP.........  12 months..  olmesartan...............  Not Found    Overdue    [employer] Embedded Care Due Messages. Reference number: [id number].   7/28/2024 4:31:10 AM CDT

## 2024-07-29 RX ORDER — OLMESARTAN MEDOXOMIL 5 MG/1
5 TABLET ORAL
Qty: 90 TABLET | Refills: 3 | Status: SHIPPED | OUTPATIENT
Start: 2024-07-29

## 2024-07-29 NOTE — TELEPHONE ENCOUNTER
Refill Routing Note   Medication(s) are not appropriate for processing by [facility] for the following reason(s):        Required labs outdated  Required vitals abnormal    [facility] action(s):  Defer     Requires labs : Yes             Appointments  past 12m or future 3m with PCP    Date Provider   Last Visit   5/1/2024 Elias Marin MD   Next Visit   Visit date not found Elias Marin MD   ED visits in past 90 days: 0        Note composed:2:33 PM 07/29/2024

## 2024-08-19 ENCOUNTER — PATIENT MESSAGE (OUTPATIENT)
Dept: HEMATOLOGY/ONCOLOGY | Facility: CLINIC | Age: 69
End: 2024-08-19
Payer: COMMERCIAL

## 2024-08-20 ENCOUNTER — TELEPHONE (OUTPATIENT)
Dept: HEMATOLOGY/ONCOLOGY | Facility: CLINIC | Age: 69
End: 2024-08-20
Payer: COMMERCIAL

## 2024-08-20 DIAGNOSIS — D05.10 DUCTAL CARCINOMA IN SITU (DCIS) OF BREAST, UNSPECIFIED LATERALITY: Primary | ICD-10-CM

## 2024-09-05 ENCOUNTER — TELEPHONE (OUTPATIENT)
Dept: ORTHOPEDICS | Facility: CLINIC | Age: 69
End: 2024-09-05
Payer: COMMERCIAL

## 2024-09-05 DIAGNOSIS — M51.36 DDD (DEGENERATIVE DISC DISEASE), LUMBAR: Primary | ICD-10-CM

## 2024-09-05 NOTE — TELEPHONE ENCOUNTER
Attempt to contact patient regarding x-ray. Left message stating that the x-ray are scheduled for 12:30 pm at the [facility] on 09/10/2024. Also left number for patient to return call back to [phone]. Thanks.

## 2024-09-10 ENCOUNTER — OFFICE VISIT (OUTPATIENT)
Dept: ORTHOPEDICS | Facility: CLINIC | Age: 69
End: 2024-09-10
Payer: COMMERCIAL

## 2024-09-10 ENCOUNTER — HOSPITAL ENCOUNTER (OUTPATIENT)
Dept: RADIOLOGY | Facility: HOSPITAL | Age: 69
Discharge: HOME | End: 2024-09-10
Attending: REGISTERED NURSE
Payer: COMMERCIAL

## 2024-09-10 VITALS — HEIGHT: 63 IN | BODY MASS INDEX: 16.13 KG/M2 | WEIGHT: 91.06 LBS

## 2024-09-10 DIAGNOSIS — M54.16 LUMBAR RADICULOPATHY: ICD-10-CM

## 2024-09-10 DIAGNOSIS — M51.36 DDD (DEGENERATIVE DISC DISEASE), LUMBAR: ICD-10-CM

## 2024-09-10 DIAGNOSIS — M41.115 JUVENILE IDIOPATHIC SCOLIOSIS OF THORACOLUMBAR REGION: Primary | ICD-10-CM

## 2024-09-10 PROCEDURE — 3288F FALL RISK ASSESSMENT DOCD: CPT | Mod: CPTII,S$GLB,, | Performed by: REGISTERED NURSE

## 2024-09-10 PROCEDURE — 99999 PR PBB SHADOW E&M-EST. PATIENT-LVL III: CPT | Mod: PBBFAC,,, | Performed by: REGISTERED NURSE

## 2024-09-10 PROCEDURE — 72110 X-RAY EXAM L-2 SPINE 4/>VWS: CPT | Mod: TC

## 2024-09-10 PROCEDURE — 1160F RVW MEDS BY RX/DR IN RCRD: CPT | Mod: CPTII,S$GLB,, | Performed by: REGISTERED NURSE

## 2024-09-10 PROCEDURE — 4010F ACE/ARB THERAPY RXD/TAKEN: CPT | Mod: CPTII,S$GLB,, | Performed by: REGISTERED NURSE

## 2024-09-10 PROCEDURE — 1101F PT FALLS ASSESS-DOCD LE1/YR: CPT | Mod: CPTII,S$GLB,, | Performed by: REGISTERED NURSE

## 2024-09-10 PROCEDURE — 1159F MED LIST DOCD IN RCRD: CPT | Mod: CPTII,S$GLB,, | Performed by: REGISTERED NURSE

## 2024-09-10 PROCEDURE — 99204 OFFICE O/P NEW MOD 45 MIN: CPT | Mod: S$GLB,,, | Performed by: REGISTERED NURSE

## 2024-09-10 PROCEDURE — 1125F AMNT PAIN NOTED PAIN PRSNT: CPT | Mod: CPTII,S$GLB,, | Performed by: REGISTERED NURSE

## 2024-09-10 PROCEDURE — 72110 X-RAY EXAM L-2 SPINE 4/>VWS: CPT | Mod: 26,,, | Performed by: RADIOLOGY

## 2024-09-10 PROCEDURE — 3008F BODY MASS INDEX DOCD: CPT | Mod: CPTII,S$GLB,, | Performed by: REGISTERED NURSE

## 2024-09-10 RX ORDER — METHOCARBAMOL 500 MG/1
500 TABLET, FILM COATED ORAL 3 TIMES DAILY PRN
Qty: 60 TABLET | Refills: 2 | Status: SHIPPED | OUTPATIENT
Start: 2024-09-10 | End: 2024-11-09

## 2024-09-10 RX ORDER — GABAPENTIN 100 MG/1
100 CAPSULE ORAL NIGHTLY PRN
Qty: 30 CAPSULE | Refills: 5 | Status: SHIPPED | OUTPATIENT
Start: 2024-09-10 | End: 2025-09-10

## 2024-09-27 ENCOUNTER — OFFICE VISIT (OUTPATIENT)
Dept: PRIMARY CARE CLINIC | Facility: CLINIC | Age: 69
End: 2024-09-27
Payer: COMMERCIAL

## 2024-09-27 VITALS
WEIGHT: 91.69 LBS | OXYGEN SATURATION: 96 % | SYSTOLIC BLOOD PRESSURE: 136 MMHG | HEIGHT: 63 IN | BODY MASS INDEX: 16.25 KG/M2 | DIASTOLIC BLOOD PRESSURE: 82 MMHG | HEART RATE: 79 BPM | TEMPERATURE: 98 F

## 2024-09-27 DIAGNOSIS — M41.9 SCOLIOSIS, UNSPECIFIED SCOLIOSIS TYPE, UNSPECIFIED SPINAL REGION: ICD-10-CM

## 2024-09-27 DIAGNOSIS — M81.0 OSTEOPOROSIS, POSTMENOPAUSAL: ICD-10-CM

## 2024-09-27 DIAGNOSIS — H91.93 BILATERAL HEARING LOSS, UNSPECIFIED HEARING LOSS TYPE: ICD-10-CM

## 2024-09-27 DIAGNOSIS — Z00.8 ENCOUNTER FOR WORK CAPABILITY ASSESSMENT: Primary | ICD-10-CM

## 2024-09-27 PROCEDURE — 4010F ACE/ARB THERAPY RXD/TAKEN: CPT | Mod: CPTII,S$GLB,,

## 2024-09-27 PROCEDURE — 1159F MED LIST DOCD IN RCRD: CPT | Mod: CPTII,S$GLB,,

## 2024-09-27 PROCEDURE — 99213 OFFICE O/P EST LOW 20 MIN: CPT | Mod: S$GLB,,,

## 2024-09-27 PROCEDURE — 3075F SYST BP GE 130 - 139MM HG: CPT | Mod: CPTII,S$GLB,,

## 2024-09-27 PROCEDURE — 3008F BODY MASS INDEX DOCD: CPT | Mod: CPTII,S$GLB,,

## 2024-09-27 PROCEDURE — 99999 PR PBB SHADOW E&M-EST. PATIENT-LVL IV: CPT | Mod: PBBFAC,,,

## 2024-09-27 PROCEDURE — 3288F FALL RISK ASSESSMENT DOCD: CPT | Mod: CPTII,S$GLB,,

## 2024-09-27 PROCEDURE — 1101F PT FALLS ASSESS-DOCD LE1/YR: CPT | Mod: CPTII,S$GLB,,

## 2024-09-27 PROCEDURE — 3079F DIAST BP 80-89 MM HG: CPT | Mod: CPTII,S$GLB,,

## 2024-09-27 PROCEDURE — 1126F AMNT PAIN NOTED NONE PRSNT: CPT | Mod: CPTII,S$GLB,,

## 2024-09-27 NOTE — PROGRESS NOTES
"[facility] Note    Chief Complaint    No chief complaint on file.      History of Present Illness      Cathy Reynolds is a 69 y.o. female patient of Dr. Marin who presents today for physical exam for work.  Pt works in a school, [employer]      Paperwork filled out and questions asked, responses documented.    Document stated they require pt to be able to hear at a normal level. Performed whisper test.  Pt did not pass whisper test. Pt stated she "can hear the kids screaming".  Pt also stated she has been evaluated by an outside audiologist and she needs hearing aids.  Signed papers with the contention that she will get the proper hearing aids to be able to hear the children, not only when they are loud, but when they are in need of help and maybe cannot scream for help.    ROS and PE performed.    Past Medical History:  Past Medical History:   Diagnosis Date    AR (allergic rhinitis)     Breast cancer     RIGHT    CHF (congestive heart failure)     Diverticulosis     Ear pain, right     Elevated LFTs     borderline - due to OTC herbals - resolved    History of colon polyps     Hyperlipidemia     borderline with high HDL    Hypertension     Mild anxiety     Osteoporosis, postmenopausal     Postmenopausal status     Ringing in ear, right     Underweight     Vitamin D deficiency        Past Surgical History:  Past Surgical History:   Procedure Laterality Date    AXILLARY NODE DISSECTION Right 2021    Procedure: LYMPHADENECTOMY, AXILLARY;  Surgeon: Kaylynn Dickinson MD;  Location: [facility];  Service: General;  Laterality: Right;  RN PREOP 21---COVID ON 9/10-NEGATIVE--HAS CARDS CLEARANCE---    BREAST BIOPSY Right      SECTION, LOW TRANSVERSE      COLONOSCOPY      COLONOSCOPY N/A 2020    Procedure: COLONOSCOPY;  Surgeon: STEFFANIE Gordon MD;  Location: [address]);  Service: Endoscopy;  Laterality: N/A;  COVID test on 20 at [facility] urgent -     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    INJECTION FOR SENTINEL NODE IDENTIFICATION Right 09/13/2021    Procedure: INJECTION, FOR SENTINEL NODE IDENTIFICATION;  Surgeon: Kaylynn Dickinson MD;  Location: [facility] OR;  Service: General;  Laterality: Right;    MASTECTOMY Right 2021    DCIS    MYOMECTOMY      polyp removal from cervix      SENTINEL LYMPH NODE BIOPSY Right 09/13/2021    Procedure: BIOPSY, LYMPH NODE, SENTINEL;  Surgeon: Kaylynn Dickinson MD;  Location: [facility] OR;  Service: General;  Laterality: Right;    UNILATERAL MASTECTOMY Right 09/13/2021    Procedure: MASTECTOMY, UNILATERAL;  Surgeon: Kaylynn Dickinson MD;  Location: [facility] OR;  Service: General;  Laterality: Right;  NEED-CONSENT, H/P, ORDERS       Family History:  family history includes Breast cancer in her maternal aunt; Diabetes in her sister; Heart attack in her brother; Hypertension in her brother, mother, and sister.     Social History:  Social History     Socioeconomic History    Marital status:     Number of children: 2   Tobacco Use    Smoking status: Never    Smokeless tobacco: Never   Substance and Sexual Activity    Alcohol use: No    Drug use: No    Sexual activity: Yes     Partners: Male     Birth control/protection: None   Social History Narrative    Teacher at [employer]           Review of Systems   Constitutional:  Negative for chills, diaphoresis, fever, malaise/fatigue and weight loss.   HENT:  Negative for congestion, ear pain, sinus pain and sore throat.    Eyes:  Negative for blurred vision and photophobia.   Respiratory:  Negative for cough, shortness of breath and wheezing.    Cardiovascular:  Negative for chest pain, palpitations and leg swelling.   Gastrointestinal:  Negative for abdominal pain, constipation, diarrhea, nausea and vomiting.   Musculoskeletal:  Negative for joint pain and myalgias.   Skin:  Negative for rash.   Neurological:  Negative for dizziness, tingling, weakness and headaches.         Medications:  Outpatient Encounter Medications as of 9/27/2024   Medication Sig Note Dispense Refill    cholecalciferol, vitamin D3, (VITAMIN D3) 50 mcg (2,000 unit) Cap Take 2 capsules by mouth once daily.        clotrimazole-betamethasone 1-0.05% (LOTRISONE) cream Apply topically 2 (two) times daily. 11/8/2023: prn      cyanocobalamin (VITAMIN B-12) 100 MCG tablet Take 100 mcg by mouth daily as needed.       fluticasone propionate (FLONASE) 50 mcg/actuation nasal spray SHAKE LIQUID AND USE 1 SPRAY IN EACH NOSTRIL TWICE DAILY  48 g 1    folic acid/multivit-min/lutein (CENTRUM SILVER ORAL) Take by mouth.       gabapentin (NEURONTIN) 100 MG capsule Take 1 capsule (100 mg total) by mouth nightly as needed (muscel tension).  30 capsule 5    methocarbamoL (ROBAXIN) 500 MG Tab Take 1 tablet (500 mg total) by mouth 3 (three) times daily as needed (muscle tension).  60 tablet 2    olmesartan (BENICAR) 5 MG Tab TAKE 1 TABLET(5 MG) BY MOUTH DAILY  90 tablet 3     No facility-administered encounter medications on file as of 9/27/2024.       Allergies:  Review of patient's allergies indicates:   Allergen Reactions    Ace inhibitors      Other reaction(s): cough    Diltiazem Other (See Comments)     - rash, lip numb, weak    Norvasc [amlodipine]      MUSCLE TWITCHING     Penicillins Hives       Health Maintenance:  Health Maintenance   Topic Date Due    DEXA Scan  07/12/2024    Mammogram  04/15/2025    Colorectal Cancer Screening  09/08/2025    Lipid Panel  02/27/2026    TETANUS VACCINE  07/17/2029    Hepatitis C Screening  Completed    Shingles Vaccine  Completed     Health Maintenance Topics with due status: Not Due       Topic Last Completion Date    TETANUS VACCINE 07/17/2019    Colorectal Cancer Screening 09/08/2020    Lipid Panel 02/27/2021    Mammogram 04/15/2024       Physical Exam      Vital Signs  Temp: 98.3 °F (36.8 °C)  Temp Source: Oral  Pulse: 79  SpO2: 96 %  BP: 136/82  BP Location: Left arm  Patient  "Position: Sitting  Pain Score: 0-No pain  Height and Weight  Height: 5' 3" (160 cm)  Weight: 41.6 kg (91 lb 11.4 oz)  BSA (Calculated - sq m): 1.36 sq meters  BMI (Calculated): 16.3  Weight in (lb) to have BMI = 25: 140.8]    Physical Exam  Vitals and nursing note reviewed.   Constitutional:       Appearance: Normal appearance.   HENT:      Head: Normocephalic and atraumatic.   Cardiovascular:      Rate and Rhythm: Normal rate and regular rhythm.      Pulses: Normal pulses.           Radial pulses are 2+ on the right side and 2+ on the left side.        Dorsalis pedis pulses are 2+ on the right side and 2+ on the left side.        Posterior tibial pulses are 2+ on the right side and 2+ on the left side.      Heart sounds: Normal heart sounds.   Pulmonary:      Effort: Pulmonary effort is normal.      Breath sounds: Normal breath sounds.   Musculoskeletal:      Thoracic back: Scoliosis present.      Right lower leg: No edema.      Left lower leg: No edema.   Skin:     General: Skin is warm and dry.      Capillary Refill: Capillary refill takes less than 2 seconds.   Neurological:      General: No focal deficit present.      Mental Status: She is alert and oriented to person, place, and time.   Psychiatric:         Behavior: Behavior normal.          Laboratory:  CBC:  Recent Labs   Lab 03/19/22  0814 11/12/22  0623   WBC 5.73 5.34   RBC 4.89 4.75   Hemoglobin 12.8 12.8   Hematocrit 43.6 41.7   Platelets 216 219   MCV 89 88   MCH 26.2 L 26.9 L   MCHC 29.4 L 30.7 L     CMP:  Recent Labs   Lab 03/19/22  0814 11/12/22  0623 06/09/23  0956   Glucose 93 116 H  --    Calcium 9.5 10.0 10.5 H   Albumin 4.2  --  5.0   Total Protein 7.6  --   --    Sodium 142 140 139   Potassium 4.5 4.2 4.4   CO2 30 H 28  --    Carbon Dioxide  --   --  31   Chloride 102 101  --    BUN 18 15 28.0 H   Alkaline Phosphatase 48 L  --   --    ALT 35  --  23   AST 35  --  33   Total Bilirubin 0.5  --  0.4     URINALYSIS:       LIPIDS:      TSH:    "   A1C:        Radiology:        Assessment/Plan     Cathy Reynolds is a 69 y.o.female who presents for work physical.  Assessment as stated above.    1. Encounter for work capability assessment    2. Scoliosis, unspecified scoliosis type, unspecified spinal region    3. Osteoporosis, postmenopausal  -Please have bone scan done before returning to work.    Orders:  -     DXA Bone Density Axial Skeleton 1 or more sites; Future; Expected date: 09/27/2024    4. Bilateral hearing loss, unspecified hearing loss type  -follow up with audiology for hearing aids    As above, continue current medications and maintain follow up with specialists.  Return to clinic as needed.    I spent 42 minutes on the day of this encounter for preparing, evaluating, treating, and managing this patient.          Ruth Thomson, MSN, APRN, FNP-C  [facility]      Portions of this note may have been generated using voice recognition software.  Please excuse any spelling/grammatical errors. Occasional wrong-word or sound-a-like substitutions may have also occurred due to the inherent limitations of voice recognition software. Please read the chart carefully and recognize, using context, where substitutions have occurred.

## 2024-09-30 NOTE — PROGRESS NOTES
"[facility] Note    Chief Complaint    No chief complaint on file.      History of Present Illness      Cathy Reynolds is a 69 y.o. female patient of Dr. Marin who presents today for physical exam for work.  Pt works in a school, [employer].      Paperwork filled out and questions asked, responses documented.    Document stated they require pt to be able to hear at a normal level. Performed whisper test.  Pt did not pass whisper test. Pt stated she "can hear the kids screaming".  Pt also stated she has been evaluated by an outside audiologist and she needs hearing aids.  Signed papers with the contention that she will get the proper hearing aids to be able to hear the children, not only when they are loud, but when they are in need of help and maybe cannot scream for help.    ROS and PE performed.    Past Medical History:  Past Medical History:   Diagnosis Date    AR (allergic rhinitis)     Breast cancer     RIGHT    CHF (congestive heart failure)     Diverticulosis     Ear pain, right     Elevated LFTs     borderline - due to OTC herbals - resolved    History of colon polyps     Hyperlipidemia     borderline with high HDL    Hypertension     Mild anxiety     Osteoporosis, postmenopausal     Postmenopausal status     Ringing in ear, right     Underweight     Vitamin D deficiency        Past Surgical History:  Past Surgical History:   Procedure Laterality Date    AXILLARY NODE DISSECTION Right 2021    Procedure: LYMPHADENECTOMY, AXILLARY;  Surgeon: Kaylynn Dickinson MD;  Location: [facility];  Service: General;  Laterality: Right;  RN PREOP 21---COVID ON 9/10-NEGATIVE--HAS CARDS CLEARANCE---    BREAST BIOPSY Right      SECTION, LOW TRANSVERSE      COLONOSCOPY      COLONOSCOPY N/A 2020    Procedure: COLONOSCOPY;  Surgeon: STEFFANIE Gordon MD;  Location: [address]);  Service: Endoscopy;  Laterality: N/A;  COVID test on 20 at [facility] urgent -     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    INJECTION FOR SENTINEL NODE IDENTIFICATION Right 09/13/2021    Procedure: INJECTION, FOR SENTINEL NODE IDENTIFICATION;  Surgeon: Kaylynn Dickinson MD;  Location: [facility] OR;  Service: General;  Laterality: Right;    MASTECTOMY Right 2021    DCIS    MYOMECTOMY      polyp removal from cervix      SENTINEL LYMPH NODE BIOPSY Right 09/13/2021    Procedure: BIOPSY, LYMPH NODE, SENTINEL;  Surgeon: Kaylynn Dickinson MD;  Location: [facility] OR;  Service: General;  Laterality: Right;    UNILATERAL MASTECTOMY Right 09/13/2021    Procedure: MASTECTOMY, UNILATERAL;  Surgeon: Kaylynn Dickinson MD;  Location: [facility] OR;  Service: General;  Laterality: Right;  NEED-CONSENT, H/P, ORDERS       Family History:  family history includes Breast cancer in her maternal aunt; Diabetes in her sister; Heart attack in her brother; Hypertension in her brother, mother, and sister.     Social History:  Social History     Socioeconomic History    Marital status:     Number of children: 2   Tobacco Use    Smoking status: Never    Smokeless tobacco: Never   Substance and Sexual Activity    Alcohol use: No    Drug use: No    Sexual activity: Yes     Partners: Male     Birth control/protection: None   Social History Narrative    Teacher at [employer]           Review of Systems   Constitutional:  Negative for chills, diaphoresis, fever, malaise/fatigue and weight loss.   HENT:  Negative for congestion, ear pain, sinus pain and sore throat.    Eyes:  Negative for blurred vision and photophobia.   Respiratory:  Negative for cough, shortness of breath and wheezing.    Cardiovascular:  Negative for chest pain, palpitations and leg swelling.   Gastrointestinal:  Negative for abdominal pain, constipation, diarrhea, nausea and vomiting.   Musculoskeletal:  Negative for joint pain and myalgias.   Skin:  Negative for rash.   Neurological:  Negative for dizziness, tingling, weakness and headaches.         Medications:  Outpatient Encounter Medications as of 9/27/2024   Medication Sig Note Dispense Refill    cholecalciferol, vitamin D3, (VITAMIN D3) 50 mcg (2,000 unit) Cap Take 2 capsules by mouth once daily.        clotrimazole-betamethasone 1-0.05% (LOTRISONE) cream Apply topically 2 (two) times daily. 11/8/2023: prn      cyanocobalamin (VITAMIN B-12) 100 MCG tablet Take 100 mcg by mouth daily as needed.       fluticasone propionate (FLONASE) 50 mcg/actuation nasal spray SHAKE LIQUID AND USE 1 SPRAY IN EACH NOSTRIL TWICE DAILY  48 g 1    folic acid/multivit-min/lutein (CENTRUM SILVER ORAL) Take by mouth.       gabapentin (NEURONTIN) 100 MG capsule Take 1 capsule (100 mg total) by mouth nightly as needed (muscel tension).  30 capsule 5    methocarbamoL (ROBAXIN) 500 MG Tab Take 1 tablet (500 mg total) by mouth 3 (three) times daily as needed (muscle tension).  60 tablet 2    olmesartan (BENICAR) 5 MG Tab TAKE 1 TABLET(5 MG) BY MOUTH DAILY  90 tablet 3     No facility-administered encounter medications on file as of 9/27/2024.       Allergies:  Review of patient's allergies indicates:   Allergen Reactions    Ace inhibitors      Other reaction(s): cough    Diltiazem Other (See Comments)     - rash, lip numb, weak    Norvasc [amlodipine]      MUSCLE TWITCHING     Penicillins Hives       Health Maintenance:  Health Maintenance   Topic Date Due    DEXA Scan  07/12/2024    Mammogram  04/15/2025    Colorectal Cancer Screening  09/08/2025    Lipid Panel  02/27/2026    TETANUS VACCINE  07/17/2029    Hepatitis C Screening  Completed    Shingles Vaccine  Completed     Health Maintenance Topics with due status: Not Due       Topic Last Completion Date    TETANUS VACCINE 07/17/2019    Colorectal Cancer Screening 09/08/2020    Lipid Panel 02/27/2021    Mammogram 04/15/2024       Physical Exam      Vital Signs  Temp: 98.3 °F (36.8 °C)  Temp Source: Oral  Pulse: 79  SpO2: 96 %  BP: 136/82  BP Location: Left arm  Patient  "Position: Sitting  Pain Score: 0-No pain  Height and Weight  Height: 5' 3" (160 cm)  Weight: 41.6 kg (91 lb 11.4 oz)  BSA (Calculated - sq m): 1.36 sq meters  BMI (Calculated): 16.3  Weight in (lb) to have BMI = 25: 140.8]    Physical Exam  Vitals and nursing note reviewed.   Constitutional:       Appearance: Normal appearance.   HENT:      Head: Normocephalic and atraumatic.   Cardiovascular:      Rate and Rhythm: Normal rate and regular rhythm.      Pulses: Normal pulses.           Radial pulses are 2+ on the right side and 2+ on the left side.        Dorsalis pedis pulses are 2+ on the right side and 2+ on the left side.        Posterior tibial pulses are 2+ on the right side and 2+ on the left side.      Heart sounds: Normal heart sounds.   Pulmonary:      Effort: Pulmonary effort is normal.      Breath sounds: Normal breath sounds.   Musculoskeletal:      Thoracic back: Scoliosis present.      Right lower leg: No edema.      Left lower leg: No edema.   Skin:     General: Skin is warm and dry.      Capillary Refill: Capillary refill takes less than 2 seconds.   Neurological:      General: No focal deficit present.      Mental Status: She is alert and oriented to person, place, and time.   Psychiatric:         Behavior: Behavior normal.          Laboratory:  CBC:  Recent Labs   Lab 03/19/22  0814 11/12/22  0623   WBC 5.73 5.34   RBC 4.89 4.75   Hemoglobin 12.8 12.8   Hematocrit 43.6 41.7   Platelets 216 219   MCV 89 88   MCH 26.2 L 26.9 L   MCHC 29.4 L 30.7 L     CMP:  Recent Labs   Lab 03/19/22  0814 11/12/22  0623 06/09/23  0956   Glucose 93 116 H  --    Calcium 9.5 10.0 10.5 H   Albumin 4.2  --  5.0   Total Protein 7.6  --   --    Sodium 142 140 139   Potassium 4.5 4.2 4.4   CO2 30 H 28  --    Carbon Dioxide  --   --  31   Chloride 102 101  --    BUN 18 15 28.0 H   Alkaline Phosphatase 48 L  --   --    ALT 35  --  23   AST 35  --  33   Total Bilirubin 0.5  --  0.4     URINALYSIS:       LIPIDS:      TSH:    "   A1C:        Radiology:        Assessment/Plan     Catyh Reynolds is a 69 y.o.female who presents for work physical.  Assessment as stated above.    1. Encounter for work capability assessment    2. Scoliosis, unspecified scoliosis type, unspecified spinal region    3. Osteoporosis, postmenopausal  -Please have bone scan done before returning to work.    Orders:  -     DXA Bone Density Axial Skeleton 1 or more sites; Future; Expected date: 09/27/2024    4. Bilateral hearing loss, unspecified hearing loss type  -follow up with audiology for hearing aids    As above, continue current medications and maintain follow up with specialists.  Return to clinic as needed.    I spent 42 minutes on the day of this encounter for preparing, evaluating, treating, and managing this patient.          Ruth Thomson, MSN, APRN, FNP-C  [facility]      Portions of this note may have been generated using voice recognition software.  Please excuse any spelling/grammatical errors. Occasional wrong-word or sound-a-like substitutions may have also occurred due to the inherent limitations of voice recognition software. Please read the chart carefully and recognize, using context, where substitutions have occurred.

## 2024-10-04 ENCOUNTER — HOSPITAL ENCOUNTER (OUTPATIENT)
Dept: RADIOLOGY | Facility: OTHER | Age: 69
Discharge: HOME | End: 2024-10-04
Payer: COMMERCIAL

## 2024-10-04 DIAGNOSIS — M81.0 OSTEOPOROSIS, POSTMENOPAUSAL: ICD-10-CM

## 2024-10-04 PROCEDURE — 77080 DXA BONE DENSITY AXIAL: CPT | Mod: 26,,, | Performed by: RADIOLOGY

## 2024-10-04 PROCEDURE — 77080 DXA BONE DENSITY AXIAL: CPT | Mod: TC

## 2024-10-15 ENCOUNTER — CLINICAL SUPPORT (OUTPATIENT)
Dept: REHABILITATION | Facility: OTHER | Age: 69
End: 2024-10-15
Payer: COMMERCIAL

## 2024-10-15 DIAGNOSIS — M54.16 LUMBAR RADICULOPATHY: ICD-10-CM

## 2024-10-15 DIAGNOSIS — M41.115 JUVENILE IDIOPATHIC SCOLIOSIS OF THORACOLUMBAR REGION: ICD-10-CM

## 2024-10-15 DIAGNOSIS — R29.898 DECREASED STRENGTH OF TRUNK AND BACK: Primary | ICD-10-CM

## 2024-10-15 PROCEDURE — 97530 THERAPEUTIC ACTIVITIES: CPT

## 2024-10-15 PROCEDURE — 97161 PT EVAL LOW COMPLEX 20 MIN: CPT

## 2024-10-15 PROCEDURE — 97110 THERAPEUTIC EXERCISES: CPT

## 2024-10-15 NOTE — PROGRESS NOTES
[facility] OUTPATIENT THERAPY AND WELLNESS  Physical Therapy Initial Evaluation  Date: 10/15/2024   [facility]  Cl[facility] Number: [id number]    Therapy Diagnosis:   Encounter Diagnoses   Name Primary?    Lumbar radiculopathy     Juvenile idiopathic scoliosis of thoracolumbar region     Decreased strength of trunk and back Yes     Physician: RUCHI Simms NP    Physician Orders: PT Eval and Treat   Medical Diagnosis from Referral:   M54.16 (ICD-10-CM) - Lumbar radiculopathy   M41.115 (ICD-10-CM) - Juvenile idiopathic scoliosis of thoracolumbar region   Evaluation Date: 10/15/2024  Authorization Period Expiration: 12/31/24  Plan of Care Expiration: 12/24/24  Visit # / Visits authorized: 1/ 1   Progress Note Due: 11/15/24  FOTO: 1/ 3    Precautions:  hx of cancer    Time In: 4:00 pm  Time Out: 4:45 pm  Total Appointment Time (timed & untimed codes): 45 minutes    Subjective   Date of onset: couple of months, but has happened in the past (not as intense)  History of current condition - Cathy reports: that she has been having lower back and sciatic nerve symptoms down down the left leg- down the back of her leg into her foot. Patient reports that she has had some issues with this same issues in the past, but this last issue has been going on for months. Patient reports that she is 50% of her normal.    REYNALDO: insidious  Any Bowel and Bladder movement issues: none  Any falls: none  Any dizziness: none  Any Headache: none  Any injection in lower back: steroid or epidural: yes, last year with minimal relief  Pain radiates: yes down left leg  Pain constant or intermitting: intermittent    Pain:  Current 3/10, worst 6/10, best 2/10   Location: left back/down left leg  Description: Aching, Grabbing, and Tight  Aggravating Factors: lifting kids, walking too much, sitting too long (more than 2 hours), and standing for long period of time  Easing Factors:  ice to lower back and piriformis stretch    Prior Therapy:  none  Social History:  lives with their family  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  The L3/L4 disc space is narrowed.  There is a few mm L3/L4 retrolisthesis which can  be also related to the degree of scoliosis.  No acute fracture, or bone destruction identified    Medical History:   Past Medical History:   Diagnosis Date    AR (allergic rhinitis)     Breast cancer     RIGHT    CHF (congestive heart failure)     Diverticulosis     Ear pain, right     Elevated LFTs     borderline - due to OTC herbals - resolved    History of colon polyps     Hyperlipidemia     borderline with high HDL    Hypertension     Mild anxiety     Osteoporosis, postmenopausal     Postmenopausal status     Ringing in ear, right     Underweight     Vitamin D deficiency      Surgical History:   Cathy Reynolds  has a past surgical history that includes polyp removal from cervix;  section, low transverse; Myomectomy; Colonoscopy; 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    Medications:   Cathy has a current medication list which includes the following prescription(s): cholecalciferol (vitamin d3), clotrimazole-betamethasone 1-0.05%, cyanocobalamin, fluticasone propionate, folic acid/multivit-min/lutein, gabapentin, methocarbamol, and olmesartan.    Allergies:   Review of patient's allergies indicates:   Allergen Reactions    Ace inhibitors      Other reaction(s): cough     Diltiazem Other (See Comments)     - rash, lip numb, weak    Norvasc [amlodipine]      MUSCLE TWITCHING     Penicillins Hives      Objective     Posture Alignment: forward head;increased kyphosis;decreased lordosis    Sensation: {AMB PT KNEE SENSATION:24823}    DTR: {AMB PT KNEE SENSATION:51568}    GAIT DEVIATIONS: Cathy displays {AMB PT VESTIBULAR GAIT DEVIATIONS:15698};{AMB PT VESTIBULAR GAIT DEVIATIONS:65323};{AMB PT VESTIBULAR GAIT DEVIATIONS:91457};{AMB PT VESTIBULAR GAIT DEVIATIONS:03659}    Lumbar Range of Motion:    %   Flexion ***     Extension ***     Left Side Bending ***   Right Side Bending ***   Left rotation   ***   Right Rotation   ***    *= pain    Hip ROM in percentage:     Left Right   IR *** ***   ER *** ***   FL *** ***   EX *** ***   *= pain    Lower Extremity Strength    Right LE  Left LE    Hip flexion: {AMB PT VESTIBULAR STRENGTH:39108} Hip flexion: {AMB PT VESTIBULAR STRENGTH:66362}   Knee extension: {AMB PT VESTIBULAR STRENGTH:08972} Knee extension: {AMB PT VESTIBULAR STRENGTH:99170}   Knee flexion: {AMB PT VESTIBULAR STRENGTH:42058} Knee flexion: {AMB PT VESTIBULAR STRENGTH:88640}   Hip IR: {AMB PT VESTIBULAR STRENGTH:83836} Hip IR: {AMB PT VESTIBULAR STRENGTH:62829}   Hip ER: {AMB PT VESTIBULAR STRENGTH:30798} Hip ER: {AMB PT VESTIBULAR STRENGTH:63468}   Hip extension:  {AMB PT VESTIBULAR STRENGTH:05977} Hip extension: {AMB PT VESTIBULAR STRENGTH:13514}   Hip abduction: {AMB PT VESTIBULAR STRENGTH:04721} Hip abduction: {AMB PT VESTIBULAR STRENGTH:58136}   Hip adduction: {AMB PT VESTIBULAR STRENGTH:13766} Hip adduction {AMB PT VESTIBULAR STRENGTH:95682}   Ankle dorsiflexion: {AMB PT VESTIBULAR STRENGTH:82268} Ankle dorsiflexion: {AMB PT VESTIBULAR STRENGTH:19072}   Ankle plantarflexion: {AMB PT VESTIBULAR STRENGTH:96721} Ankle plantarflexion: {AMB PT VESTIBULAR STRENGTH:98067}     Special Tests:  -Quadrant testing: {POSITIVE/NEGATIVE:47846}  -JENNIFFER: {POSITIVE/NEGATIVE:02544}  -Scour:  {POSITIVE/NEGATIVE:09513}  -Repeated Flexion: {POSITIVE/NEGATIVE:83072}  -Repeated Ext:{POSITIVE/NEGATIVE:14834}  -Prone Instability Test: {POSITIVE/NEGATIVE:39876}  -Bridge Test: {POSITIVE/NEGATIVE:42106}  -Sustained extension: {POSITIVE/NEGATIVE:06900}  -Heel walking: {POSITIVE/NEGATIVE:06378}  -Toe walking: {POSITIVE/NEGATIVE:42973}  -Hip extension movement pattern: {POSITIVE/NEGATIVE:34442}    SI provocation testing: ***    Neuro Dynamic Testing:    Sciatic nerve:      SLR: {POSITIVE/NEGATIVE:12403}   Femoral Nerve:    Femoral nerve test: {POSITIVE/NEGATIVE:55863}   Neural Tension:     Slump test: {POSITIVE/NEGATIVE:67398}    Joint Mobility: ***    Palpation: ***    Flexibility:   Hamstring: R = *** degrees ; L = *** degrees   Prone knee FL R = *** ; L = ***   Josse test: R = *** ; L = ***    PT Evaluation Completed? Yes  Discussed Plan of Care with patient: Yes    CMS Impairment/Limitation/Restriction for FOTO Lumbar Survey    Therapist reviewed FOTO scores for Cathy Reynolds on 10/15/2024.   FOTO documents entered into [employer] - see Media section.    Limitation Score: ***    Goal Score: ***     TREATMENT   Treatment Time In: ***  Treatment Time Out: ***  Total Treatment time separate from Evaluation: *** minutes    Next visit: HEP review (***    Cathy received therapeutic exercises to develop {AMB PT PROGRESS OBJECTIVE:53233} for *** minutes including:  ***    Cathy received the following manual therapy techniques: {AMB PT PROGRESS MANUAL THERAPY:04486} were applied to the: *** for *** minutes, including:  ***    Cathy participated in dynamic functional therapeutic activities to improve functional performance for ***  minutes, including:  ***    Cathy received *** hot or cold pack for *** minutes to ***.    Home Exercises and Patient Education Provided    Education provided:   - See above    Written Home Exercises Provided: yes.  Exercises were reviewed and Cathy was able to demonstrate  them prior to the end of the session.  Cathy demonstrated {Desc; good/fair/poor:52490} understanding of the education provided.     See EMR under Patient Instructions for exercises provided 10/15/2024.    Assessment   Cathy is a 69 y.o. female referred to outpatient Physical Therapy with a medical diagnosis of M54.16- lumbar radiculopathy and M41.115- juvenile idiopathic scoliosis of thoracolumbar region. Pt presents with ***    Pt prognosis is {REHAB PROGNOSIS OHS:09998}.   Pt will benefit from skilled outpatient Physical Therapy to address the deficits stated above and in the chart below, provide pt/family education, and to maximize pt's level of independence.     Plan of care discussed with patient: Yes  Pt's spiritual, cultural and educational needs considered and patient is agreeable to the plan of care and goals as stated below:     Anticipated Barriers for therapy: ***    Medical Necessity is demonstrated by the following  History  Co-morbidities and personal factors that may impact the plan of care Co-morbidities:   CHF and HTN, hx of cancer, and anxiety    Personal Factors:   {Personal Factors:06128}     low   Examination  Body Structures and Functions, activity limitations and participation restrictions that may impact the plan of care Body Regions:   {Body Regions:70708}    Body Systems:    {Body Systems:07595}    Participation Restrictions:   *** ADLs, IADLs, and wanted activities    Activity limitations:   Learning and applying knowledge  {Learning and applying knowledge:37580}    General Tasks and Commands  {Gen tasks and commands:91024}    Communication  {Communication:06176}    Mobility  {Mobility:78751}    Self care  {Self Care:96888}    Domestic Life  {Domestic Life:59942}    Interactions/Relationships  no deficits ***    Life Areas  {Life Areas:82923}    Community and Social Life  community life  recreation and leisure         Complexity: low  See FOTO outcome assessment   Clinical  "Presentation {Clinical Presentation :59751} low   Decision Making/ Complexity Score: low     GOALS: Short Term Goals:  4-5 weeks  1. Report decreased in pain at worse less than  <   / =  ***  /10  to increase tolerance for functional activities.On going  2. Increased Hip/LE MMT 1/2  to increase tolerance for ADL and work activities.On going  3. Pt to tolerate HEP to improve ROM and independence with ADL's.On going  6. Pt to improve range of motion by 50% to allow for improved functional mobility to allow for improvement in IADLs. On going    Long Term Goals: 8-10 weeks  1. Report decreased in pain at worse less than  <   / =  ***  /10  to increase tolerance for functional mobility.  On going  2. Increased B hip/LE MMT 1 grade to increase tolerance for ADL and work activities.On going  3. Pt will report a *** or greater score on FOTO Lumbar spine survey  to demonstrate decrease in disability and improvement in back pain.On going  4. Pt to be Independent with HEP to improve ROM and independence with ADL's. On going  5. Pt to demonstrate negative Bridge Test in order to show improved core strength for lumbar stabilization. On going  6. Pt to improve range of motion by 90% to allow for improved functional mobility to allow for improvement in IADLs. On going    Plan   Plan of care Certification: 10/15/2024 to ***.    Outpatient Physical Therapy {NUMBERS 1-5:09326} times weekly for {0-10:42298::"0"} weeks to include the following interventions: {TX PLAN:58623}. Dry needling    Rj Jefferson, PT      I CERTIFY THE NEED FOR THESE SERVICES FURNISHED UNDER THIS PLAN OF TREATMENT AND WHILE UNDER MY CARE   Physician's comments:     Physician's Signature: ___________________________________________________     " .

## 2024-10-16 PROBLEM — R29.898 DECREASED STRENGTH OF TRUNK AND BACK: Status: ACTIVE | Noted: 2024-10-16

## 2024-10-16 NOTE — PLAN OF CARE
[facility]  Physical Therapy Initial Evaluation  Date: 10/15/2024   [facility]  [id number]    Therapy Diagnosis:   Encounter Diagnoses   Name Primary?    Lumbar radiculopathy     Juvenile idiopathic scoliosis of thoracolumbar region     Decreased strength of trunk and back Yes     Physician: RUCHI Simms NP    Physician Orders: PT Eval and Treat   Medical Diagnosis from Referral:   M54.16 (ICD-10-CM) - Lumbar radiculopathy   M41.115 (ICD-10-CM) - Juvenile idiopathic scoliosis of thoracolumbar region   Evaluation Date: 10/15/2024  Authorization Period Expiration: 12/31/24  Plan of Care Expiration: 12/24/24  Visit # / Visits authorized: 1/ 1   Progress Note Due: 11/15/24  FOTO: 1/ 3    Precautions: hx of cancer    Time In: 4:00 pm  Time Out: 4:45 pm  Total Appointment Time (timed & untimed codes): 45 minutes    Subjective   Date of onset: couple of months, but has happened in the past (not as intense)  History of current condition - Cathy reports: that she has been having lower back and sciatic nerve symptoms down the left leg- down the back of her leg into her foot. Patient reports that she has had some issues with this same issues in the past, but this last issue has been going on for months. Patient reports that she is 50% of her normal.    REYNALDO: insidious  Any Bowel and Bladder movement issues: none  Any falls: none  Any dizziness: none  Any Headache: none  Any injection in lower back: steroid or epidural: yes, last year with minimal relief  Pain radiates: yes down left leg  Pain constant or intermitting: intermittent    Pain:  Current 3/10, worst 6/10, best 2/10   Location: left back/down left leg  Description: Aching, Grabbing, and Tight  Aggravating Factors: lifting kids, walking too much, sitting too long (more than 2 hours), and standing for long period of time  Easing Factors: ice to lower back and piriformis stretch    Prior Therapy:  none  Social History:  lives with their family  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  The L3/L4 disc space is narrowed.  There is a few mm L3/L4 retrolisthesis which can  be also related to the degree of scoliosis.  No acute fracture, or bone destruction identified    Medical History:   Past Medical History:   Diagnosis Date    AR (allergic rhinitis)     Breast cancer     RIGHT    CHF (congestive heart failure)     Diverticulosis     Ear pain, right     Elevated LFTs     borderline - due to OTC herbals - resolved    History of colon polyps     Hyperlipidemia     borderline with high HDL    Hypertension     Mild anxiety     Osteoporosis, postmenopausal     Postmenopausal status     Ringing in ear, right     Underweight     Vitamin D deficiency      Surgical History:   Cathy Reynolds  has a past surgical history that includes polyp removal from cervix;  section, low transverse; Myomectomy; Colonoscopy; 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    Medications:   Cathy has a current medication list which includes the following prescription(s): cholecalciferol (vitamin d3), clotrimazole-betamethasone 1-0.05%, cyanocobalamin, fluticasone propionate, folic acid/multivit-min/lutein, gabapentin, methocarbamol, and olmesartan.    Allergies:   Review of patient's allergies indicates:   Allergen Reactions    Ace inhibitors      Other reaction(s): cough     Diltiazem Other (See Comments)     - rash, lip numb, weak    Norvasc [amlodipine]      MUSCLE TWITCHING     Penicillins Hives      Objective     Posture Alignment: forward head;increased kyphosis;decreased lordosis    Sensation: intact to light touch    DTR: normal    GAIT DEVIATIONS: Cathy displays decreased weight shift;decreased step length    Lumbar Range of Motion:    %   Flexion 100     Extension 50     Left Side Bending 50   Right Side Bending 50   Left rotation   75   Right Rotation   75    *= pain    Hip ROM in percentage:     Left Right   IR WNL WNL   ER WNL WNL   FL WNL WNL   EX 75% 75%   *= pain    Lower Extremity Strength    Right LE  Left LE    Hip flexion: 4/5 Hip flexion: 4/5   Knee extension: 4+/5 Knee extension: 4+/5   Knee flexion: 4+/5 Knee flexion: 4+/5   Hip IR: 4/5 Hip IR: 4/5   Hip ER: 4/5 Hip ER: 4/5   Hip extension:  4-/5 Hip extension: 4-/5   Hip abduction: 4-/5 Hip abduction: 4-/5   Hip adduction: 4-/5 Hip adduction 4-/5   Ankle dorsiflexion: 4+/5 Ankle dorsiflexion: 4+/5   Ankle plantarflexion: 4+/5 Ankle plantarflexion: 4+/5     Special Tests:  -Quadrant testing: negative  -JENNIFFER: negative  -Scour: negative  -Repeated Flexion: negative  -Repeated Ext:negative  -Prone Instability Test: positive  -Bridge Test: positive  -Sustained extension: negative  -Heel walking: negative  -Toe walking: negative  -Hip extension movement pattern: positive    SI provocation testing: Not indicated    Neuro Dynamic Testing:    Sciatic nerve:      SLR: positive   Femoral Nerve:    Femoral nerve test: negative   Neural Tension:     Slump test: positive    Joint Mobility: hypomobile to lumbar spine and into B anterior hip joint    Palpation: TTP to lumbar paraspinals and to R piriformis    Flexibility:   Prone knee FL R = pos ; L = pos   Josse test: R = pos ; L = pos    PT Evaluation Completed? Yes  Discussed Plan of Care with patient: Yes    CMS Impairment/Limitation/Restriction for FOTO Lumbar  Survey    Therapist reviewed FOTO scores for Cathy Baroneriatt on 10/15/2024.   FOTO documents entered into [employer] - see Media section.    Limitation Score: 42    Goal Score: 58     TREATMENT     Total Treatment time separate from Evaluation: 25 minutes    Next visit: HEP review (bridge, LTR, and seated nerve glide); supine nerve glide, supine TA, supine TA + SLR, lifting mechanics, lumbar EX machine, sidelying clams, deadlifts, paloff press    Cathy received therapeutic exercises to develop strength, posture, and core stabilization for 10 minutes including:  HEP review of:  Bridge  LTR  Seated nerve glide    Cathy participated in dynamic functional therapeutic activities to improve functional performance for 15  minutes, including:  Rehab progression/potential  Lifting mechanics    Home Exercises and Patient Education Provided    Education provided:   - See above    Written Home Exercises Provided: yes.  Exercises were reviewed and Cathy was able to demonstrate them prior to the end of the session.  Cathy demonstrated good  understanding of the education provided.     See EMR under Patient Instructions for exercises provided 10/15/2024.    Assessment   Cathy is a 69 y.o. female referred to outpatient Physical Therapy with a medical diagnosis of M54.16- lumbar radiculopathy and M41.115- juvenile idiopathic scoliosis of thoracolumbar region. Pt presents with decreased joint mobility, increased pain/adverse symptoms, increased neural tension, decreased strength/motor control, and decreased tolerance to activity. Patient's symptoms are consistent with lumbar radiculopathy without positional preference and is in the stability lower back category. Will also benefit greatly from going over lifting mechanics to assist with increased pain at work.    Pt prognosis is Good.   Pt will benefit from skilled outpatient Physical Therapy to address the deficits stated above and in the chart below, provide  pt/family education, and to maximize pt's level of independence.     Plan of care discussed with patient: Yes  Pt's spiritual, cultural and educational needs considered and patient is agreeable to the plan of care and goals as stated below:     Anticipated Barriers for therapy: hesitant that therapy will help    Medical Necessity is demonstrated by the following  History  Co-morbidities and personal factors that may impact the plan of care Co-morbidities:   CHF and HTN, hx of cancer, and anxiety    Personal Factors:   attitudes     low   Examination  Body Structures and Functions, activity limitations and participation restrictions that may impact the plan of care Body Regions:   back  lower extremities    Body Systems:    ROM  strength  gait  transfers  transitions  motor control  motor learning    Participation Restrictions:   Work, ADLs, IADLs, and wanted activities    Activity limitations:   Learning and applying knowledge  no deficits    General Tasks and Commands  no deficits    Communication  no deficits    Mobility  lifting and carrying objects  walking  driving (bike, car, motorcycle)    Self care  no deficits    Domestic Life  shopping  cooking  doing house work (cleaning house, washing dishes, laundry)  assisting others    Interactions/Relationships  no deficits     Life Areas  no deficits    Community and Social Life  community life  recreation and leisure         Complexity: low  See FOTO outcome assessment   Clinical Presentation stable and uncomplicated low   Decision Making/ Complexity Score: low     GOALS: Short Term Goals:  4-5 weeks  1. Report decreased in pain at worse less than  <   / =  4  /10  to increase tolerance for functional activities.On going  2. Increased Hip/LE MMT 1/2  to increase tolerance for ADL and work activities.On going  3. Pt to tolerate HEP to improve ROM and independence with ADL's.On going  6. Pt to improve range of motion by 100% to allow for improved functional mobility  to allow for improvement in IADLs. On going    Long Term Goals: 8-10 weeks  1. Report decreased in pain at worse less than  <   / =  2  /10  to increase tolerance for functional mobility.  On going  2. Increased B hip/LE MMT 1 grade to increase tolerance for ADL and work activities.On going  3. Pt will report a 58 or greater score on FOTO Lumbar spine survey  to demonstrate decrease in disability and improvement in back pain.On going  4. Pt to be Independent with HEP to improve ROM and independence with ADL's. On going  5. Pt to demonstrate negative Bridge Test in order to show improved core strength for lumbar stabilization. On going  6. Pt will show proper lifting mechanics to allow for improvement in IADLs. On going    Plan   Plan of care Certification: 10/15/2024 to 12/24/24.    Outpatient Physical Therapy 1-2 times weekly for 10 weeks to include the following interventions: Electrical Stimulation TENS/IFC/NMES, Gait Training, Manual Therapy, Moist Heat/ Ice, Neuromuscular Re-ed, Self Care, Therapeutic Activities, and Therapeutic Exercise. Dry needling    Rj Jefferson, PT      I CERTIFY THE NEED FOR THESE SERVICES FURNISHED UNDER THIS PLAN OF TREATMENT AND WHILE UNDER MY CARE   Physician's comments:     Physician's Signature: ___________________________________________________

## 2024-10-21 ENCOUNTER — NURSE TRIAGE (OUTPATIENT)
Dept: ADMINISTRATIVE | Facility: CLINIC | Age: 69
End: 2024-10-21
Payer: COMMERCIAL

## 2024-10-21 ENCOUNTER — PATIENT MESSAGE (OUTPATIENT)
Dept: PRIMARY CARE CLINIC | Facility: CLINIC | Age: 69
End: 2024-10-21
Payer: COMMERCIAL

## 2024-10-21 NOTE — TELEPHONE ENCOUNTER
Pt is calling about getting an appt with PCP. Pt said that she was having some tingling around nose and sinus pressure. Pt declines triage and would just like appt. Pt denies any triage at this time but would just like to see her Dr. Pt told that I will route message to see if earlier appt as she has nothing for 2 months or greater. Pt told to call back if any other questions or concerns.             Reason for Disposition   Requesting regular office appointment and adult stable (no new symptoms, not getting worse)    Protocols used: Information Only Call - No Triage-A-[place]

## 2024-10-28 ENCOUNTER — CLINICAL SUPPORT (OUTPATIENT)
Dept: REHABILITATION | Facility: OTHER | Age: 69
End: 2024-10-28
Payer: COMMERCIAL

## 2024-10-28 DIAGNOSIS — R29.898 DECREASED STRENGTH OF TRUNK AND BACK: Primary | ICD-10-CM

## 2024-10-28 PROCEDURE — 97112 NEUROMUSCULAR REEDUCATION: CPT

## 2024-10-28 PROCEDURE — 97140 MANUAL THERAPY 1/> REGIONS: CPT

## 2024-10-28 PROCEDURE — 97110 THERAPEUTIC EXERCISES: CPT

## 2024-11-09 NOTE — PROGRESS NOTES
Physical Therapy Daily Treatment Note     Name: Cathy Reynolds  Clinic Number: [id number]    Therapy Diagnosis:   No diagnosis found.    Physician: RUCHI Simms NP    Visit Date: 11/11/2024    Physician Orders: PT Eval and Treat   Medical Diagnosis from Referral:   M54.16 (ICD-10-CM) - Lumbar radiculopathy   M41.115 (ICD-10-CM) - Juvenile idiopathic scoliosis of thoracolumbar region   Evaluation Date: 10/15/2024  Authorization Period Expiration: 12/31/24  Plan of Care Expiration: 12/24/24  Visit # / Visits authorized: 2/12 (+eval) Progress Note Due: 11/15/24  FOTO: 1/ 3- needs FOTO     Precautions: hx of cancer     Time In: 4:20 pm  Time Out: 5:20 pm  Total Appointment Time (timed & untimed codes): 60 minutes    Subjective     Pt reports:  she  somewhat  compliant with home exercise program given last session.   Response to previous treatment: good  Functional change: ongoing    Pain: 3/10  Location: left back/ L LE     Objective     Updated at Progress Report Unless Specified    Treatment     Next visit: supine nerve glide, supine TA + SLR, lifting mechanics, deadlifts, paloff press     Cathy received the following manual therapy techniques for 10 minutes:   -STM to L piriformis  - Lumbar sideglides grade 2/3    Cathy received therapeutic exercises for 30 minutes including:  LE bike x 5 min for joint nutrition  LTR x 2 minutes  Sidelying open books 2 x 10 reps each side  Sidelying clam Y TB 2 x 10 3 second holds  Bridge 2 x 10 3 second holds  3 rounds   5 sit<>stands with 15# KB   SA farmers carry 15# x 1 lap each side    Cathy participated in dynamic functional therapeutic activities to improve functional performance for 0  minutes, including:  NA today    Cathy participated in neuromuscular re-education activities to improve: Coordination and Posture for 15 minutes. The following activities were included:  -TA x 10 reps with manual/verbal cues  - Supine TA + BKFO G TB x 10 reps 3 second  holds each side  - Lumbar EX machine 22# x 15 reps (0-65)    Cathy received cold pack for 5 minutes to Lumbar spine in Z lie.    Home Exercises and Education Provided     Education provided:   - HEP review  - Progress towards goals     Written Home Exercises Provided: Patient instructed to cont prior HEP.  Exercises were reviewed and Cathy was able to demonstrate them prior to the end of the session.  Cathy demonstrated good  understanding of the HEP provided.   .   See EMR under Patient Instructions for exercises provided prior visit.      Assessment       Cathy is progressing well towards her goals and there are no updates to goals at this time. Pt prognosis is Good.     Pt will continue to benefit from skilled outpatient physical therapy to address the deficits listed in the problem list on initial evaluation provide pt/family education and to maximize pt's level of independence in the home and community environment.     Anticipated barriers to physical therapy: hesitant that therapy will help     Pt's spiritual, cultural and educational needs considered and pt agreeable to plan of care and goals.    Goals:  Short Term Goals:  4-5 weeks  1. Report decreased in pain at worse less than  <   / =  4  /10  to increase tolerance for functional activities.On going  2. Increased Hip/LE MMT 1/2  to increase tolerance for ADL and work activities.On going  3. Pt to tolerate HEP to improve ROM and independence with ADL's.On going  6. Pt to improve range of motion by 100% to allow for improved functional mobility to allow for improvement in IADLs. On going     Long Term Goals: 8-10 weeks  1. Report decreased in pain at worse less than  <   / =  2  /10  to increase tolerance for functional mobility.  On going  2. Increased B hip/LE MMT 1 grade to increase tolerance for ADL and work activities.On going  3. Pt will report a 58 or greater score on FOTO Lumbar spine survey  to demonstrate decrease in disability  and improvement in back pain.On going  4. Pt to be Independent with HEP to improve ROM and independence with ADL's. On going  5. Pt to demonstrate negative Bridge Test in order to show improved core strength for lumbar stabilization. On going  6. Pt will show proper lifting mechanics to allow for improvement in IADLs. On going     Plan   Plan of care Certification: 10/15/2024 to 12/24/24.    Continue POC    Rj Jefferson, PT

## 2024-11-11 ENCOUNTER — CLINICAL SUPPORT (OUTPATIENT)
Dept: REHABILITATION | Facility: OTHER | Age: 69
End: 2024-11-11
Payer: COMMERCIAL

## 2024-11-11 DIAGNOSIS — R29.898 DECREASED STRENGTH OF TRUNK AND BACK: Primary | ICD-10-CM

## 2024-11-11 PROCEDURE — 97112 NEUROMUSCULAR REEDUCATION: CPT | Mod: CQ

## 2024-11-11 PROCEDURE — 97110 THERAPEUTIC EXERCISES: CPT | Mod: CQ

## 2024-11-11 PROCEDURE — 97530 THERAPEUTIC ACTIVITIES: CPT | Mod: CQ

## 2024-11-11 NOTE — PROGRESS NOTES
Physical Therapy Daily Treatment Note     [facility] Number[id number]    Therapy Diagnosis:   Encounter Diagnosis   Name Primary?    Decreased strength of trunk and back Yes       Physician: RUCHI Simms NP    Visit Date: 11/11/2024    Physician Orders: PT Eval and Treat   Medical Diagnosis from Referral:   M54.16 (ICD-10-CM) - Lumbar radiculopathy   M41.115 (ICD-10-CM) - Juvenile idiopathic scoliosis of thoracolumbar region   Evaluation Date: 10/15/2024  Authorization Period Expiration: 12/31/24  Plan of Care Expiration: 12/24/24  Visit # / Visits authorized: 3/12   Progress Note Due: 11/15/24  FOTO: 2/ 3     Precautions: hx of cancer     Time In: 4:30 pm  Time Out: 5:25 pm  Total Appointment Time (timed & untimed codes): 55 minutes    Subjective     Pt reports: that she has done the exercises some. Still having pain.  she  somewhat  compliant with home exercise program given last session.   Response to previous treatment: good  Functional change: ongoing    Pain: 3/10  Location: left back/ L LE     Objective     Updated at Progress Report Unless Specified    Treatment     Next visit: supine nerve glide, supine TA + SLR, deadlifts, paloff press     Cathy received the following manual therapy techniques for 10 minutes:   -STM to L piriformis  - Lumbar sideglides grade 2/3    Cathy received therapeutic exercises for 30 minutes including:  LE bike x 5 min for joint nutrition  LTR x 2 minutes  Sidelying open books 2 x 10 reps each side  Sidelying clam Y TB 2 x 10 3 second holds  Bridge 2 x 10 3 second holds  3 rounds   5 sit<>stands with 15# KB   SA farmers carry 15# x 1 lap each side    Cathy participated in dynamic functional therapeutic activities to improve functional performance for 10  minutes, including: Proper Lifting Mechnaics  Do's and Don'ts of Lifting Handout  Hip hinge x10  Floor lift x10  (box c/ 10 lb DB)  Deadlifts 15 lb KB x5        Cathy participated in  neuromuscular re-education activities to improve: Coordination and Posture for 15 minutes. The following activities were included:  -TA x 10 reps with manual/verbal cues  - Supine TA + BKFO G TB x 10 reps 3 second holds each side  - Lumbar EX machine 22# x 18 reps (0-65)  - MedX core torso rot ROM x15 (no weight)    Cathy received cold pack for 5 minutes to Lumbar spine in Z lie.    Home Exercises and Education Provided     Education provided:   - HEP review  - Progress towards goals     Written Home Exercises Provided: Patient instructed to cont prior HEP.  Exercises were reviewed and Cathy was able to demonstrate them prior to the end of the session.  Cathy demonstrated good  understanding of the HEP provided.   .   See EMR under Patient Instructions for exercises provided prior visit.      Assessment   Patient demonstrated good tolerance to more strengthening and lumbar core work this session. Improvement in pain/adverse symptoms by the end of the session.    Cathy is progressing well towards her goals and there are no updates to goals at this time. Pt prognosis is Good.     Pt will continue to benefit from skilled outpatient physical therapy to address the deficits listed in the problem list on initial evaluation provide pt/family education and to maximize pt's level of independence in the home and community environment.     Anticipated barriers to physical therapy: hesitant that therapy will help     Pt's spiritual, cultural and educational needs considered and pt agreeable to plan of care and goals.    Goals:  Short Term Goals:  4-5 weeks  1. Report decreased in pain at worse less than  <   / =  4  /10  to increase tolerance for functional activities.On going  2. Increased Hip/LE MMT 1/2  to increase tolerance for ADL and work activities.On going  3. Pt to tolerate HEP to improve ROM and independence with ADL's.On going  6. Pt to improve range of motion by 100% to allow for improved functional  mobility to allow for improvement in IADLs. On going     Long Term Goals: 8-10 weeks  1. Report decreased in pain at worse less than  <   / =  2  /10  to increase tolerance for functional mobility.  On going  2. Increased B hip/LE MMT 1 grade to increase tolerance for ADL and work activities.On going  3. Pt will report a 58 or greater score on FOTO Lumbar spine survey  to demonstrate decrease in disability and improvement in back pain.On going  4. Pt to be Independent with HEP to improve ROM and independence with ADL's. On going  5. Pt to demonstrate negative Bridge Test in order to show improved core strength for lumbar stabilization. On going  6. Pt will show proper lifting mechanics to allow for improvement in IADLs. On going     Plan   Plan of care Certification: 10/15/2024 to 12/24/24.    Continue POC    Hyun Mullins, PTA

## 2024-12-06 ENCOUNTER — TELEPHONE (OUTPATIENT)
Dept: HEMATOLOGY/ONCOLOGY | Facility: CLINIC | Age: 69
End: 2024-12-06
Payer: COMMERCIAL

## 2024-12-06 NOTE — TELEPHONE ENCOUNTER
I called the patient from her portal message. No answer. I left a voicemail letting her know the appt she is is the soonest appt Dr Dc has for the reminder of the year.

## 2024-12-23 ENCOUNTER — TELEPHONE (OUTPATIENT)
Dept: NEUROSURGERY | Facility: CLINIC | Age: 69
End: 2024-12-23
Payer: COMMERCIAL

## 2024-12-23 ENCOUNTER — PATIENT MESSAGE (OUTPATIENT)
Dept: NEUROSURGERY | Facility: CLINIC | Age: 69
End: 2024-12-23
Payer: COMMERCIAL

## 2024-12-23 DIAGNOSIS — D33.3 ACOUSTIC NEUROMA: Primary | ICD-10-CM

## 2024-12-23 NOTE — TELEPHONE ENCOUNTER
Pt would like to get MRI brain at [facility] in [place]. Order faxed. PT will call to schedule MRI and bring disc to appt. Scheduled for appt with Dr. Woods. Pt accepted.     ----- Message from Danny sent at 12/23/2024 10:13 AM CST -----  Regarding: Cathy  Type:Patient Callback     Who called: Cathy     What is the request in detail: Patient would like for someone in the office to give her a call. She would like to know if the she needs to have an MRI done and when would be she seen for an appointment. Please reach out to the patient as soon as possible.     Can the clinic reply by [facility]? Yes     Would the patient rather a call back or a response via [facility]? Callback     Best call back number: .[phone]    Additional Information:

## 2024-12-23 NOTE — TELEPHONE ENCOUNTER
----- Message from Christine sent at 12/23/2024 10:53 AM CST -----  Contact: [phone]  Patient is calling to reschedule appointment. Please call to assist. Time for 2/11/2025 RS

## 2024-12-23 NOTE — TELEPHONE ENCOUNTER
Clinical information faxed to [phone] for review.     ----- Message from Madison sent at 12/23/2024 12:57 PM CST -----  Regarding: PENDED CASE/ NO CLINICALS AVAILABLE  Hello,   This test for has been PENDED authorization and no clinicals are available for patient regarding this exam.      You may contact [facility] at [phone] to schedule and complete a peer to peer review, reference # [id number] or you can complete an addendum to be sent to the insurance payor.       Thank you in advance,   Madison HASKINS    [id number]

## 2024-12-26 ENCOUNTER — OFFICE VISIT (OUTPATIENT)
Dept: HEMATOLOGY/ONCOLOGY | Facility: CLINIC | Age: 69
End: 2024-12-26
Payer: COMMERCIAL

## 2024-12-26 VITALS
DIASTOLIC BLOOD PRESSURE: 91 MMHG | OXYGEN SATURATION: 100 % | BODY MASS INDEX: 15.98 KG/M2 | HEIGHT: 63 IN | HEART RATE: 87 BPM | RESPIRATION RATE: 18 BRPM | SYSTOLIC BLOOD PRESSURE: 191 MMHG | WEIGHT: 90.19 LBS | TEMPERATURE: 98 F

## 2024-12-26 DIAGNOSIS — D05.10 DUCTAL CARCINOMA IN SITU (DCIS) OF BREAST, UNSPECIFIED LATERALITY: Primary | ICD-10-CM

## 2024-12-26 PROCEDURE — 3077F SYST BP >= 140 MM HG: CPT | Mod: CPTII,S$GLB,, | Performed by: INTERNAL MEDICINE

## 2024-12-26 PROCEDURE — 1101F PT FALLS ASSESS-DOCD LE1/YR: CPT | Mod: CPTII,S$GLB,, | Performed by: INTERNAL MEDICINE

## 2024-12-26 PROCEDURE — G2211 COMPLEX E/M VISIT ADD ON: HCPCS | Mod: S$GLB,,, | Performed by: INTERNAL MEDICINE

## 2024-12-26 PROCEDURE — 99214 OFFICE O/P EST MOD 30 MIN: CPT | Mod: S$GLB,,, | Performed by: INTERNAL MEDICINE

## 2024-12-26 PROCEDURE — 3008F BODY MASS INDEX DOCD: CPT | Mod: CPTII,S$GLB,, | Performed by: INTERNAL MEDICINE

## 2024-12-26 PROCEDURE — 4010F ACE/ARB THERAPY RXD/TAKEN: CPT | Mod: CPTII,S$GLB,, | Performed by: INTERNAL MEDICINE

## 2024-12-26 PROCEDURE — 3080F DIAST BP >= 90 MM HG: CPT | Mod: CPTII,S$GLB,, | Performed by: INTERNAL MEDICINE

## 2024-12-26 PROCEDURE — 3288F FALL RISK ASSESSMENT DOCD: CPT | Mod: CPTII,S$GLB,, | Performed by: INTERNAL MEDICINE

## 2024-12-26 PROCEDURE — 1126F AMNT PAIN NOTED NONE PRSNT: CPT | Mod: CPTII,S$GLB,, | Performed by: INTERNAL MEDICINE

## 2024-12-26 PROCEDURE — 99999 PR PBB SHADOW E&M-EST. PATIENT-LVL III: CPT | Mod: PBBFAC,,, | Performed by: INTERNAL MEDICINE

## 2024-12-31 NOTE — PROGRESS NOTES
[facility]/ [facility] Clinic Note:      Chief Complaint:   Encounter Diagnosis   Name Primary?    Ductal carcinoma in situ (DCIS) of breast, unspecified laterality Yes          Cancer Staging   Ductal carcinoma in situ (DCIS) of breast  Staging form: Breast, AJCC 8th Edition  - Pathologic stage from 2021: Stage 0 (pTis (DCIS), pN0, cM0, G2, ER+, DC+, HER2: Not Assessed) - Signed by Jaja Dc MD on 2024      HPI:  Cathy Reynolds is a 69 y.o. female who presents today for follow up. She has been doing well. No new complaints     Oncology History   Abnormal screening mammogram 21   Follow-up mammogram (21) showed Right breast 72 mm calcifications at the posterior 6 o'clock position.   Stereotactic biopsy 21: DCIS, grade 2, ER 90%/ DC 30%    Right mastectomy and SLNB on 21: DCIS, grade 2; 0/1 LN+   Discussion of adjuvant endocrine therapy for contralateral ppx, but patient opted against      GYN History:  Age of menarche was 11.   Age of menopause was 48.  Last menstrual period was 48. Patient denies hormonal therapy.   Patient is . Age of first live birth was 31. Patient did not breast feed.      Patient Active Problem List   Diagnosis    Osteoporosis, postmenopausal    Postmenopausal status    Diverticulosis    Mild anxiety    Underweight    AR (allergic rhinitis)    Essential hypertension    Vitamin D deficiency    H/O brain tumor    History of ductal carcinoma in situ (DCIS) of breast    Osteopenia    History of acoustic neuroma    Ductal carcinoma in situ (DCIS) of breast    Decreased strength of trunk and back       Current Outpatient Medications   Medication Instructions    cholecalciferol, vitamin D3, (VITAMIN D3) 50 mcg (2,000 unit) Cap 2 capsules, Oral, Daily    clotrimazole-betamethasone 1-0.05% (LOTRISONE) cream Topical (Top), 2 times daily    cyanocobalamin (VITAMIN B-12) 100 mcg, Oral, Daily PRN     "fluticasone propionate (FLONASE) 50 mcg/actuation nasal spray SHAKE LIQUID AND USE 1 SPRAY IN EACH NOSTRIL TWICE DAILY    folic acid/multivit-min/lutein (CENTRUM SILVER ORAL) Oral    gabapentin (NEURONTIN) 100 mg, Oral, Nightly PRN    olmesartan (BENICAR) 5 mg, Oral       Review of Systems:   Review of Systems   Constitutional: Negative.    HENT: Negative.     Respiratory: Negative.     Cardiovascular: Negative.    Gastrointestinal: Negative.    Musculoskeletal: Negative.    All other systems reviewed and are negative.      PHYSICAL EXAM:  BP (!) 191/91   Pulse 87   Temp 97.6 °F (36.4 °C) (Oral)   Resp 18   Ht 5' 3" (1.6 m)   Wt 40.9 kg (90 lb 2.7 oz)   SpO2 100%   BMI 15.97 kg/m²     General Appearance:    Alert, cooperative, no distress, appears stated age   Lungs:     Clear to auscultation bilaterally, respirations unlabored    Heart:    Regular rate and rhythm, S1 and S2 normal   Breast Exam:    S/p right mastectomy, no chest wall mass or nodularity. Left breast without mass, nodule or skin changes. Nipple without inversion. No axillary or supraclavicular adenopathy.   Abdomen:     Soft, non-tender, bowel sounds active, no masses, no organomegaly   Extremities:   Extremities normal, atraumatic, no cyanosis or edema   Pulses:   2+ and symmetric all extremities   Skin:   Skin color, texture, turgor normal, no rashes or lesions   Lymph nodes:   Cervical, supraclavicular, and axillary nodes normal   Neurologic:   CNII-XII intact, normal strength, sensation and reflexes     throughout           Pertinent Labs & Imaging:  Pathology Results  (Last 30 days)      None            No results found for this or any previous visit (from the past 24 hours).    Assessment & Plan:    1. Ductal carcinoma in situ (DCIS) of breast, unspecified laterality      Patient with history of DCIS s/p R mastectomy Sept 2021  Currently on observation. Doing well without major issues. Discussed healthy diet   L breast mammogram April " 2024; repeat April 2025  Will plan for 6 mos follow up      Per NCCN Guidelines, breast cancer patients should be followed every 3-12 months for the first five years and then annually thereafter with annual mammography if mastectomy or breast reconstruction was not undertaken. At this time, there is no indication for routine laboratory or imaging in the surveillance for metastatic disease, unless clinical signs or symptoms arise.    Healthy diet, low alcohol intake, and an active lifestyle, with a goal of an ideal BMI (20-25) is also encouraged to reduce the risk of breast cancer occurrences and recurrences, as well as improve side effects to anti-estrogen medications      Route Chart for Scheduling    Med Onc Chart Routing      Follow up with physician 1 year.   Follow up with VANDANA 6 months.   Infusion scheduling note    Injection scheduling note    Labs    Imaging    Pharmacy appointment    Other referrals                       MDM includes  :    - Acute or chronic illness or injury that poses a threat to life or bodily function  - Review of prior external notes from unique source  - Independent review and explanation of 1 results from unique tests  - Extensive discussion of treatment and management          Jaja Dc MD   12/31/2024

## 2025-01-02 ENCOUNTER — TELEPHONE (OUTPATIENT)
Dept: OTOLARYNGOLOGY | Facility: CLINIC | Age: 70
End: 2025-01-02
Payer: COMMERCIAL

## 2025-01-02 NOTE — TELEPHONE ENCOUNTER
LM on patient's VM that I needed to RS her from 1/8, Dr. Nunn will be out of office that day. I will look for another appointment with audio and call her back.

## 2025-02-11 ENCOUNTER — OFFICE VISIT (OUTPATIENT)
Dept: NEUROSURGERY | Facility: CLINIC | Age: 70
End: 2025-02-11
Payer: COMMERCIAL

## 2025-02-11 DIAGNOSIS — D33.3 ACOUSTIC NEUROMA: Primary | ICD-10-CM

## 2025-02-11 PROCEDURE — 99213 OFFICE O/P EST LOW 20 MIN: CPT | Mod: S$GLB,,, | Performed by: PHYSICIAN ASSISTANT

## 2025-02-11 PROCEDURE — 4010F ACE/ARB THERAPY RXD/TAKEN: CPT | Mod: CPTII,S$GLB,, | Performed by: PHYSICIAN ASSISTANT

## 2025-02-11 NOTE — PROGRESS NOTES
Subjective:   I, Jaja Hickey , attest that this documentation has been prepared under the direction and in the presence of Orion Woods MD.     Patient ID: Cathy Reynolds is a 69 y.o. female     Chief Complaint: No chief complaint on file.    HPI  MsRenaldo Reynolds is a 69 y.o. woman with an acoustic neuroma, treated with Gamma Knife Radiosurgery on 1/25/2019, who presents today for 1 year follow up with MRI brain. At the time of our last clinic visit on 1/02/24, the pt reports she is doing well overall. She has been actively exercising and maintaining normal function without complication.    Today the patient reports she is doing well and has no issues.     Review of Systems   Constitutional:  Negative for activity change, appetite change, fatigue, fever and unexpected weight change.   HENT:  Negative for facial swelling.    Eyes: Negative.    Respiratory: Negative.     Cardiovascular: Negative.    Gastrointestinal:  Negative for diarrhea, nausea and vomiting.   Endocrine: Negative.    Genitourinary: Negative.    Musculoskeletal:  Negative for back pain, joint swelling, myalgias and neck pain.   Neurological:  Negative for dizziness, seizures, weakness, numbness and headaches.   Psychiatric/Behavioral: Negative.          Past Medical History:   Diagnosis Date    AR (allergic rhinitis)     Breast cancer     RIGHT    CHF (congestive heart failure)     Diverticulosis     Ear pain, right     Elevated LFTs     borderline - due to OTC herbals - resolved    History of colon polyps     Hyperlipidemia     borderline with high HDL    Hypertension     Mild anxiety     Osteoporosis, postmenopausal     Postmenopausal status     Ringing in ear, right     Underweight     Vitamin D deficiency        Objective:    There were no vitals filed for this visit.   Physical Exam  Constitutional:       General: She is not in acute distress.     Appearance: Normal appearance.   HENT:      Head: Normocephalic and atraumatic.    Pulmonary:      Effort: Pulmonary effort is normal.   Musculoskeletal:      Cervical back: Neck supple.   Neurological:      Mental Status: She is alert and oriented to person, place, and time.      GCS: GCS eye subscore is 4. GCS verbal subscore is 5. GCS motor subscore is 6.      Cranial Nerves: No cranial nerve deficit.       IMAGING:  MRI Head Outside Imaging 1/31/25: ([facility])  Postsurgical changes of prior radiosurgery. The schwannoma is stable in size. There is no associated vasogenic edema.     I have personally reviewed the images with the pt.      I, Dr. Orion Woods, personally performed the services described in this documentation. All medical record entries made by the scribe, Jaja Hickey, were at my direction and in my presence.  I have reviewed the chart and agree that the record reflects my personal performance and is accurate and complete. Orion Woods MD. 02/11/2025    Assessment:       No diagnosis found.     Plan:   I have personally reviewed the imaging with the pt.    I would like the patient to follow-up in clinic in MRI brain w/wo contrast in 1 year. I have encouraged her to contact the clinic with any questions, concerns, or adverse clinical changes. She verbalized understanding.       Jaja Hickey PA-C  Neurosurgery   [facility]

## 2025-03-17 ENCOUNTER — TELEPHONE (OUTPATIENT)
Dept: NEUROSURGERY | Facility: CLINIC | Age: 70
End: 2025-03-17
Payer: COMMERCIAL

## 2025-03-17 NOTE — TELEPHONE ENCOUNTER
----- Message from Josselyn sent at 3/17/2025  4:00 PM CDT -----  Regarding:  called states they received a letter to sche an appt states wife has had an appt already this year for neuroSurgery  Contact: [phone]  Name of Who is Calling:GAYLE VIDES [[id number]]  What is the request in detail: called states they received a letter to sche an appt states wife has had an appt already this year for Neuro Surgery. Please advise   Can the clinic reply by [facility]:Yes  [facility] [phone]

## 2025-03-17 NOTE — TELEPHONE ENCOUNTER
Informed pt to dont worry about the recall as she has already been seen for her 1 yr f/u visit. Pt v/u

## 2025-04-02 ENCOUNTER — OFFICE VISIT (OUTPATIENT)
Dept: PRIMARY CARE CLINIC | Facility: CLINIC | Age: 70
End: 2025-04-02
Payer: COMMERCIAL

## 2025-04-02 ENCOUNTER — LAB VISIT (OUTPATIENT)
Dept: LAB | Facility: HOSPITAL | Age: 70
End: 2025-04-02
Attending: STUDENT IN AN ORGANIZED HEALTH CARE EDUCATION/TRAINING PROGRAM
Payer: COMMERCIAL

## 2025-04-02 VITALS
DIASTOLIC BLOOD PRESSURE: 86 MMHG | SYSTOLIC BLOOD PRESSURE: 164 MMHG | WEIGHT: 93.5 LBS | OXYGEN SATURATION: 95 % | HEART RATE: 79 BPM | HEIGHT: 63 IN | BODY MASS INDEX: 16.57 KG/M2

## 2025-04-02 DIAGNOSIS — I10 ESSENTIAL HYPERTENSION: ICD-10-CM

## 2025-04-02 DIAGNOSIS — Z86.018 HISTORY OF ACOUSTIC NEUROMA: ICD-10-CM

## 2025-04-02 DIAGNOSIS — M54.16 LUMBAR RADICULOPATHY: ICD-10-CM

## 2025-04-02 DIAGNOSIS — R63.6 UNDERWEIGHT: ICD-10-CM

## 2025-04-02 DIAGNOSIS — E46 PROTEIN-CALORIE MALNUTRITION, UNSPECIFIED SEVERITY: ICD-10-CM

## 2025-04-02 DIAGNOSIS — Z12.31 SCREENING MAMMOGRAM FOR BREAST CANCER: ICD-10-CM

## 2025-04-02 DIAGNOSIS — J30.1 ACUTE SEASONAL ALLERGIC RHINITIS DUE TO POLLEN: ICD-10-CM

## 2025-04-02 DIAGNOSIS — Z00.00 ENCOUNTER FOR PREVENTATIVE ADULT HEALTH CARE EXAMINATION: Primary | ICD-10-CM

## 2025-04-02 DIAGNOSIS — Z86.000 HISTORY OF DUCTAL CARCINOMA IN SITU (DCIS) OF BREAST: ICD-10-CM

## 2025-04-02 LAB
ALBUMIN SERPL BCP-MCNC: 4 G/DL (ref 3.5–5.2)
ALP SERPL-CCNC: 56 UNIT/L (ref 40–150)
ALT SERPL W/O P-5'-P-CCNC: 23 UNIT/L (ref 10–44)
ANION GAP (OHS): 4 MMOL/L (ref 8–16)
AST SERPL-CCNC: 25 UNIT/L (ref 11–45)
BILIRUB SERPL-MCNC: 0.3 MG/DL (ref 0.1–1)
BUN SERPL-MCNC: 19 MG/DL (ref 8–23)
CALCIUM SERPL-MCNC: 10.1 MG/DL (ref 8.7–10.5)
CHLORIDE SERPL-SCNC: 103 MMOL/L (ref 95–110)
CO2 SERPL-SCNC: 30 MMOL/L (ref 23–29)
CREAT SERPL-MCNC: 0.7 MG/DL (ref 0.5–1.4)
GFR SERPLBLD CREATININE-BSD FMLA CKD-EPI: >60 ML/MIN/1.73/M2
GLUCOSE SERPL-MCNC: 106 MG/DL (ref 70–110)
POTASSIUM SERPL-SCNC: 4.7 MMOL/L (ref 3.5–5.1)
PROT SERPL-MCNC: 7.4 GM/DL (ref 6–8.4)
SODIUM SERPL-SCNC: 137 MMOL/L (ref 136–145)
VIT B12 SERPL-MCNC: 577 PG/ML (ref 210–950)

## 2025-04-02 PROCEDURE — 99397 PER PM REEVAL EST PAT 65+ YR: CPT | Mod: 25,S$GLB,, | Performed by: STUDENT IN AN ORGANIZED HEALTH CARE EDUCATION/TRAINING PROGRAM

## 2025-04-02 PROCEDURE — 3077F SYST BP >= 140 MM HG: CPT | Mod: CPTII,S$GLB,, | Performed by: STUDENT IN AN ORGANIZED HEALTH CARE EDUCATION/TRAINING PROGRAM

## 2025-04-02 PROCEDURE — 4010F ACE/ARB THERAPY RXD/TAKEN: CPT | Mod: CPTII,S$GLB,, | Performed by: STUDENT IN AN ORGANIZED HEALTH CARE EDUCATION/TRAINING PROGRAM

## 2025-04-02 PROCEDURE — 1125F AMNT PAIN NOTED PAIN PRSNT: CPT | Mod: CPTII,S$GLB,, | Performed by: STUDENT IN AN ORGANIZED HEALTH CARE EDUCATION/TRAINING PROGRAM

## 2025-04-02 PROCEDURE — 99999 PR PBB SHADOW E&M-EST. PATIENT-LVL III: CPT | Mod: PBBFAC,,, | Performed by: STUDENT IN AN ORGANIZED HEALTH CARE EDUCATION/TRAINING PROGRAM

## 2025-04-02 PROCEDURE — 1159F MED LIST DOCD IN RCRD: CPT | Mod: CPTII,S$GLB,, | Performed by: STUDENT IN AN ORGANIZED HEALTH CARE EDUCATION/TRAINING PROGRAM

## 2025-04-02 PROCEDURE — 1101F PT FALLS ASSESS-DOCD LE1/YR: CPT | Mod: CPTII,S$GLB,, | Performed by: STUDENT IN AN ORGANIZED HEALTH CARE EDUCATION/TRAINING PROGRAM

## 2025-04-02 PROCEDURE — 3008F BODY MASS INDEX DOCD: CPT | Mod: CPTII,S$GLB,, | Performed by: STUDENT IN AN ORGANIZED HEALTH CARE EDUCATION/TRAINING PROGRAM

## 2025-04-02 PROCEDURE — 80053 COMPREHEN METABOLIC PANEL: CPT

## 2025-04-02 PROCEDURE — 3288F FALL RISK ASSESSMENT DOCD: CPT | Mod: CPTII,S$GLB,, | Performed by: STUDENT IN AN ORGANIZED HEALTH CARE EDUCATION/TRAINING PROGRAM

## 2025-04-02 PROCEDURE — 99214 OFFICE O/P EST MOD 30 MIN: CPT | Mod: 25,S$GLB,, | Performed by: STUDENT IN AN ORGANIZED HEALTH CARE EDUCATION/TRAINING PROGRAM

## 2025-04-02 PROCEDURE — 36415 COLL VENOUS BLD VENIPUNCTURE: CPT | Mod: PN

## 2025-04-02 PROCEDURE — 82607 VITAMIN B-12: CPT

## 2025-04-02 PROCEDURE — 3079F DIAST BP 80-89 MM HG: CPT | Mod: CPTII,S$GLB,, | Performed by: STUDENT IN AN ORGANIZED HEALTH CARE EDUCATION/TRAINING PROGRAM

## 2025-04-02 RX ORDER — FLUTICASONE PROPIONATE 50 MCG
SPRAY, SUSPENSION (ML) NASAL
Qty: 15.8 G | Refills: 3 | Status: SHIPPED | OUTPATIENT
Start: 2025-04-02

## 2025-04-02 RX ORDER — OLMESARTAN MEDOXOMIL 20 MG/1
20 TABLET ORAL DAILY
Qty: 90 TABLET | Refills: 3 | Status: SHIPPED | OUTPATIENT
Start: 2025-04-02

## 2025-04-02 NOTE — PROGRESS NOTES
"Primary Care  Annual Office Visit - In Person  4/2/2025          Patient is a 69 y.o.   Cathy Reynolds  has a past medical history of AR (allergic rhinitis), Breast cancer, CHF (congestive heart failure), Diverticulosis, Ear pain, right, Elevated LFTs, History of colon polyps, Hyperlipidemia, Hypertension, Mild anxiety, Osteoporosis, postmenopausal, Postmenopausal status, Ringing in ear, right, Underweight, and Vitamin D deficiency.    History of Present Illness    CHIEF COMPLAINT:  Patient presents today for annual visit     UPPER RESPIRATORY SYMPTOMS:  She reports nasal congestion managed with Mucinex, Allegra and Flonase, though she admits to inconsistent Flonase usage, including missing this morning's dose.  She notes decreased appetite due to post-nasal drip.     MUSCULOSKELETAL:  She experiences LLE pain managed through stretches and plans to start yoga classes. She was previously prescribed gabapentin but is not taking it, and had a brief trial of physical therapy. She currently takes magnesium  for symptom management.      Active Medications  Current Outpatient Medications   Medication Instructions    cholecalciferol, vitamin D3, (VITAMIN D3) 50 mcg (2,000 unit) Cap 2 capsules, Daily    clotrimazole-betamethasone 1-0.05% (LOTRISONE) cream 2 times daily    cyanocobalamin (VITAMIN B-12) 100 mcg, Daily PRN    fluticasone propionate (FLONASE) 50 mcg/actuation nasal spray SHAKE LIQUID AND USE 1 SPRAY IN EACH NOSTRIL TWICE DAILY    folic acid/multivit-min/lutein (CENTRUM SILVER ORAL) Take by mouth.    gabapentin (NEURONTIN) 100 mg, Oral, Nightly PRN    olmesartan (BENICAR) 20 mg, Oral, Daily       Annual Review of Preventative Care    Health Maintenance Due   Topic Date Due    RSV Vaccine (Age 60+ and Pregnant patients) (1 - Risk 60-74 years 1-dose series) Never done    Mammogram  04/15/2025     Diabetes Screening:  No results found for: "LABGLYC", "HEMOGLOBINA1", "HGBA1C"  BP Readings from Last 3 Encounters: "   04/02/25 (!) 164/86   12/26/24 (!) 191/91   09/27/24 136/82     Wt Readings from Last 3 Encounters:   04/02/25 0849 42.4 kg (93 lb 7.6 oz)   12/26/24 1534 40.9 kg (90 lb 2.7 oz)   09/27/24 1509 41.6 kg (91 lb 11.4 oz)           Physical Exam  Vitals reviewed.   Constitutional:       General: She is not in acute distress.  Eyes:      Pupils: Pupils are equal, round, and reactive to light.   Cardiovascular:      Rate and Rhythm: Normal rate and regular rhythm.      Pulses: Normal pulses.      Heart sounds: Normal heart sounds.   Pulmonary:      Effort: Pulmonary effort is normal.      Breath sounds: Normal breath sounds.   Abdominal:      General: Abdomen is flat. Bowel sounds are normal.      Palpations: Abdomen is soft.   Musculoskeletal:      Lumbar back: No tenderness or bony tenderness. Normal range of motion. Negative right straight leg raise test and negative left straight leg raise test.      Right lower leg: No edema.      Left lower leg: No edema.             Assessment & Plan      HYPERTENSION:  Poorly controlled.  Increase olmesartan from 5 to 20 mg daily.  Advised the patient to bring their home blood pressure machine to future appointment.  F/u CMP     ALLERGIC RHINITIS:  Instructed the patient to perform nasal irrigation with a Neti pot.  Advised continuing Allegra for allergy management.  Increased Flonase nasal spray dosage to twice daily use.  Explained the connection between poor water intake and thicker mucus consistency.    LUMBAR RADICULOPATHY:  Patient declined gabapentin and PT  No concerning findings on exam  F/u B12    ACOUSTIC NEUROMA:   S/p gamma knife radiosurgery 2019  Schwannoma is stable.  Follow up with Neurosurgery    DCIS RIGHT BREAST:   S/p right mastectomy 2021  On observation  Mammogram due this month    OSTEOPENIA:   Recommend adequate vitamin-D and calcium intake  We will discuss referral to endocrinology at follow up    UNDERWEIGHT BMI 16.56:  F/u B12 as above                    Orders Placed This Encounter    Comprehensive Metabolic Panel    Vitamin B12    fluticasone propionate (FLONASE) 50 mcg/actuation nasal spray    olmesartan (BENICAR) 20 MG tablet                             Upcoming Scheduled Appointments and Follow Up:    Future Appointments   Date Time Provider Department Center   5/8/2025  3:20 PM Elias Marin MD [facility]       Follow Up [facility] (with who? when?): Follow up in about 1 month (around 5/2/2025) for bp check .        Extended Emergency Contact Information  Primary Emergency Contact: Ammon Reynolds Jr  Address: [address]           [place] [address] [place]  Home Phone: [phone]  Work Phone: [phone]  Relation: Spouse      Elias Marin MD   Internal Medicine  4/2/2025 - 12:01 PM    I spent a total of 30 minutes on the day of the visit.This includes face to face time and non-face to face time preparing to see the patient (eg, review of tests), obtaining and/or reviewing separately obtained history, documenting clinical information in the electronic or other health record, independently interpreting results and communicating results to the patient/family/caregiver, or care coordinator.    This note was generated with the assistance of ambient listening technology. Verbal consent was obtained by the patient and accompanying visitor(s) for the recording of patient appointment to facilitate this note. I attest to having reviewed and edited the generated note for accuracy, though some syntax or spelling errors may persist. Please contact the author of this note for any clarification.      While patients have the right to access their medical record, it is essential to recognize that progress notes primarily serve as a means of communication among healthcare professionals.

## 2025-04-02 NOTE — PATIENT INSTRUCTIONS
·         Make sure to stay well hydrated.      ·         Use Nasal Saline to mechanically move mucus from nostrils      ·         Use an antihistamine such as Claritin, Zyrtec or Allegra for runny nose     ·         Use Mucinex (guaifenesin) to break up mucous up to 2400mg/day to loosen any mucous.      ·         Use Flonase 1-2 sprays/nostril twice daily      ·         Tylenol up to 4,000 mg a day is safe for short periods and can be used for body aches, pain, and fever. However, in high doses and prolonged use it can cause liver irritation.

## 2025-04-03 ENCOUNTER — RESULTS FOLLOW-UP (OUTPATIENT)
Dept: PRIMARY CARE CLINIC | Facility: CLINIC | Age: 70
End: 2025-04-03

## 2025-04-14 ENCOUNTER — TELEPHONE (OUTPATIENT)
Dept: OTOLARYNGOLOGY | Facility: CLINIC | Age: 70
End: 2025-04-14
Payer: COMMERCIAL

## 2025-04-14 NOTE — TELEPHONE ENCOUNTER
Spoke with patient only off Good Friday, explained to her Dr. Nunn or Dr. Karimi is not here on Friday's, patient states will call in June to make a July appointment when takes a vacation.

## 2025-04-14 NOTE — TELEPHONE ENCOUNTER
----- Message from Francisco Esteban sent at 3/26/2025  4:42 PM CDT -----    ----- Message -----  From: Ke Herndon  Sent: 3/26/2025  12:53 PM CDT  To: [facility] Staff    Type:  Sooner Apoointment RequestCaller is requesting a sooner appointment.  Caller declined first available appointment listed below.  Caller will  accept being placed on the waitlist and is requesting a message be sent to doctor.Name of Caller:ptWhen is the first available appointment?none in epicSymptoms:annul annual/ears cleanedWould the patient rather a call back or a response via [facility]? [facility] Call Back Number:[phone] Information: .

## 2025-05-08 ENCOUNTER — OFFICE VISIT (OUTPATIENT)
Dept: PRIMARY CARE CLINIC | Facility: CLINIC | Age: 70
End: 2025-05-08
Payer: COMMERCIAL

## 2025-05-08 VITALS
SYSTOLIC BLOOD PRESSURE: 150 MMHG | OXYGEN SATURATION: 95 % | HEIGHT: 63 IN | BODY MASS INDEX: 16.49 KG/M2 | DIASTOLIC BLOOD PRESSURE: 86 MMHG | WEIGHT: 93.06 LBS | HEART RATE: 82 BPM

## 2025-05-08 DIAGNOSIS — I10 ESSENTIAL HYPERTENSION: Primary | ICD-10-CM

## 2025-05-08 DIAGNOSIS — M81.0 OSTEOPOROSIS, POSTMENOPAUSAL: ICD-10-CM

## 2025-05-08 DIAGNOSIS — Z86.000 HISTORY OF DUCTAL CARCINOMA IN SITU (DCIS) OF BREAST: ICD-10-CM

## 2025-05-08 DIAGNOSIS — Z87.898 H/O BRAIN TUMOR: ICD-10-CM

## 2025-05-08 PROCEDURE — 99999 PR PBB SHADOW E&M-EST. PATIENT-LVL IV: CPT | Mod: PBBFAC,,, | Performed by: STUDENT IN AN ORGANIZED HEALTH CARE EDUCATION/TRAINING PROGRAM

## 2025-05-08 PROCEDURE — 3288F FALL RISK ASSESSMENT DOCD: CPT | Mod: CPTII,S$GLB,, | Performed by: STUDENT IN AN ORGANIZED HEALTH CARE EDUCATION/TRAINING PROGRAM

## 2025-05-08 PROCEDURE — 1160F RVW MEDS BY RX/DR IN RCRD: CPT | Mod: CPTII,S$GLB,, | Performed by: STUDENT IN AN ORGANIZED HEALTH CARE EDUCATION/TRAINING PROGRAM

## 2025-05-08 PROCEDURE — 3008F BODY MASS INDEX DOCD: CPT | Mod: CPTII,S$GLB,, | Performed by: STUDENT IN AN ORGANIZED HEALTH CARE EDUCATION/TRAINING PROGRAM

## 2025-05-08 PROCEDURE — 1126F AMNT PAIN NOTED NONE PRSNT: CPT | Mod: CPTII,S$GLB,, | Performed by: STUDENT IN AN ORGANIZED HEALTH CARE EDUCATION/TRAINING PROGRAM

## 2025-05-08 PROCEDURE — 1159F MED LIST DOCD IN RCRD: CPT | Mod: CPTII,S$GLB,, | Performed by: STUDENT IN AN ORGANIZED HEALTH CARE EDUCATION/TRAINING PROGRAM

## 2025-05-08 PROCEDURE — 99214 OFFICE O/P EST MOD 30 MIN: CPT | Mod: S$GLB,,, | Performed by: STUDENT IN AN ORGANIZED HEALTH CARE EDUCATION/TRAINING PROGRAM

## 2025-05-08 PROCEDURE — 3074F SYST BP LT 130 MM HG: CPT | Mod: CPTII,S$GLB,, | Performed by: STUDENT IN AN ORGANIZED HEALTH CARE EDUCATION/TRAINING PROGRAM

## 2025-05-08 PROCEDURE — 4010F ACE/ARB THERAPY RXD/TAKEN: CPT | Mod: CPTII,S$GLB,, | Performed by: STUDENT IN AN ORGANIZED HEALTH CARE EDUCATION/TRAINING PROGRAM

## 2025-05-08 PROCEDURE — 1101F PT FALLS ASSESS-DOCD LE1/YR: CPT | Mod: CPTII,S$GLB,, | Performed by: STUDENT IN AN ORGANIZED HEALTH CARE EDUCATION/TRAINING PROGRAM

## 2025-05-08 PROCEDURE — 3079F DIAST BP 80-89 MM HG: CPT | Mod: CPTII,S$GLB,, | Performed by: STUDENT IN AN ORGANIZED HEALTH CARE EDUCATION/TRAINING PROGRAM

## 2025-05-08 NOTE — PROGRESS NOTES
"Primary Care  Office Visit - In Person  5/8/2025      HPI    Patient is a 69 y.o.   Cathy Reynolds  has a past medical history of AR (allergic rhinitis), Breast cancer, CHF (congestive heart failure), Diverticulosis, Ear pain, right, Elevated LFTs, History of colon polyps, Hyperlipidemia, Hypertension, Mild anxiety, Osteoporosis, postmenopausal, Postmenopausal status, Ringing in ear, right, Underweight, and Vitamin D deficiency.    History of Present Illness    CHIEF COMPLAINT:  Patient presents today for follow up of blood pressure management    HYPERTENSION:  She reports home blood pressure readings ranging from 118/80 to 135/87. She currently takes olmesartan 20mg and experiences fatigue as a side effect. She switched to taking the medication at night approximately one week ago with slight improvement in side effects.    PREVENTIVE CARE:  She is overdue for mammogram follow-up, which was last performed in April 2024 with recommendation for repeat imaging in April 2025.       Active Medications:  Current Outpatient Medications   Medication Instructions    cholecalciferol, vitamin D3, (VITAMIN D3) 50 mcg (2,000 unit) Cap 2 capsules, Daily    clotrimazole-betamethasone 1-0.05% (LOTRISONE) cream 2 times daily    cyanocobalamin (VITAMIN B-12) 100 mcg, Daily PRN    fluticasone propionate (FLONASE) 50 mcg/actuation nasal spray SHAKE LIQUID AND USE 1 SPRAY IN EACH NOSTRIL TWICE DAILY    folic acid/multivit-min/lutein (CENTRUM SILVER ORAL) Take by mouth.    gabapentin (NEURONTIN) 100 mg, Oral, Nightly PRN    olmesartan (BENICAR) 20 mg, Oral, Daily       Vitals:    05/08/25 0600 05/08/25 1523 05/08/25 1541   BP: 118/80 (!) 148/84 (!) 150/86   BP Location:  Left arm    Patient Position:  Sitting    Pulse:  82    SpO2:  95%    Weight:  42.2 kg (93 lb 0.6 oz)    Height:  5' 3" (1.6 m)        Physical Exam  Vitals reviewed.   Constitutional:       General: She is not in acute distress.  Cardiovascular:      Rate and " Rhythm: Normal rate and regular rhythm.      Pulses: Normal pulses.      Heart sounds: Normal heart sounds.   Pulmonary:      Effort: Pulmonary effort is normal.      Breath sounds: Normal breath sounds.   Abdominal:      General: Abdomen is flat. Bowel sounds are normal.      Palpations: Abdomen is soft.   Musculoskeletal:      Right lower leg: No edema.      Left lower leg: No edema.   Neurological:      General: No focal deficit present.      Mental Status: She is oriented to person, place, and time.            Assessment & Plan      HYPERTENSION:  Advised the patient to bring their home blood pressure machine to future appointment.   Continue Olmesartan 20 mg daily.      ACOUSTIC NEUROMA:   S/p gamma knife radiosurgery 2019  Schwannoma is stable.  Follow up with Neurosurgery     DCIS RIGHT BREAST:   S/p right mastectomy 2021  On observation  Mammogram overdue. Patient reminded      OSTEOPENIA:   Recommend adequate vitamin-D and calcium intake  We will discuss referral to endocrinology at follow up            Previous labs, records, and notes reviewed and considered for their impact on clinical decision making today.                Upcoming Scheduled Appointments and Follow Up:    Future Appointments   Date Time Provider Department Center   8/4/2025  9:20 AM Elias Marin MD [facility]       Follow Up [facility] (with who? when?): Follow up in about 3 months (around 8/8/2025).      Extended Emergency Contact Information  Primary Emergency Contact: Ammon Reynolds Jr  Address: [address]           [place] [address] [place]  Home Phone: [phone]  Work Phone: [phone]  Relation: Spouse      Elias Marin MD   Internal Medicine  5/8/2025 - 3:41 PM    I spent a total of 20 minutes on the day of the visit.This includes face to face time and non-face to face time preparing to see the patient (eg, review of tests), obtaining and/or reviewing separately  obtained history, documenting clinical information in the electronic or other health record, independently interpreting results and communicating results to the patient/family/caregiver, or care coordinator.    This note was generated with the assistance of ambient listening technology. Verbal consent was obtained by the patient and accompanying visitor(s) for the recording of patient appointment to facilitate this note. I attest to having reviewed and edited the generated note for accuracy, though some syntax or spelling errors may persist. Please contact the author of this note for any clarification.      While patients have the right to access their medical record, it is essential to recognize that progress notes primarily serve as a means of communication among healthcare professionals.

## 2025-05-31 ENCOUNTER — HOSPITAL ENCOUNTER (OUTPATIENT)
Dept: RADIOLOGY | Facility: OTHER | Age: 70
Discharge: HOME | End: 2025-05-31
Attending: STUDENT IN AN ORGANIZED HEALTH CARE EDUCATION/TRAINING PROGRAM
Payer: COMMERCIAL

## 2025-05-31 VITALS — HEIGHT: 63 IN | WEIGHT: 94 LBS | BODY MASS INDEX: 16.66 KG/M2

## 2025-05-31 DIAGNOSIS — Z12.31 SCREENING MAMMOGRAM FOR BREAST CANCER: ICD-10-CM

## 2025-05-31 DIAGNOSIS — Z86.000 HISTORY OF DUCTAL CARCINOMA IN SITU (DCIS) OF BREAST: ICD-10-CM

## 2025-05-31 PROCEDURE — 77063 BREAST TOMOSYNTHESIS BI: CPT | Mod: 26,52,, | Performed by: RADIOLOGY

## 2025-05-31 PROCEDURE — 77067 SCR MAMMO BI INCL CAD: CPT | Mod: 26,52,, | Performed by: RADIOLOGY

## 2025-05-31 PROCEDURE — 77067 SCR MAMMO BI INCL CAD: CPT | Mod: TC,52

## 2025-06-03 ENCOUNTER — TELEPHONE (OUTPATIENT)
Dept: HEMATOLOGY/ONCOLOGY | Facility: CLINIC | Age: 70
End: 2025-06-03
Payer: COMMERCIAL

## 2025-06-13 ENCOUNTER — OFFICE VISIT (OUTPATIENT)
Dept: HEMATOLOGY/ONCOLOGY | Facility: CLINIC | Age: 70
End: 2025-06-13
Payer: COMMERCIAL

## 2025-06-13 VITALS
WEIGHT: 91.94 LBS | DIASTOLIC BLOOD PRESSURE: 95 MMHG | OXYGEN SATURATION: 98 % | HEIGHT: 63 IN | BODY MASS INDEX: 16.29 KG/M2 | HEART RATE: 64 BPM | SYSTOLIC BLOOD PRESSURE: 172 MMHG | TEMPERATURE: 99 F

## 2025-06-13 DIAGNOSIS — Z12.31 ENCOUNTER FOR SCREENING MAMMOGRAM FOR MALIGNANT NEOPLASM OF BREAST: ICD-10-CM

## 2025-06-13 DIAGNOSIS — Z90.11 HISTORY OF RIGHT MASTECTOMY: ICD-10-CM

## 2025-06-13 DIAGNOSIS — I10 ESSENTIAL HYPERTENSION: ICD-10-CM

## 2025-06-13 DIAGNOSIS — D05.10 DUCTAL CARCINOMA IN SITU (DCIS) OF BREAST, UNSPECIFIED LATERALITY: Primary | ICD-10-CM

## 2025-06-13 PROCEDURE — 4010F ACE/ARB THERAPY RXD/TAKEN: CPT | Mod: CPTII,S$GLB,, | Performed by: NURSE PRACTITIONER

## 2025-06-13 PROCEDURE — 3008F BODY MASS INDEX DOCD: CPT | Mod: CPTII,S$GLB,, | Performed by: NURSE PRACTITIONER

## 2025-06-13 PROCEDURE — 99999 PR PBB SHADOW E&M-EST. PATIENT-LVL III: CPT | Mod: PBBFAC,,, | Performed by: NURSE PRACTITIONER

## 2025-06-13 PROCEDURE — 99214 OFFICE O/P EST MOD 30 MIN: CPT | Mod: S$GLB,,, | Performed by: NURSE PRACTITIONER

## 2025-06-13 PROCEDURE — 3288F FALL RISK ASSESSMENT DOCD: CPT | Mod: CPTII,S$GLB,, | Performed by: NURSE PRACTITIONER

## 2025-06-13 PROCEDURE — 1101F PT FALLS ASSESS-DOCD LE1/YR: CPT | Mod: CPTII,S$GLB,, | Performed by: NURSE PRACTITIONER

## 2025-06-13 PROCEDURE — 3080F DIAST BP >= 90 MM HG: CPT | Mod: CPTII,S$GLB,, | Performed by: NURSE PRACTITIONER

## 2025-06-13 PROCEDURE — 1126F AMNT PAIN NOTED NONE PRSNT: CPT | Mod: CPTII,S$GLB,, | Performed by: NURSE PRACTITIONER

## 2025-06-13 PROCEDURE — 3077F SYST BP >= 140 MM HG: CPT | Mod: CPTII,S$GLB,, | Performed by: NURSE PRACTITIONER

## 2025-06-13 PROCEDURE — G2211 COMPLEX E/M VISIT ADD ON: HCPCS | Mod: S$GLB,,, | Performed by: NURSE PRACTITIONER

## 2025-06-13 NOTE — PROGRESS NOTES
[facility] Note:      Chief Complaint:   Encounter Diagnoses   Name Primary?    Ductal carcinoma in situ (DCIS) of breast, unspecified laterality Yes    Essential hypertension         Cancer Staging   Ductal carcinoma in situ (DCIS) of breast  Staging form: Breast, AJCC 8th Edition  - Pathologic stage from 2021: Stage 0 (pTis (DCIS), pN0, cM0, G2, ER+, MO+, HER2: Not Assessed) - Signed by Jaja Dc MD on 2024    HPI:  Cathy Reynolds is a 69 y.o. female who presents today for follow up. She has been doing well.     She ambulates to the room without assistance and has a steady gait.   She denies breast complaints.  She denies Headaches, Vision Changes, Dizziness, CP/SOB, N/V.  Her appetite is decreased, but she tries to use Boost to supplement.   Since her BP medication has changed she is nauseated in the morning.  She forces herself to eat breakfast, but she states this is tolerable.    Her weight has decreased since the last few visits.  She opted out of referrals to GI or nutrition.       Per Previous Notes:  Oncology History   Abnormal screening mammogram 21   Follow-up mammogram (21) showed Right breast 72 mm calcifications at the posterior 6 o'clock position.   Stereotactic biopsy 21: DCIS, grade 2, ER 90%/ MO 30%    Right mastectomy and SLNB on 21: DCIS, grade 2; 0/1 LN+   Discussion of adjuvant endocrine therapy for contralateral ppx, but patient opted against      GYN History:  Age of menarche was 11.   Age of menopause was 48.  Last menstrual period was 48. Patient denies hormonal therapy.   Patient is . Age of first live birth was 31. Patient did not breast feed.      Patient Active Problem List   Diagnosis    Osteoporosis, postmenopausal    Postmenopausal status    Diverticulosis    Mild anxiety    Underweight    AR (allergic rhinitis)    Essential hypertension    Vitamin D  "deficiency    H/O brain tumor    History of ductal carcinoma in situ (DCIS) of breast    Osteopenia    History of acoustic neuroma    Ductal carcinoma in situ (DCIS) of breast    Decreased strength of trunk and back       Current Outpatient Medications   Medication Instructions    cholecalciferol, vitamin D3, (VITAMIN D3) 50 mcg (2,000 unit) Cap 2 capsules, Daily    clotrimazole-betamethasone 1-0.05% (LOTRISONE) cream 2 times daily    cyanocobalamin (VITAMIN B-12) 100 mcg, Daily PRN    fluticasone propionate (FLONASE) 50 mcg/actuation nasal spray SHAKE LIQUID AND USE 1 SPRAY IN EACH NOSTRIL TWICE DAILY    folic acid/multivit-min/lutein (CENTRUM SILVER ORAL) Take by mouth.    gabapentin (NEURONTIN) 100 mg, Oral, Nightly PRN    olmesartan (BENICAR) 20 mg, Oral, Daily       Review of Systems:   Review of Systems   Constitutional:  Positive for weight loss. Negative for malaise/fatigue.   HENT:  Positive for hearing loss. Negative for sore throat.    Respiratory: Negative.  Negative for cough, shortness of breath, wheezing and stridor.    Cardiovascular: Negative.  Negative for chest pain and orthopnea.   Gastrointestinal:  Positive for nausea. Negative for abdominal pain, constipation, diarrhea and vomiting.   Musculoskeletal: Negative.    Neurological:  Negative for headaches.   All other systems reviewed and are negative.      PHYSICAL EXAM:  BP (!) 172/95 (BP Location: Right arm, Patient Position: Sitting)   Pulse 64   Temp 98.5 °F (36.9 °C) (Oral)   Ht 5' 3" (1.6 m)   Wt 41.7 kg (91 lb 14.9 oz)   SpO2 98%   BMI 16.29 kg/m²     General Appearance:    Alert, cooperative, no distress, appears stated age   Lungs:     Clear to auscultation bilaterally, respirations unlabored    Heart:    Regular rate and rhythm, S1 and S2 normal   Breast Exam:    S/p right mastectomy, no chest wall mass or nodularity. Left breast without mass, nodule or skin changes. Nipple without inversion. No axillary or supraclavicular " adenopathy.   Abdomen:     Soft, non-tender, bowel sounds active, no masses, no organomegaly   Extremities:   Extremities normal, atraumatic, no cyanosis or edema   Pulses:   2+ and symmetric all extremities   Skin:   Skin color, texture, turgor normal, no rashes or lesions   Lymph nodes:   Cervical, supraclavicular, and axillary nodes normal   Neurologic:   normal strength, sensation and reflexes     throughout           Pertinent Labs & Imaging:  Pathology Results  (Last 30 days)      None            No results found for this or any previous visit (from the past 24 hours).    Assessment & Plan:    1. Ductal carcinoma in situ (DCIS) of breast, unspecified laterality    2. Essential hypertension      Patient with history of DCIS s/p R mastectomy Sept 2021  Currently on observation. Doing well without major issues. Discussed healthy diet   L breast mammogram repeat April 2026  Will plan for 6 mos follow up    Per NCCN Guidelines, breast cancer patients should be followed every 3-12 months for the first five years and then annually thereafter with annual mammography if mastectomy or breast reconstruction was not undertaken. At this time, there is no indication for routine laboratory or imaging in the surveillance for metastatic disease, unless clinical signs or symptoms arise.    Healthy diet, low alcohol intake, and an active lifestyle, with a goal of an ideal BMI (20-25) is also encouraged to reduce the risk of breast cancer occurrences and recurrences, as well as improve side effects to anti-estrogen medications      Route Chart for Scheduling    Med Onc Chart Routing      Follow up with physician 6 months. Dr. Jaja Dc   Follow up with VANDANA    Infusion scheduling note    Injection scheduling note    Labs    Imaging Mammogram   Left MMG 6/01/2026   Pharmacy appointment No pharmacy appointment needed      Other referrals No nutrition appointment needed -  No referral to Oncology Primary Care needed -  No massage  appointment needed    No additional referrals needed                MDM includes  :    - Acute or chronic illness or injury that poses a threat to life or bodily function  - Review of prior external notes from unique source  - Independent review and explanation of 1 results from unique tests  - Extensive discussion of treatment and management      Herlinda Coleman, LUPILLO   06/13/2025

## 2025-06-20 ENCOUNTER — OFFICE VISIT (OUTPATIENT)
Dept: PRIMARY CARE CLINIC | Facility: CLINIC | Age: 70
End: 2025-06-20
Payer: COMMERCIAL

## 2025-06-20 VITALS
OXYGEN SATURATION: 98 % | DIASTOLIC BLOOD PRESSURE: 84 MMHG | HEIGHT: 63 IN | SYSTOLIC BLOOD PRESSURE: 136 MMHG | BODY MASS INDEX: 16.4 KG/M2 | WEIGHT: 92.56 LBS | HEART RATE: 71 BPM

## 2025-06-20 DIAGNOSIS — S01.91XA LACERATION OF HEAD WITHOUT FOREIGN BODY, UNSPECIFIED PART OF HEAD, INITIAL ENCOUNTER: Primary | ICD-10-CM

## 2025-06-20 PROCEDURE — 99999 PR PBB SHADOW E&M-EST. PATIENT-LVL IV: CPT | Mod: PBBFAC,,, | Performed by: NURSE PRACTITIONER

## 2025-06-20 NOTE — LETTER
June 20, 2025      [facility]  [address]  [facility] [address]  Phone: [phone]  Fax: [phone]       Patient: Cathy Reynolds   YOB: 1955  Date of Visit: 06/20/2025    To Whom It May Concern:    Boogie Reynolds  was at [facility] on 06/20/2025. The patient may return to work/school on 06/24/25 with no restrictions. If you have any questions or concerns, or if I can be of further assistance, please do not hesitate to contact me.    Sincerely,

## 2025-06-20 NOTE — LETTER
June 20, 2025      [facility]  [address]  [facility] [address]  Phone: [phone]  Fax: [phone]       Patient: Cathy Reynolds   YOB: 1955  Date of Visit: 06/20/2025    To Whom It May Concern:    Boogie Reynolds  was at [facility] on 06/20/2025. The patient may return to work/school on 06/24/25 with no restrictions. If you have any questions or concerns, or if I can be of further assistance, please do not hesitate to contact me.    Sincerely,    Brittany Wray MA      Patient/Mother

## 2025-06-20 NOTE — PATIENT INSTRUCTIONS
Treatment - right temporal laceration    -rest  -elevate  -ice pack several times a day  -apply neosporin ointment to the site daily  -ibuprofen 400 mg by mouth every 6 hrs as needed for the headache  -if symptoms worsen, please call the office

## 2025-06-20 NOTE — PROGRESS NOTES
[facility] Note    Chief Complaint      Chief Complaint   Patient presents with    Facial Laceration       History of Present Illness      Cathy Reynolds is a 69 y.o. female who presents today for   Chief Complaint   Patient presents with    Facial Laceration         Ms. Reynolds presents to the clinic with complaints of facial/temporal laceration she received while at work yesterday. She reports moderate bleeding at the site. The bleeding has stopped. She also reports a throbbing temporal headache at its worst 5/10. She has tried taking tylenol with little relief.         Review of Systems   Constitutional:  Negative for chills and fever.   HENT:  Negative for tinnitus.    Eyes:  Negative for blurred vision and double vision.        Supraorbital area tender to touch   Neurological:  Positive for headaches. Negative for dizziness.        Family History:  family history includes Breast cancer in her maternal aunt; Diabetes in her sister; Heart attack in her brother; Hypertension in her brother, mother, and sister.   Family history was reviewed with patient.     Medications:  Encounter Medications[1]    Allergies:  Review of patient's allergies indicates:   Allergen Reactions    Ace inhibitors      Other reaction(s): cough    Diltiazem Other (See Comments)     - rash, lip numb, weak    Norvasc [amlodipine]      MUSCLE TWITCHING     Penicillins Hives       Health Maintenance:  Health Maintenance   Topic Date Due    RSV Vaccine (Age 60+ and Pregnant patients) (1 - Risk 60-74 years 1-dose series) Never done    Colorectal Cancer Screening  09/08/2025    Lipid Panel  02/27/2026    Mammogram  05/31/2026    DEXA Scan  10/04/2026    TETANUS VACCINE  07/17/2029    Hepatitis C Screening  Completed    Shingles Vaccine  Completed    Influenza Vaccine  Completed    COVID-19 Vaccine  Completed    Pneumococcal Vaccines (Age 50+)  Completed     Health Maintenance Topics with due status: Not Due       Topic Last  "Completion Date    TETANUS VACCINE 07/17/2019    Lipid Panel 02/27/2021    DEXA Scan 10/04/2024    Mammogram 05/31/2025       Physical Exam      Vital Signs  Pulse: 71  SpO2: 98 %  BP: 136/84  BP Location: Right arm  Patient Position: Sitting  Pain Score:   2  Pain Loc: Head  Height and Weight  Height: 5' 3" (160 cm)  Weight: 42 kg (92 lb 9.5 oz)  BSA (Calculated - sq m): 1.37 sq meters  BMI (Calculated): 16.4  Weight in (lb) to have BMI = 25: 140.8]    Physical Exam  Vitals reviewed.   Constitutional:       General: She is not in acute distress.     Appearance: Normal appearance. She is normal weight.   HENT:      Head: Normocephalic.      Comments: Right temporal laceration  Eyes:      Extraocular Movements: Extraocular movements intact.      Conjunctiva/sclera: Conjunctivae normal.      Pupils: Pupils are equal, round, and reactive to light.        Comments: Tender to the touch   Cardiovascular:      Rate and Rhythm: Normal rate.   Pulmonary:      Effort: Pulmonary effort is normal.   Musculoskeletal:         General: Normal range of motion.      Cervical back: Normal range of motion and neck supple.   Skin:     General: Skin is warm and dry.   Neurological:      General: No focal deficit present.      Mental Status: She is alert and oriented to person, place, and time.   Psychiatric:         Mood and Affect: Mood normal.         Behavior: Behavior normal.         Thought Content: Thought content normal.            Assessment/Plan     Cathy Reynolds is a 69 y.o.female with:    Laceration of head without foreign body, unspecified part of head, initial encounter  Laceration is above the eyebrow.  Swelling noted to the area. No bleeding at this time. Right temporal headache  Recommended rest, ice, antibiotic ointment to the site, ibuprofen as needed for the tenderness and headache  Monitor for worsening symptoms  Work excuse printed       As above, continue current medications and maintain follow up with " specialists.  Return to clinic as needed.    Greater than 50% of visit was spent face to face with patient.  All questions were answered to patient's satisfaction.          Laurie C Ray, NP-C [facility]                     [1]   Outpatient Encounter Medications as of 6/20/2025   Medication Sig Note Dispense Refill    cholecalciferol, vitamin D3, (VITAMIN D3) 50 mcg (2,000 unit) Cap Take 2 capsules by mouth once daily.        clotrimazole-betamethasone 1-0.05% (LOTRISONE) cream Apply topically 2 (two) times daily. 11/8/2023: prn      cyanocobalamin (VITAMIN B-12) 100 MCG tablet Take 100 mcg by mouth daily as needed.       fluticasone propionate (FLONASE) 50 mcg/actuation nasal spray SHAKE LIQUID AND USE 1 SPRAY IN EACH NOSTRIL TWICE DAILY  15.8 g 3    folic acid/multivit-min/lutein (CENTRUM SILVER ORAL) Take by mouth.       gabapentin (NEURONTIN) 100 MG capsule Take 1 capsule (100 mg total) by mouth nightly as needed (muscel tension).  30 capsule 5    olmesartan (BENICAR) 20 MG tablet Take 1 tablet (20 mg total) by mouth once daily.  90 tablet 3     No facility-administered encounter medications on file as of 6/20/2025.

## 2025-07-08 ENCOUNTER — OFFICE VISIT (OUTPATIENT)
Dept: PRIMARY CARE CLINIC | Facility: CLINIC | Age: 70
End: 2025-07-08
Payer: COMMERCIAL

## 2025-07-08 ENCOUNTER — PATIENT MESSAGE (OUTPATIENT)
Dept: PRIMARY CARE CLINIC | Facility: CLINIC | Age: 70
End: 2025-07-08

## 2025-07-08 VITALS
BODY MASS INDEX: 16.37 KG/M2 | OXYGEN SATURATION: 95 % | HEART RATE: 70 BPM | WEIGHT: 92.38 LBS | TEMPERATURE: 98 F | HEIGHT: 63 IN | SYSTOLIC BLOOD PRESSURE: 134 MMHG | DIASTOLIC BLOOD PRESSURE: 82 MMHG

## 2025-07-08 DIAGNOSIS — M54.16 LUMBAR RADICULOPATHY: ICD-10-CM

## 2025-07-08 DIAGNOSIS — R63.6 UNDERWEIGHT: ICD-10-CM

## 2025-07-08 DIAGNOSIS — I10 ESSENTIAL HYPERTENSION: ICD-10-CM

## 2025-07-08 DIAGNOSIS — Z86.018 HISTORY OF ACOUSTIC NEUROMA: ICD-10-CM

## 2025-07-08 DIAGNOSIS — Z00.00 ANNUAL WELLNESS VISIT: Primary | ICD-10-CM

## 2025-07-08 DIAGNOSIS — M81.0 OSTEOPOROSIS, POSTMENOPAUSAL: ICD-10-CM

## 2025-07-08 DIAGNOSIS — Z00.8 ENCOUNTER FOR WORK CAPABILITY ASSESSMENT: ICD-10-CM

## 2025-07-08 DIAGNOSIS — Z86.000 HISTORY OF DUCTAL CARCINOMA IN SITU (DCIS) OF BREAST: ICD-10-CM

## 2025-07-08 PROCEDURE — 1159F MED LIST DOCD IN RCRD: CPT | Mod: CPTII,S$GLB,, | Performed by: NURSE PRACTITIONER

## 2025-07-08 PROCEDURE — 4010F ACE/ARB THERAPY RXD/TAKEN: CPT | Mod: CPTII,S$GLB,, | Performed by: NURSE PRACTITIONER

## 2025-07-08 PROCEDURE — 3288F FALL RISK ASSESSMENT DOCD: CPT | Mod: CPTII,S$GLB,, | Performed by: NURSE PRACTITIONER

## 2025-07-08 PROCEDURE — 3075F SYST BP GE 130 - 139MM HG: CPT | Mod: CPTII,S$GLB,, | Performed by: NURSE PRACTITIONER

## 2025-07-08 PROCEDURE — 1101F PT FALLS ASSESS-DOCD LE1/YR: CPT | Mod: CPTII,S$GLB,, | Performed by: NURSE PRACTITIONER

## 2025-07-08 PROCEDURE — 1160F RVW MEDS BY RX/DR IN RCRD: CPT | Mod: CPTII,S$GLB,, | Performed by: NURSE PRACTITIONER

## 2025-07-08 PROCEDURE — 3079F DIAST BP 80-89 MM HG: CPT | Mod: CPTII,S$GLB,, | Performed by: NURSE PRACTITIONER

## 2025-07-08 PROCEDURE — 1126F AMNT PAIN NOTED NONE PRSNT: CPT | Mod: CPTII,S$GLB,, | Performed by: NURSE PRACTITIONER

## 2025-07-08 PROCEDURE — 99397 PER PM REEVAL EST PAT 65+ YR: CPT | Mod: S$GLB,,, | Performed by: NURSE PRACTITIONER

## 2025-07-08 PROCEDURE — 3008F BODY MASS INDEX DOCD: CPT | Mod: CPTII,S$GLB,, | Performed by: NURSE PRACTITIONER

## 2025-07-08 PROCEDURE — 99999 PR PBB SHADOW E&M-EST. PATIENT-LVL IV: CPT | Mod: PBBFAC,,, | Performed by: NURSE PRACTITIONER

## 2025-07-08 NOTE — PROGRESS NOTES
[facility] Note    Chief Complaint      Chief Complaint   Patient presents with    Annual Exam       History of Present Illness      Cathy Reynolds is a 70 y.o. female who presents today for   Chief Complaint   Patient presents with    Annual Exam         Ms. Reynolds presents to the clinic for her annual wellness assessment. She is also requesting forms for her employment to be filled out.  The patient presents also with complaints of a postnasal drip and cough that has lingered for >1 week.  Otherwise she is feeling well without other complaints. No recent illnesses noted.  Hx of htn, anxiety, hx of breast cancer, osteoporosis and underweight.          Review of Systems   Constitutional:  Negative for chills, fever and malaise/fatigue.   HENT:  Positive for hearing loss. Negative for congestion, sinus pain, sore throat and tinnitus.         Hx of acoustic neuroma   Eyes:  Positive for blurred vision. Negative for double vision.        Cataracts   Respiratory:  Negative for cough, shortness of breath and wheezing.    Cardiovascular:  Negative for chest pain, palpitations and leg swelling.   Gastrointestinal:  Negative for abdominal pain, constipation, diarrhea, heartburn, nausea and vomiting.   Genitourinary:  Negative for dysuria.   Musculoskeletal:  Positive for back pain and neck pain. Negative for joint pain and myalgias.        Hx of sciatica  Hx of cervical degenerative dse   Neurological:  Positive for tingling. Negative for dizziness and headaches.   Psychiatric/Behavioral:  Negative for depression and memory loss. The patient is not nervous/anxious and does not have insomnia.         Family History:  family history includes Breast cancer in her maternal aunt; Diabetes in her sister; Heart attack in her brother; Hypertension in her brother, mother, and sister.   Family history was reviewed with patient.     Medications:  Encounter Medications[1]    Allergies:  Review of patient's  "allergies indicates:   Allergen Reactions    Ace inhibitors      Other reaction(s): cough    Diltiazem Other (See Comments)     - rash, lip numb, weak    Norvasc [amlodipine]      MUSCLE TWITCHING     Penicillins Hives       Health Maintenance:  Health Maintenance   Topic Date Due    RSV Vaccine (Age 60+ and Pregnant patients) (1 - Risk 60-74 years 1-dose series) Never done    Colorectal Cancer Screening  09/08/2025    Influenza Vaccine (1) 09/01/2025    Lipid Panel  02/27/2026    Mammogram  05/31/2026    DEXA Scan  10/04/2026    TETANUS VACCINE  07/17/2029    Hepatitis C Screening  Completed    Shingles Vaccine  Completed    COVID-19 Vaccine  Completed    Pneumococcal Vaccines (Age 50+)  Completed     Health Maintenance Topics with due status: Not Due       Topic Last Completion Date    TETANUS VACCINE 07/17/2019    Lipid Panel 02/27/2021    DEXA Scan 10/04/2024    Influenza Vaccine 10/26/2024    Mammogram 05/31/2025       Physical Exam      Vital Signs  Temp: 97.8 °F (36.6 °C)  Temp Source: Oral  Pulse: 70  SpO2: 95 %  BP: 134/82  BP Location: Right arm  Patient Position: Sitting  Pain Score: 0-No pain  Height and Weight  Height: 5' 3" (160 cm)  Weight: 41.9 kg (92 lb 6 oz)  BSA (Calculated - sq m): 1.36 sq meters  BMI (Calculated): 16.4  Weight in (lb) to have BMI = 25: 140.8]    Physical Exam  Vitals reviewed.   Constitutional:       General: She is not in acute distress.     Appearance: Normal appearance. She is not ill-appearing.      Comments: underweight   HENT:      Head: Normocephalic and atraumatic.      Nose: No congestion or rhinorrhea.      Mouth/Throat:      Pharynx: Posterior oropharyngeal erythema and postnasal drip present. No oropharyngeal exudate.      Tonsils: No tonsillar exudate.   Eyes:      Extraocular Movements: Extraocular movements intact.      Conjunctiva/sclera: Conjunctivae normal.      Pupils: Pupils are equal, round, and reactive to light.   Cardiovascular:      Rate and Rhythm: " Normal rate and regular rhythm.      Pulses: Normal pulses.      Heart sounds: Normal heart sounds. No murmur heard.  Pulmonary:      Effort: Pulmonary effort is normal. No respiratory distress.      Breath sounds: Normal breath sounds. No wheezing.   Abdominal:      General: Abdomen is flat. Bowel sounds are normal. There is no distension.      Palpations: Abdomen is soft.      Tenderness: There is no abdominal tenderness.   Musculoskeletal:         General: Normal range of motion.      Cervical back: Normal range of motion and neck supple. No rigidity.   Lymphadenopathy:      Cervical: No cervical adenopathy.   Skin:     General: Skin is warm and dry.      Capillary Refill: Capillary refill takes less than 2 seconds.      Findings: No rash.   Neurological:      General: No focal deficit present.      Mental Status: She is alert. She is disoriented.   Psychiatric:         Mood and Affect: Mood normal.         Behavior: Behavior normal.         Thought Content: Thought content normal.            Assessment/Plan     Cathy Reynolds is a 70 y.o.female with:    Annual wellness visit  -     CBC Auto Differential; Future; Expected date: 07/08/2025  -     Comprehensive Metabolic Panel; Future; Expected date: 07/08/2025  -     Hemoglobin A1C; Future; Expected date: 07/08/2025  -     Lipid Panel; Future; Expected date: 07/08/2025  -     T4, Free; Future; Expected date: 07/08/2025  -     TSH; Future; Expected date: 07/08/2025    Postnasal drip with cough  Advised to continue allegra for allergies and to use flonase daily (always prior to flonase, use saline nasal spray to clean out)    Osteopenia  10/2024 dexa bone scan showing osteopenia.  Continue to monitor.  Discussed prevention and engaging in weight-bearing exercises along with diet.  Continues taking vitamin D3 and vitamin B12    Lumbar radiculopathy  Stable today. Complains of mild sciatica but doesn't take the gabapentin worried about side effects she read  about.    History of acoustic neuroma  treated with Gamma Knife Radiosurgery on 1/25/2019. Stable today     Essential hypertension  Improved nicely with increase in olmesartan.  no side effects at this time    History of ductal carcinoma in situ (DCIS) of breast  Patient with history of DCIS s/p R mastectomy Sept 2021  L breast mammogram normal 5/2025     Encounter for work capability assessment  Form filled out by myself and given back to the patient       UNDERWEIGHT BMI 16.56:  F/u B12 as above   As above, continue current medications and maintain follow up with specialists.  Return to clinic as needed.    Greater than 50% of visit was spent face to face with patient.  All questions were answered to patient's satisfaction.          Laurie C Ray, NP-C [facility]                     [1]   Outpatient Encounter Medications as of 7/8/2025   Medication Sig Note Dispense Refill    cholecalciferol, vitamin D3, (VITAMIN D3) 50 mcg (2,000 unit) Cap Take 2 capsules by mouth once daily.        clotrimazole-betamethasone 1-0.05% (LOTRISONE) cream Apply topically 2 (two) times daily. 11/8/2023: prn      cyanocobalamin (VITAMIN B-12) 100 MCG tablet Take 100 mcg by mouth daily as needed.       fluticasone propionate (FLONASE) 50 mcg/actuation nasal spray SHAKE LIQUID AND USE 1 SPRAY IN EACH NOSTRIL TWICE DAILY  15.8 g 3    folic acid/multivit-min/lutein (CENTRUM SILVER ORAL) Take by mouth.       gabapentin (NEURONTIN) 100 MG capsule Take 1 capsule (100 mg total) by mouth nightly as needed (muscel tension).  30 capsule 5    olmesartan (BENICAR) 20 MG tablet Take 1 tablet (20 mg total) by mouth once daily.  90 tablet 3     No facility-administered encounter medications on file as of 7/8/2025.

## 2025-07-09 ENCOUNTER — LAB VISIT (OUTPATIENT)
Dept: LAB | Facility: HOSPITAL | Age: 70
End: 2025-07-09
Payer: COMMERCIAL

## 2025-07-09 DIAGNOSIS — Z00.00 ANNUAL WELLNESS VISIT: ICD-10-CM

## 2025-07-09 LAB
ABSOLUTE EOSINOPHIL (OHS): 0.02 K/UL
ABSOLUTE MONOCYTE (OHS): 0.57 K/UL (ref 0.3–1)
ABSOLUTE NEUTROPHIL COUNT (OHS): 3.47 K/UL (ref 1.8–7.7)
ALBUMIN SERPL BCP-MCNC: 4.3 G/DL (ref 3.5–5.2)
ALP SERPL-CCNC: 58 UNIT/L (ref 40–150)
ALT SERPL W/O P-5'-P-CCNC: 19 UNIT/L (ref 10–44)
ANION GAP (OHS): 7 MMOL/L (ref 8–16)
AST SERPL-CCNC: 23 UNIT/L (ref 11–45)
BASOPHILS # BLD AUTO: 0.03 K/UL
BASOPHILS NFR BLD AUTO: 0.4 %
BILIRUB SERPL-MCNC: 0.4 MG/DL (ref 0.1–1)
BUN SERPL-MCNC: 19 MG/DL (ref 8–23)
CALCIUM SERPL-MCNC: 10 MG/DL (ref 8.7–10.5)
CHLORIDE SERPL-SCNC: 102 MMOL/L (ref 95–110)
CHOLEST SERPL-MCNC: 233 MG/DL (ref 120–199)
CHOLEST/HDLC SERPL: 2.6 {RATIO} (ref 2–5)
CO2 SERPL-SCNC: 30 MMOL/L (ref 23–29)
CREAT SERPL-MCNC: 0.7 MG/DL (ref 0.5–1.4)
EAG (OHS): 111 MG/DL (ref 68–131)
ERYTHROCYTE [DISTWIDTH] IN BLOOD BY AUTOMATED COUNT: 13.3 % (ref 11.5–14.5)
GFR SERPLBLD CREATININE-BSD FMLA CKD-EPI: >60 ML/MIN/1.73/M2
GLUCOSE SERPL-MCNC: 85 MG/DL (ref 70–110)
HBA1C MFR BLD: 5.5 % (ref 4–5.6)
HCT VFR BLD AUTO: 43.1 % (ref 37–48.5)
HDLC SERPL-MCNC: 90 MG/DL (ref 40–75)
HDLC SERPL: 38.6 % (ref 20–50)
HGB BLD-MCNC: 12.7 GM/DL (ref 12–16)
IMM GRANULOCYTES # BLD AUTO: 0.02 K/UL (ref 0–0.04)
IMM GRANULOCYTES NFR BLD AUTO: 0.3 % (ref 0–0.5)
LDLC SERPL CALC-MCNC: 131.4 MG/DL (ref 63–159)
LYMPHOCYTES # BLD AUTO: 2.89 K/UL (ref 1–4.8)
MCH RBC QN AUTO: 25.9 PG (ref 27–31)
MCHC RBC AUTO-ENTMCNC: 29.5 G/DL (ref 32–36)
MCV RBC AUTO: 88 FL (ref 82–98)
NONHDLC SERPL-MCNC: 143 MG/DL
NUCLEATED RBC (/100WBC) (OHS): 0 /100 WBC
PLATELET # BLD AUTO: 235 K/UL (ref 150–450)
PMV BLD AUTO: 12.1 FL (ref 9.2–12.9)
POTASSIUM SERPL-SCNC: 4.7 MMOL/L (ref 3.5–5.1)
PROT SERPL-MCNC: 7.4 GM/DL (ref 6–8.4)
RBC # BLD AUTO: 4.91 M/UL (ref 4–5.4)
RELATIVE EOSINOPHIL (OHS): 0.3 %
RELATIVE LYMPHOCYTE (OHS): 41.3 % (ref 18–48)
RELATIVE MONOCYTE (OHS): 8.1 % (ref 4–15)
RELATIVE NEUTROPHIL (OHS): 49.6 % (ref 38–73)
SODIUM SERPL-SCNC: 139 MMOL/L (ref 136–145)
T4 FREE SERPL-MCNC: 1.08 NG/DL (ref 0.71–1.51)
TRIGL SERPL-MCNC: 58 MG/DL (ref 30–150)
TSH SERPL-ACNC: 0.35 UIU/ML (ref 0.4–4)
WBC # BLD AUTO: 7 K/UL (ref 3.9–12.7)

## 2025-07-09 PROCEDURE — 80053 COMPREHEN METABOLIC PANEL: CPT

## 2025-07-09 PROCEDURE — 85025 COMPLETE CBC W/AUTO DIFF WBC: CPT

## 2025-07-09 PROCEDURE — 80061 LIPID PANEL: CPT

## 2025-07-09 PROCEDURE — 84439 ASSAY OF FREE THYROXINE: CPT

## 2025-07-09 PROCEDURE — 84443 ASSAY THYROID STIM HORMONE: CPT

## 2025-07-09 PROCEDURE — 83036 HEMOGLOBIN GLYCOSYLATED A1C: CPT

## 2025-07-09 PROCEDURE — 36415 COLL VENOUS BLD VENIPUNCTURE: CPT | Mod: PN

## 2025-07-15 DIAGNOSIS — R89.9 ABNORMAL LABORATORY TEST RESULT: Primary | ICD-10-CM

## 2025-07-21 ENCOUNTER — TELEPHONE (OUTPATIENT)
Dept: PRIMARY CARE CLINIC | Facility: CLINIC | Age: 70
End: 2025-07-21
Payer: COMMERCIAL

## 2025-07-21 NOTE — TELEPHONE ENCOUNTER
Vm left informing that Rsv Vaccine is covered at [facility]. A virtual or e-visit appt is needed to discuss Hep B vaccine need.

## 2025-07-21 NOTE — TELEPHONE ENCOUNTER
Copied from CRM #[id number]. Topic: General Inquiry - Patient Advice  >> Jul 18, 2025  4:36 PM Kaylynn wrote:  .1MEDICALADVICE     Patient is calling for Medical Advice regarding: Pt is calling in regards to asking if she needs to take the hep B vaccine and the RSV? Pt wants to schedule an appointment with the pharmacist. Pt is asking what shots does she need. Please advise. Thank you    How long has patient had these symptoms:    Pharmacy name and phone#: N/A    Patient wants a call back or thru [facility], provide patient's call back phone number:  Patient    Comments:    Please advise patient replies from provider may take up to 48 hours.

## 2025-08-06 ENCOUNTER — OFFICE VISIT (OUTPATIENT)
Dept: PRIMARY CARE CLINIC | Facility: CLINIC | Age: 70
End: 2025-08-06
Payer: COMMERCIAL

## 2025-08-06 VITALS
SYSTOLIC BLOOD PRESSURE: 160 MMHG | WEIGHT: 95.44 LBS | HEIGHT: 63 IN | OXYGEN SATURATION: 98 % | DIASTOLIC BLOOD PRESSURE: 82 MMHG | HEART RATE: 90 BPM | BODY MASS INDEX: 16.91 KG/M2

## 2025-08-06 DIAGNOSIS — R20.0 LIP NUMBNESS: ICD-10-CM

## 2025-08-06 DIAGNOSIS — R05.3 CHRONIC COUGH: ICD-10-CM

## 2025-08-06 DIAGNOSIS — G62.9 POLYNEUROPATHY: ICD-10-CM

## 2025-08-06 DIAGNOSIS — L29.9 PRURITUS: Primary | ICD-10-CM

## 2025-08-06 PROCEDURE — 99999 PR PBB SHADOW E&M-EST. PATIENT-LVL V: CPT | Mod: PBBFAC,,, | Performed by: NURSE PRACTITIONER

## 2025-08-06 RX ORDER — FEXOFENADINE HCL 180 MG/1
180 TABLET ORAL DAILY
Qty: 7 TABLET | Refills: 0 | Status: SHIPPED | OUTPATIENT
Start: 2025-08-06 | End: 2026-08-06

## 2025-08-06 RX ORDER — AZELASTINE 1 MG/ML
1 SPRAY, METERED NASAL 2 TIMES DAILY
Qty: 30 ML | Refills: 1 | Status: SHIPPED | OUTPATIENT
Start: 2025-08-06 | End: 2026-08-06

## 2025-08-06 RX ORDER — TRIAMCINOLONE ACETONIDE 40 MG/ML
40 INJECTION, SUSPENSION INTRA-ARTICULAR; INTRAMUSCULAR
Status: CANCELLED | OUTPATIENT
Start: 2025-08-06 | End: 2025-08-06

## 2025-08-06 NOTE — PATIENT INSTRUCTIONS
For postnasal drip and cough  Hydrate  Neti pot or saline nasal spray several times a day, then use the azelastine nasal spray  Continue allegra daily    For the tingling and numbness in your fingers and sciatica, referral to neurology  For the sciatica, start the gabapentin and yoga exercises

## 2025-08-06 NOTE — PROGRESS NOTES
[facility] Note    Chief Complaint      Chief Complaint   Patient presents with    Numbness    Nasal Congestion    Cough       History of Present Illness      Cathy Reynolds is a 70 y.o. female who presents today for   Chief Complaint   Patient presents with    Numbness    Nasal Congestion    Cough         Ms. Reynolds presents to the clinic today with continued complaints of cough and post nasal drip. She's had the nonproductive cough now for a few weeks. She is not taking the allergy medications as prescribed during our last visit.  The patient also complains of lip tingling and itching that started in July but is improving. She believes the tingling started after she received the RSV vaccine in July.  Denies difficulty swallowing or shortness of breath. Her history includes anxiety, allergic rhinitis, htn, breast cancer, h/o brain tumor, hx of acoustic neuroma, osteoporosis, diverticulosis.         Review of Systems   Constitutional:  Negative for chills, fever and malaise/fatigue.   Respiratory:  Positive for cough. Negative for sputum production, shortness of breath and wheezing.         Drip in throat/post nasal drip   Cardiovascular:  Negative for chest pain.   Musculoskeletal:  Negative for falls and myalgias.        Tingling fingers/pins and needles   Skin:  Negative for rash.        Lip itchy and tingling sensation   Neurological:  Negative for dizziness and headaches.        Family History:  family history includes Breast cancer in her maternal aunt; Diabetes in her sister; Heart attack in her brother; Hypertension in her brother, mother, and sister.   Family history was reviewed with patient.     Medications:  Encounter Medications[1]    Allergies:  Review of patient's allergies indicates:   Allergen Reactions    Ace inhibitors      Other reaction(s): cough    Diltiazem Other (See Comments)     - rash, lip numb, weak    Norvasc [amlodipine]      MUSCLE TWITCHING     Penicillins Hives  "      Health Maintenance:  Health Maintenance   Topic Date Due    Colorectal Cancer Screening  09/08/2025    Influenza Vaccine (1) 09/01/2025    Mammogram  05/31/2026    DEXA Scan  10/04/2026    TETANUS VACCINE  07/17/2029    Lipid Panel  07/09/2030    Hepatitis C Screening  Completed    Shingles Vaccine  Completed    COVID-19 Vaccine  Completed    RSV Vaccine (Age 60+ and Pregnant patients)  Completed    Pneumococcal Vaccines (Age 50+)  Completed     Health Maintenance Topics with due status: Not Due       Topic Last Completion Date    TETANUS VACCINE 07/17/2019    DEXA Scan 10/04/2024    Influenza Vaccine 10/26/2024    Mammogram 05/31/2025    Lipid Panel 07/09/2025       Physical Exam      Vital Signs  Pulse: 90  SpO2: 98 %  BP: (!) 160/82  BP Location: Right arm  Patient Position: Sitting  Pain Score:   5  Pain Loc: Generalized  Height and Weight  Height: 5' 3" (160 cm)  Weight: 43.3 kg (95 lb 7.4 oz)  BSA (Calculated - sq m): 1.39 sq meters  BMI (Calculated): 16.9  Weight in (lb) to have BMI = 25: 140.8]    Physical Exam  Vitals reviewed.   Constitutional:       General: She is not in acute distress.     Appearance: Normal appearance. She is normal weight. She is not ill-appearing.   HENT:      Head: Normocephalic and atraumatic.      Nose: Congestion present. No rhinorrhea.      Mouth/Throat:      Pharynx: Oropharynx is clear. No oropharyngeal exudate or posterior oropharyngeal erythema.   Eyes:      Extraocular Movements: Extraocular movements intact.      Conjunctiva/sclera: Conjunctivae normal.      Pupils: Pupils are equal, round, and reactive to light.   Cardiovascular:      Rate and Rhythm: Normal rate and regular rhythm.   Pulmonary:      Effort: Pulmonary effort is normal.   Musculoskeletal:      Cervical back: Normal range of motion and neck supple.   Skin:     General: Skin is warm and dry.   Neurological:      General: No focal deficit present.      Mental Status: She is alert and oriented to person, " place, and time.   Psychiatric:         Mood and Affect: Mood normal.         Behavior: Behavior normal.         Thought Content: Thought content normal.            Assessment/Plan     Cathy Reynolds is a 70 y.o.female with:    Pruritus  -     fexofenadine (ALLEGRA ALLERGY) 180 MG tablet; Take 1 tablet (180 mg total) by mouth once daily.  Dispense: 7 tablet; Refill: 0    Lip numbness  Monitor.  Pt refused referral to neurology  Chronic cough  Postnasal drip  Monitor.  Pt refused ENT referral for chronic cough.  The pt is not taking the medications that were prescribed as directed.  Reinforced the importance of medication compliance.  -     azelastine (ASTELIN) 137 mcg (0.1 %) nasal spray; 1 spray (137 mcg total) by Nasal route 2 (two) times daily.  Dispense: 30 mL; Refill: 1    Polyneuropathy, unspecified  Fingers are affected.  The patient was prescribed gabapentin last visit but she's not taken it.  She also refused today to be referred to neurology.  Recommended trying the gabapentin before bedtime at low dose.   Recommened PT as well but the patient refused        As above, continue current medications and maintain follow up with specialists.  Return to clinic as needed.    Greater than 50% of visit was spent face to face with patient.  All questions were answered to patient's satisfaction.          Laurie C Ray, NP-C [facility]                     [1]   Outpatient Encounter Medications as of 8/6/2025   Medication Sig Note Dispense Refill    cholecalciferol, vitamin D3, (VITAMIN D3) 50 mcg (2,000 unit) Cap Take 2 capsules by mouth once daily.        clotrimazole-betamethasone 1-0.05% (LOTRISONE) cream Apply topically 2 (two) times daily. 11/8/2023: prn      cyanocobalamin (VITAMIN B-12) 100 MCG tablet Take 100 mcg by mouth daily as needed.       folic acid/multivit-min/lutein (CENTRUM SILVER ORAL) Take by mouth.       gabapentin (NEURONTIN) 100 MG capsule Take 1 capsule (100 mg total) by mouth  nightly as needed (muscel tension).  30 capsule 5    olmesartan (BENICAR) 20 MG tablet Take 1 tablet (20 mg total) by mouth once daily.  90 tablet 3    RSVPreF3 antigen-AS01E, PF, (AREXVY, PF,) 120 mcg/0.5 mL SusR vaccine Inject into the muscle.  1 each 0    [DISCONTINUED] fluticasone propionate (FLONASE) 50 mcg/actuation nasal spray SHAKE LIQUID AND USE 1 SPRAY IN EACH NOSTRIL TWICE DAILY  15.8 g 3    azelastine (ASTELIN) 137 mcg (0.1 %) nasal spray 1 spray (137 mcg total) by Nasal route 2 (two) times daily.  30 mL 1    fexofenadine (ALLEGRA ALLERGY) 180 MG tablet Take 1 tablet (180 mg total) by mouth once daily.  7 tablet 0     No facility-administered encounter medications on file as of 8/6/2025.

## (undated) DEVICE — GAUZE FLUFF XXLG 36X36 2 PLY

## (undated) DEVICE — SUT VICRYL 3-0 27 SH

## (undated) DEVICE — SUT CTD VICRYL 3-0 CR/SH

## (undated) DEVICE — DRAPE STERI INSTRUMENT 1018

## (undated) DEVICE — SEE MEDLINE ITEM 152622

## (undated) DEVICE — DRESSING XEROFORM FOIL PK 1X8

## (undated) DEVICE — COVERS PROBE NR-48 STERILE

## (undated) DEVICE — Device

## (undated) DEVICE — PACK UNIVERSAL SPLIT II

## (undated) DEVICE — SUT SILK 3-0 SH 18IN BLACK

## (undated) DEVICE — SEE MEDLINE ITEM 146417

## (undated) DEVICE — SUT CTD VICRYL 4-0 BR PS-2

## (undated) DEVICE — ELECTRODE REM PLYHSV RETURN 9

## (undated) DEVICE — NDL 18GA X1 1/2 REG BEVEL

## (undated) DEVICE — NEOGUARD COVER 4X30CM STERILE

## (undated) DEVICE — SPONGE LAP 18X18 PREWASHED

## (undated) DEVICE — SUT VICRYL PLUS 4-0 P3 18IN

## (undated) DEVICE — DRAPE THYROID WITH ARMBOARD

## (undated) DEVICE — SEE MEDLINE ITEM 157128

## (undated) DEVICE — ELECTRODE BLADE INSULATED 1 IN

## (undated) DEVICE — SUT ETHILON 2-0 PSLX 30IN

## (undated) DEVICE — ADHESIVE DERMABOND ADVANCED

## (undated) DEVICE — CUP MEDICINE STERILE 2OZ

## (undated) DEVICE — TRAY MINOR GEN SURG